# Patient Record
Sex: FEMALE | Race: WHITE | Employment: PART TIME | URBAN - METROPOLITAN AREA
[De-identification: names, ages, dates, MRNs, and addresses within clinical notes are randomized per-mention and may not be internally consistent; named-entity substitution may affect disease eponyms.]

---

## 2012-06-05 LAB — COLONOSCOPY, EXTERNAL: NORMAL

## 2017-01-06 ENCOUNTER — OFFICE VISIT (OUTPATIENT)
Dept: FAMILY MEDICINE CLINIC | Age: 72
End: 2017-01-06

## 2017-01-06 VITALS
OXYGEN SATURATION: 98 % | HEART RATE: 88 BPM | BODY MASS INDEX: 32.57 KG/M2 | HEIGHT: 62 IN | WEIGHT: 177 LBS | SYSTOLIC BLOOD PRESSURE: 122 MMHG | DIASTOLIC BLOOD PRESSURE: 79 MMHG | TEMPERATURE: 98.6 F | RESPIRATION RATE: 14 BRPM

## 2017-01-06 DIAGNOSIS — E11.65 TYPE 2 DIABETES MELLITUS WITH HYPERGLYCEMIA, WITHOUT LONG-TERM CURRENT USE OF INSULIN (HCC): Primary | ICD-10-CM

## 2017-01-06 DIAGNOSIS — I10 ESSENTIAL HYPERTENSION: ICD-10-CM

## 2017-01-06 DIAGNOSIS — E78.5 HYPERLIPIDEMIA LDL GOAL <100: ICD-10-CM

## 2017-01-06 NOTE — PROGRESS NOTES
Subjective:     Josefina Crockett is a 73 y.o. female seen for follow up of diabetes and multiple other chronic conditions. She has diabetes, hypertension and hyperlipidemia. Diabetic Review of Systems - medication compliance: compliant all of the time, diabetic diet compliance: compliant all of the time, home glucose monitoring: AM fasting values range on average: 100-110    further diabetic ROS: no polyuria or polydipsia, no chest pain, dyspnea or TIA's, no numbness, tingling or pain in extremities, no unusual visual symptoms, no hypoglycemia, no medication side effects noted, weight has decreased, last eye exam UTD.     Wt Readings from Last 3 Encounters:   01/06/17 177 lb (80.3 kg)   07/07/16 175 lb (79.4 kg)   04/07/16 173 lb 3.2 oz (78.6 kg)     Diabetic monitoring:     Lab Results   Component Value Date/Time    Hemoglobin A1c 6.4 07/08/2016 09:55 AM    Hemoglobin A1c 6.8 05/04/2015 11:24 AM    Hemoglobin A1c 7.0 04/30/2015 08:47 AM    Glucose 99 09/03/2015 10:40 AM    Microalb/Creat ratio (ug/mg creat.) 26.8 07/08/2016 09:55 AM    LDL, calculated 100 04/07/2016 08:51 AM    Creatinine 0.94 09/03/2015 10:40 AM       Last A1C: 6.4% on 7/2016, due soon for repeat  Serum Cr:  UTD  Urine microalbumin: UTD  Lipid panel: LDL was 100 on 4/7/16; HDL was 62; repeat due in 4/2017  Diabetic Eye exam: UTD, we will request copy of this today  Foot exam: UTD    Pneumonia vaccine: UTD  Annual flu vaccine for this season: got at school, high dose    On Ace-i or ARB: yes   Medication Sig    telmisartan (MICARDIS) 80 mg tablet TAKE ONE TABLET BY MOUTH DAILY    chlorthalidone (HYGROTEN) 25 mg tablet Take 1 tablet by mouth  daily     Telmistartan is not covered and patient has been on losartan but BP is not well controlled on this one, gained weight on this medication, would like to continue on telmistartan which we were able to get pre authorization for recently    On statin: yes  Current Outpatient Prescriptions   Medication Sig Dispense Refill    simvastatin (ZOCOR) 80 mg tablet 1 PO daily        On metformin:  yes  Medication Sig    metFORMIN (GLUCOPHAGE) 500 mg tablet Take 1 Tab by mouth two (2) times daily (with meals). Other diabetic medications: none  Following diabetic diet: yes  Attended diabetic education: yes  Attended monthly diabetic class here in clinic: yes    Patient Active Problem List   Diagnosis Code    HTN (hypertension) I10    Osteopenia M85.80    Genital herpes A60.00    Hyperlipemia E78.5    Diabetes mellitus ([facility] E11.9    Diabetes mellitus, controlled ([facility] E11.9     Patient Active Problem List    Diagnosis Date Noted    Diabetes mellitus, controlled ([facility] 09/03/2015    Diabetes mellitus ([facility] 05/07/2015    Hyperlipemia 11/26/2014    Osteopenia 03/10/2010    Genital herpes 03/10/2010    HTN (hypertension) 03/08/2010     Current Outpatient Prescriptions   Medication Sig Dispense Refill    atorvastatin (LIPITOR) 40 mg tablet Take 1 tablet by mouth  daily 90 Tab 3    telmisartan (MICARDIS) 80 mg tablet TAKE ONE TABLET BY MOUTH DAILY 90 Tab 3    chlorthalidone (HYGROTEN) 25 mg tablet Take 1 tablet by mouth  daily 90 Tab 3    metFORMIN (GLUCOPHAGE) 500 mg tablet Take 1 tablet by mouth two  times daily with meals 180 Tab 3    raloxifene (EVISTA) 60 mg tablet Take 1 tablet by mouth  daily 90 Tab 3    valACYclovir (VALTREX) 500 mg tablet Take 1 tablet by mouth  daily 90 Tab 3    losartan (COZAAR) 100 mg tablet       GLUCOSAMINE HCL-MSM PO Take  by mouth.  glucose blood VI test strips (ACCU-CHEK SMARTVIEW TEST STRIP) strip Per insurance, testing 2 x weekly Dx: Diabetes uncontrolled 250.02 1 Each 11    Lancets misc Per insurance, testing 2 x weekly Dx: Diabetes uncontrolled 250.02 1 Each 11    multivitamin (ONE A DAY) tablet Take 1 Tab by mouth daily.  OMEPRAZOLE MAGNESIUM (PRILOSEC OTC PO) Take  by mouth. Takes one po daily.        naproxen sodium (ALEVE) 220 mg tablet Take 220 mg by mouth daily as needed. prn       Allergies   Allergen Reactions    Pcn [Penicillins] Itching     Lips itching    Femara [Letrozole] Other (comments)     htn        Lab Results   Component Value Date/Time    Hemoglobin A1c 6.4 07/08/2016 09:55 AM    Hemoglobin A1c 6.8 05/04/2015 11:24 AM    Hemoglobin A1c 7.0 04/30/2015 08:47 AM    Glucose 99 09/03/2015 10:40 AM    Microalb/Creat ratio (ug/mg creat.) 26.8 07/08/2016 09:55 AM    LDL, calculated 100 04/07/2016 08:51 AM    Creatinine 0.94 09/03/2015 10:40 AM      Lab Results   Component Value Date/Time    Cholesterol, total 189 04/07/2016 08:51 AM    HDL Cholesterol 65 04/07/2016 08:51 AM    LDL, calculated 100 04/07/2016 08:51 AM    Triglyceride 122 04/07/2016 08:51 AM     Lab Results   Component Value Date/Time    ALT 6 04/30/2015 08:47 AM    AST 19 04/30/2015 08:47 AM    Alk. phosphatase 95 04/30/2015 08:47 AM    Bilirubin, direct 0.12 04/30/2015 08:47 AM    Bilirubin, total 0.4 04/30/2015 08:47 AM     Lab Results   Component Value Date/Time    GFR est AA 73 09/03/2015 10:40 AM    GFR est non-AA 63 09/03/2015 10:40 AM    Creatinine 0.94 09/03/2015 10:40 AM    BUN 20 09/03/2015 10:40 AM    Sodium 138 09/03/2015 10:40 AM    Potassium 4.5 09/03/2015 10:40 AM    Chloride 97 09/03/2015 10:40 AM    CO2 24 09/03/2015 10:40 AM         Review of Systems  Pertinent items are noted in HPI. Objective:     Visit Vitals    Ht 5' 2\" (1.575 m)    Wt 177 lb (80.3 kg)    BMI 32.37 kg/m2     Appearance: alert, well appearing, and in no distress. CV: S1, S2 present, no M/R/G  Pulm:non labored on RA    Lab review: labs reviewed, I note that lipids LDL result does not yet meet goal but not yet due for recheck    Assessment/Plan:       ICD-10-CM ICD-9-CM    1.  Type 2 diabetes mellitus with hyperglycemia, without long-term current use of insulin (HCC) E11.65 250.00 HEMOGLOBIN A1C WITH EAG    Condition stable, after review of medications, Continue current medication and dosing without change.       790.29    2. Essential hypertension I10 401.9 Condition stable, after review of medications, Continue current medication and dosing without change. 3. Hyperlipidemia LDL goal <100 E78.5 272.4 Condition stable, after review of medications, Continue current medication and dosing without change. FLP due at next visit in 4/2017         Follow-up Disposition:  Return in about 6 months (around 7/6/2017) for Annual wellness visit and DM, HTN. Dr. Greg Coto D.O.   Physician

## 2017-01-06 NOTE — MR AVS SNAPSHOT
Visit Information Date & Time Provider Department Dept. Phone Encounter #  
 1/6/2017  8:00 AM Edward Cash, [address] [facility] [phone] [id number] Follow-up Instructions Return in about 6 months (around 7/6/2017) for Annual wellness visit and DM, HTN. Follow-up and Disposition History Upcoming Health Maintenance Date Due  
 MEDICARE YEARLY EXAM 4/30/2016 EYE EXAM RETINAL OR DILATED Q1 10/19/2016 HEMOGLOBIN A1C Q6M 1/8/2017 LIPID PANEL Q1 4/7/2017 FOOT EXAM Q1 7/7/2017 MICROALBUMIN Q1 7/8/2017 GLAUCOMA SCREENING Q2Y 10/19/2017 BREAST CANCER SCRN MAMMOGRAM 1/8/2018 COLONOSCOPY 6/5/2022 DTaP/Tdap/Td series (2 - Td) 4/1/2024 Allergies as of 1/6/2017  Review Complete On: 1/6/2017 By: Edwadr Cash, DO Severity Noted Reaction Type Reactions Pcn [Penicillins] High 03/03/2010    Itching Lips itching Femara [Letrozole]  03/03/2010    Other (comments)  
 htn Current Immunizations  Reviewed on 9/18/2014 Name Date Influenza High Dose Vaccine PF 10/5/2016, 9/28/2015, 9/18/2014 Influenza Vaccine 8/25/2013 Influenza Vaccine Whole 9/1/2011 Pneumococcal Polysaccharide (PPSV-23) 9/18/2014 Pneumococcal Vaccine (Unspecified Type) 12/14/2011 Tdap 4/1/2014 Zoster 11/2/2012 Not reviewed this visit You Were Diagnosed With   
  
 Codes Comments Type 2 diabetes mellitus with hyperglycemia, without long-term current use of insulin (HCC)    -  Primary ICD-10-CM: E11.65 ICD-9-CM: 250.00, 790.29 Essential hypertension     ICD-10-CM: I10 
ICD-9-CM: 401.9 Hyperlipidemia LDL goal <100     ICD-10-CM: E78.5 ICD-9-CM: 272.4 Vitals BP Pulse Temp Resp Height(growth percentile) Weight(growth percentile) 122/79 (BP 1 Location: Left arm, BP Patient Position: Sitting) 88 98.6 °F (37 °C) (Oral) 14 5' 2\" (1.575 m) 177 lb (80.3 kg) SpO2 BMI OB Status Smoking Status 98% 32.37 kg/m2 Postmenopausal Never Smoker Vitals History BMI and BSA Data Body Mass Index Body Surface Area  
 32.37 kg/m 2 1.87 m 2 Preferred Pharmacy Pharmacy Name Phone [address] [facility], [address] Jeremias Coe Your Updated Medication List  
  
   
This list is accurate as of: 1/6/17  8:26 AM.  Always use your most recent med list.  
  
  
  
  
 ALEVE 220 mg tablet Generic drug:  naproxen sodium Take 220 mg by mouth daily as needed. prn  
  
 atorvastatin 40 mg tablet Commonly known as:  LIPITOR Take 1 tablet by mouth  daily  
  
 chlorthalidone 25 mg tablet Commonly known as:  Beckey Severance Take 1 tablet by mouth  daily GLUCOSAMINE HCL-MSM PO Take  by mouth. glucose blood VI test strips strip Commonly known as:  [address] Per insurance, testing 2 x weekly Dx: Diabetes uncontrolled 250.02 Lancets Misc Per insurance, testing 2 x weekly Dx: Diabetes uncontrolled 250.02  
  
 metFORMIN 500 mg tablet Commonly known as:  GLUCOPHAGE Take 1 tablet by mouth two  times daily with meals  
  
 multivitamin tablet Commonly known as:  ONE A DAY Take 1 Tab by mouth daily. PRILOSEC OTC PO Take  by mouth. Takes one po daily. raloxifene 60 mg tablet Commonly known as:  EVISTA Take 1 tablet by mouth  daily  
  
 telmisartan 80 mg tablet Commonly known as:  MICARDIS  
TAKE ONE TABLET BY MOUTH DAILY  
  
 valACYclovir 500 mg tablet Commonly known as:  VALTREX Take 1 tablet by mouth  daily We Performed the Following HEMOGLOBIN A1C WITH EAG [27670 CPT(R)] Follow-up Instructions Return in about 6 months (around 7/6/2017) for Annual wellness visit and DM, HTN. Patient Instructions Learning About Diabetes Food Guidelines Your Care Instructions Meal planning is important to manage diabetes.  It helps keep your blood sugar at a target level (which you set with your doctor). You don't have to eat special foods. You can eat what your family eats, including sweets once in a while. But you do have to pay attention to how often you eat and how much you eat of certain foods. You may want to work with a dietitian or a certified diabetes educator (CDE) to help you plan meals and snacks. A dietitian or CDE can also help you lose weight if that is one of your goals. What should you know about eating carbs? Managing the amount of carbohydrate (carbs) you eat is an important part of healthy meals when you have diabetes. Carbohydrate is found in many foods. · Learn which foods have carbs. And learn the amounts of carbs in different foods. ¨ Bread, cereal, pasta, and rice have about 15 grams of carbs in a serving. A serving is 1 slice of bread (1 ounce), ½ cup of cooked cereal, or 1/3 cup of cooked pasta or rice. ¨ Fruits have 15 grams of carbs in a serving. A serving is 1 small fresh fruit, such as an apple or orange; ½ of a banana; ½ cup of cooked or canned fruit; ½ cup of fruit juice; 1 cup of melon or raspberries; or 2 tablespoons of dried fruit. ¨ Milk and no-sugar-added yogurt have 15 grams of carbs in a serving. A serving is 1 cup of milk or 2/3 cup of no-sugar-added yogurt. ¨ Starchy vegetables have 15 grams of carbs in a serving. A serving is ½ cup of mashed potatoes or sweet potato; 1 cup winter squash; ½ of a small baked potato; ½ cup of cooked beans; or ½ cup cooked corn or green peas. · Learn how much carbs to eat each day and at each meal. A dietitian or CDE can teach you how to keep track of the amount of carbs you eat. This is called carbohydrate counting. · If you are not sure how to count carbohydrate grams, use the Plate Method to plan meals. It is a good, quick way to make sure that you have a balanced meal. It also helps you spread carbs throughout the day. ¨ Divide your plate by types of foods. Put non-starchy vegetables on half the plate, meat or other protein food on one-quarter of the plate, and a grain or starchy vegetable in the final quarter of the plate. To this you can add a small piece of fruit and 1 cup of milk or yogurt, depending on how many carbs you are supposed to eat at a meal. 
· Try to eat about the same amount of carbs at each meal. Do not \"save up\" your daily allowance of carbs to eat at one meal. 
· Proteins have very little or no carbs per serving. Examples of proteins are beef, chicken, turkey, fish, eggs, tofu, cheese, cottage cheese, and peanut butter. A serving size of meat is 3 ounces, which is about the size of a deck of cards. Examples of meat substitute serving sizes (equal to 1 ounce of meat) are 1/4 cup of cottage cheese, 1 egg, 1 tablespoon of peanut butter, and ½ cup of tofu. How can you eat out and still eat healthy? · Learn to estimate the serving sizes of foods that have carbohydrate. If you measure food at home, it will be easier to estimate the amount in a serving of restaurant food. · If the meal you order has too much carbohydrate (such as potatoes, corn, or baked beans), ask to have a low-carbohydrate food instead. Ask for a salad or green vegetables. · If you use insulin, check your blood sugar before and after eating out to help you plan how much to eat in the future. · If you eat more carbohydrate at a meal than you had planned, take a walk or do other exercise. This will help lower your blood sugar. What else should you know? · Limit saturated fat, such as the fat from meat and dairy products. This is a healthy choice because people who have diabetes are at higher risk of heart disease. So choose lean cuts of meat and nonfat or low-fat dairy products. Use olive or canola oil instead of butter or shortening when cooking. · Don't skip meals.  Your blood sugar may drop too low if you skip meals and take insulin or certain medicines for diabetes. · Check with your doctor before you drink alcohol. Alcohol can cause your blood sugar to drop too low. Alcohol can also cause a bad reaction if you take certain diabetes medicines. Follow-up care is a key part of your treatment and safety. Be sure to make and go to all appointments, and call your doctor if you are having problems. It's also a good idea to know your test results and keep a list of the medicines you take. Where can you learn more? Go to http://ivy-yoadny.info/. Enter Z215 in the search box to learn more about \"Learning About Diabetes Food Guidelines. \" Current as of: May 23, 2016 Content Version: 11.1 © 3016-3867 [employer]. Care instructions adapted under license by [employer] (which disclaims liability or warranty for this information). If you have questions about a medical condition or this instruction, always ask your healthcare professional. [place] [address] any warranty or liability for your use of this information. Introducing [facility]! Dear Emi Kelly: 
Thank you for requesting a [employer] account. Our records indicate that you already have an active [employer] account. You can access your account anytime at [email or web address] Did you know that you can access your hospital and ER discharge instructions at any time in [employer]? You can also review all of your test results from your hospital stay or ER visit. Additional Information If you have questions, please visit the Frequently Asked Questions section of the [employer] website at [email or web address] Remember, [employer] is NOT to be used for urgent needs. For medical emergencies, dial 911. Now available from your iPhone and Android! Please provide this summary of care documentation to your next provider. Your primary care clinician is listed as Crista Gomes. If you have any questions after today's visit, please call [phone].

## 2017-01-06 NOTE — PATIENT INSTRUCTIONS

## 2017-01-06 NOTE — PROGRESS NOTES
Chief Complaint   Patient presents with    Diabetes    Hypertension       1. Have you been to the ER, urgent care clinic since your last visit? Hospitalized since your last visit? No    2. Have you seen or consulted any other health care providers outside of the [address] [place] [address] since your last visit? Include any pap smears or colon screening. No    Body mass index is 32.37 kg/(m^2).

## 2017-01-07 LAB
EST. AVERAGE GLUCOSE BLD GHB EST-MCNC: 134 MG/DL
HBA1C MFR BLD: 6.3 % (ref 4.8–5.6)

## 2017-03-08 ENCOUNTER — OFFICE VISIT (OUTPATIENT)
Dept: FAMILY MEDICINE CLINIC | Age: 72
End: 2017-03-08

## 2017-03-08 VITALS
TEMPERATURE: 98.1 F | DIASTOLIC BLOOD PRESSURE: 62 MMHG | SYSTOLIC BLOOD PRESSURE: 120 MMHG | HEIGHT: 62 IN | RESPIRATION RATE: 18 BRPM | BODY MASS INDEX: 32.94 KG/M2 | WEIGHT: 179 LBS | HEART RATE: 78 BPM | OXYGEN SATURATION: 98 %

## 2017-03-08 DIAGNOSIS — R31.9 URINARY TRACT INFECTION WITH HEMATURIA, SITE UNSPECIFIED: ICD-10-CM

## 2017-03-08 DIAGNOSIS — N39.0 URINARY TRACT INFECTION WITH HEMATURIA, SITE UNSPECIFIED: ICD-10-CM

## 2017-03-08 DIAGNOSIS — R35.0 URINARY FREQUENCY: Primary | ICD-10-CM

## 2017-03-08 LAB
BILIRUB UR QL STRIP: NEGATIVE
GLUCOSE UR-MCNC: NEGATIVE MG/DL
KETONES P FAST UR STRIP-MCNC: NEGATIVE MG/DL
PH UR STRIP: 5.5 [PH] (ref 4.6–8)
PROT UR QL STRIP: NEGATIVE MG/DL
SP GR UR STRIP: 1.01 (ref 1–1.03)
UA UROBILINOGEN AMB POC: ABNORMAL (ref 0.2–1)
URINALYSIS CLARITY POC: ABNORMAL
URINALYSIS COLOR POC: ABNORMAL
URINE BLOOD POC: ABNORMAL
URINE LEUKOCYTES POC: ABNORMAL
URINE NITRITES POC: POSITIVE

## 2017-03-08 RX ORDER — NITROFURANTOIN 25; 75 MG/1; MG/1
100 CAPSULE ORAL 2 TIMES DAILY
Qty: 14 CAP | Refills: 0 | Status: SHIPPED | OUTPATIENT
Start: 2017-03-08 | End: 2017-04-21 | Stop reason: ALTCHOICE

## 2017-03-08 NOTE — PATIENT INSTRUCTIONS
Urinary Tract Infection in Women: Care Instructions  Your Care Instructions    A urinary tract infection, or UTI, is a general term for an infection anywhere between the kidneys and the urethra (where urine comes out). Most UTIs are bladder infections. They often cause pain or burning when you urinate. UTIs are caused by bacteria and can be cured with antibiotics. Be sure to complete your treatment so that the infection goes away. Follow-up care is a key part of your treatment and safety. Be sure to make and go to all appointments, and call your doctor if you are having problems. It's also a good idea to know your test results and keep a list of the medicines you take. How can you care for yourself at home? · Take your antibiotics as directed. Do not stop taking them just because you feel better. You need to take the full course of antibiotics. · Drink extra water and other fluids for the next day or two. This may help wash out the bacteria that are causing the infection. (If you have kidney, heart, or liver disease and have to limit fluids, talk with your doctor before you increase your fluid intake.)  · Avoid drinks that are carbonated or have caffeine. They can irritate the bladder. · Urinate often. Try to empty your bladder each time. · To relieve pain, take a hot bath or lay a heating pad set on low over your lower belly or genital area. Never go to sleep with a heating pad in place. To prevent UTIs  · Drink plenty of water each day. This helps you urinate often, which clears bacteria from your system. (If you have kidney, heart, or liver disease and have to limit fluids, talk with your doctor before you increase your fluid intake.)  · Consider adding cranberry juice to your diet. · Urinate when you need to. · Urinate right after you have sex. · Change sanitary pads often. · Avoid douches, bubble baths, feminine hygiene sprays, and other feminine hygiene products that have deodorants.   · After going to the bathroom, wipe from front to back. When should you call for help? Call your doctor now or seek immediate medical care if:  · Symptoms such as fever, chills, nausea, or vomiting get worse or appear for the first time. · You have new pain in your back just below your rib cage. This is called flank pain. · There is new blood or pus in your urine. · You have any problems with your antibiotic medicine. Watch closely for changes in your health, and be sure to contact your doctor if:  · You are not getting better after taking an antibiotic for 2 days. · Your symptoms go away but then come back. Where can you learn more? Go to http://ivy-yoandy.info/. Enter V560 in the search box to learn more about \"Urinary Tract Infection in Women: Care Instructions. \"  Current as of: August 12, 2016  Content Version: 11.1  © 0764-1999 [employer]. Care instructions adapted under license by [employer] (which disclaims liability or warranty for this information). If you have questions about a medical condition or this instruction, always ask your healthcare professional. [address] any warranty or liability for your use of this information.

## 2017-03-08 NOTE — MR AVS SNAPSHOT
Visit Information Date & Time Provider Department Dept. Phone Encounter #  
 3/8/2017 10:40 AM Eloise Vasquez  [facility] [phone] [id number] Your Appointments 7/7/2017  8:00 AM  
ROUTINE CARE with Sandhya House MD  
[facility]) Appt Note: follow up  
 [address] [facility] [address]  
[phone]  
  
   
 [address] [facility] 7 [address] Upcoming Health Maintenance Date Due  
 MEDICARE YEARLY EXAM 4/30/2016 LIPID PANEL Q1 4/7/2017 HEMOGLOBIN A1C Q6M 7/6/2017 FOOT EXAM Q1 7/7/2017 MICROALBUMIN Q1 7/8/2017 EYE EXAM RETINAL OR DILATED Q1 12/27/2017 BREAST CANCER SCRN MAMMOGRAM 1/8/2018 GLAUCOMA SCREENING Q2Y 12/27/2018 COLONOSCOPY 6/5/2022 DTaP/Tdap/Td series (2 - Td) 4/1/2024 Allergies as of 3/8/2017  Review Complete On: 3/8/2017 By: Jasmyne Bones, LPN Severity Noted Reaction Type Reactions Pcn [Penicillins] High 03/03/2010    Itching Lips itching Femara [Letrozole]  03/03/2010    Other (comments)  
 htn Current Immunizations  Reviewed on 9/18/2014 Name Date Influenza High Dose Vaccine PF 10/5/2016, 9/28/2015, 9/18/2014 Influenza Vaccine 8/25/2013 Influenza Vaccine Whole 9/1/2011 Pneumococcal Polysaccharide (PPSV-23) 9/18/2014 Pneumococcal Vaccine (Unspecified Type) 12/14/2011 Tdap 4/1/2014 Zoster 11/2/2012 Not reviewed this visit You Were Diagnosed With   
  
 Codes Comments Urinary frequency    -  Primary ICD-10-CM: R35.0 ICD-9-CM: 788.41 Urinary tract infection with hematuria, site unspecified     ICD-10-CM: N39.0, R31.9 ICD-9-CM: 599.0 Vitals BP Pulse Temp Resp Height(growth percentile) Weight(growth percentile) 120/62 (BP 1 Location: Left arm, BP Patient Position: Sitting) 78 98.1 °F (36.7 °C) (Oral) 18 5' 2\" (1.575 m) 179 lb (81.2 kg) SpO2 BMI OB Status Smoking Status 98% 32.74 kg/m2 Postmenopausal Never Smoker Vitals History BMI and BSA Data Body Mass Index Body Surface Area 32.74 kg/m 2 1.88 m 2 Preferred Pharmacy Pharmacy Name Phone [facility] [address], [address] [phone] Your Updated Medication List  
  
   
This list is accurate as of: 3/8/17 11:13 AM.  Always use your most recent med list.  
  
  
  
  
 ALEVE 220 mg tablet Generic drug:  naproxen sodium Take 220 mg by mouth daily as needed. prn  
  
 atorvastatin 40 mg tablet Commonly known as:  LIPITOR Take 1 tablet by mouth  daily  
  
 chlorthalidone 25 mg tablet Commonly known as:  Suzen Pay Take 1 tablet by mouth  daily GLUCOSAMINE HCL-MSM PO Take  by mouth. glucose blood VI test strips strip Commonly known as:  [address] Per insurance, testing 2 x weekly Dx: Diabetes uncontrolled 250.02 Lancets Misc Per insurance, testing 2 x weekly Dx: Diabetes uncontrolled 250.02  
  
 metFORMIN 500 mg tablet Commonly known as:  GLUCOPHAGE Take 1 tablet by mouth two  times daily with meals  
  
 multivitamin tablet Commonly known as:  ONE A DAY Take 1 Tab by mouth daily. nitrofurantoin (macrocrystal-monohydrate) 100 mg capsule Commonly known as:  MACROBID Take 1 Cap by mouth two (2) times a day. Indications: BACTERIAL URINARY TRACT INFECTION  
  
 PRILOSEC OTC PO Take  by mouth. Takes one po daily. raloxifene 60 mg tablet Commonly known as:  EVISTA Take 1 tablet by mouth  daily  
  
 telmisartan 80 mg tablet Commonly known as:  MICARDIS  
TAKE ONE TABLET BY MOUTH DAILY  
  
 valACYclovir 500 mg tablet Commonly known as:  VALTREX Take 1 tablet by mouth  daily Prescriptions Sent to Pharmacy Refills  
 nitrofurantoin, macrocrystal-monohydrate, (MACROBID) 100 mg capsule 0 Sig: Take 1 Cap by mouth two (2) times a day.  Indications: BACTERIAL URINARY TRACT INFECTION Class: Normal  
 Pharmacy: Nickolas Shane [address], [address] #: [phone] Route: Oral  
  
We Performed the Following AMB POC URINALYSIS DIP STICK MANUAL W/O MICRO [76571 CPT(R)] Patient Instructions Urinary Tract Infection in Women: Care Instructions Your Care Instructions A urinary tract infection, or UTI, is a general term for an infection anywhere between the kidneys and the urethra (where urine comes out). Most UTIs are bladder infections. They often cause pain or burning when you urinate. UTIs are caused by bacteria and can be cured with antibiotics. Be sure to complete your treatment so that the infection goes away. Follow-up care is a key part of your treatment and safety. Be sure to make and go to all appointments, and call your doctor if you are having problems. It's also a good idea to know your test results and keep a list of the medicines you take. How can you care for yourself at home? · Take your antibiotics as directed. Do not stop taking them just because you feel better. You need to take the full course of antibiotics. · Drink extra water and other fluids for the next day or two. This may help wash out the bacteria that are causing the infection. (If you have kidney, heart, or liver disease and have to limit fluids, talk with your doctor before you increase your fluid intake.) · Avoid drinks that are carbonated or have caffeine. They can irritate the bladder. · Urinate often. Try to empty your bladder each time. · To relieve pain, take a hot bath or lay a heating pad set on low over your lower belly or genital area. Never go to sleep with a heating pad in place. To prevent UTIs · Drink plenty of water each day. This helps you urinate often, which clears bacteria from your system.  (If you have kidney, heart, or liver disease and have to limit fluids, talk with your doctor before you increase your fluid intake.) · Consider adding cranberry juice to your diet. · Urinate when you need to. · Urinate right after you have sex. · Change sanitary pads often. · Avoid douches, bubble baths, feminine hygiene sprays, and other feminine hygiene products that have deodorants. · After going to the bathroom, wipe from front to back. When should you call for help? Call your doctor now or seek immediate medical care if: · Symptoms such as fever, chills, nausea, or vomiting get worse or appear for the first time. · You have new pain in your back just below your rib cage. This is called flank pain. · There is new blood or pus in your urine. · You have any problems with your antibiotic medicine. Watch closely for changes in your health, and be sure to contact your doctor if: 
· You are not getting better after taking an antibiotic for 2 days. · Your symptoms go away but then come back. Where can you learn more? Go to http://ivy-yoandy.info/. Enter O702 in the search box to learn more about \"Urinary Tract Infection in Women: Care Instructions. \" Current as of: August 12, 2016 Content Version: 11.1 © 0829-2541 Virtual Solutions. Care instructions adapted under license by [employer] (which disclaims liability or warranty for this information). If you have questions about a medical condition or this instruction, always ask your healthcare professional. [place] [address] any warranty or liability for your use of this information. Introducing [facility] & HEALTH SERVICES! Dear Jasbir Xiong: 
Thank you for requesting a CarCareKiosk account. Our records indicate that you already have an active CarCareKiosk account. You can access your account anytime at [email or web address] Did you know that you can access your hospital and ER discharge instructions at any time in CarCareKiosk?   You can also review all of your test results from your hospital stay or ER visit. Additional Information If you have questions, please visit the Frequently Asked Questions section of the DefenCall website at [email or web address] Remember, DefenCall is NOT to be used for urgent needs. For medical emergencies, dial 911. Now available from your iPhone and Android! Please provide this summary of care documentation to your next provider. Your primary care clinician is listed as Waldo Chicas. If you have any questions after today's visit, please call [phone].

## 2017-03-08 NOTE — PROGRESS NOTES
HISTORY OF PRESENT ILLNESS  Phil Morel is a 73 y.o. female. HPI  Patient presents to office today as an acute care visit for symptoms related to UTI that occurred within the past 24 hours. She performed a home dip stick test that revealed positive for hematuria and leucocytes. She has started pyridium with symptom relief. Review of Systems   HENT: Negative. Eyes: Negative. Respiratory: Negative. Cardiovascular: Negative. Gastrointestinal: Negative. Genitourinary: Positive for dysuria, frequency and urgency. Negative for flank pain. Musculoskeletal: Negative. Skin: Negative. Neurological: Negative. Endo/Heme/Allergies: Negative. Psychiatric/Behavioral: Negative. Visit Vitals    /62 (BP 1 Location: Left arm, BP Patient Position: Sitting)    Pulse 78    Temp 98.1 °F (36.7 °C) (Oral)    Resp 18    Ht 5' 2\" (1.575 m)    Wt 179 lb (81.2 kg)    SpO2 98%    BMI 32.74 kg/m2     Current Outpatient Prescriptions on File Prior to Visit   Medication Sig Dispense Refill    telmisartan (MICARDIS) 80 mg tablet TAKE ONE TABLET BY MOUTH DAILY 90 Tab 3    chlorthalidone (HYGROTEN) 25 mg tablet Take 1 tablet by mouth  daily 90 Tab 3    metFORMIN (GLUCOPHAGE) 500 mg tablet Take 1 tablet by mouth two  times daily with meals 180 Tab 3    raloxifene (EVISTA) 60 mg tablet Take 1 tablet by mouth  daily 90 Tab 3    valACYclovir (VALTREX) 500 mg tablet Take 1 tablet by mouth  daily 90 Tab 3    atorvastatin (LIPITOR) 40 mg tablet Take 1 tablet by mouth  daily 90 Tab 3    glucose blood VI test strips (ACCU-CHEK SMARTVIEW TEST STRIP) strip Per insurance, testing 2 x weekly Dx: Diabetes uncontrolled 250.02 1 Each 11    Lancets misc Per insurance, testing 2 x weekly Dx: Diabetes uncontrolled 250.02 1 Each 11    multivitamin (ONE A DAY) tablet Take 1 Tab by mouth daily.  OMEPRAZOLE MAGNESIUM (PRILOSEC OTC PO) Take  by mouth. Takes one po daily.        naproxen sodium (ALEVE) 220 mg tablet Take 220 mg by mouth daily as needed. prn      GLUCOSAMINE HCL-MSM PO Take  by mouth. No current facility-administered medications on file prior to visit. Recent Results (from the past 12 hour(s))   AMB POC URINALYSIS DIP STICK MANUAL W/O MICRO    Collection Time: 03/08/17 11:01 AM   Result Value Ref Range    Color (UA POC) Sanjuana     Clarity (UA POC) Cloudy     Glucose (UA POC) Negative Negative    Bilirubin (UA POC) Negative Negative    Ketones (UA POC) Negative Negative    Specific gravity (UA POC) 1.015 1.001 - 1.035    Blood (UA POC) 1+ Negative    pH (UA POC) 5.5 4.6 - 8.0    Protein (UA POC) Negative Negative mg/dL    Urobilinogen (UA POC) 0.2 mg/dL 0.2 - 1    Nitrites (UA POC) Positive Negative    Leukocyte esterase (UA POC) 1+ Negative       Physical Exam   Constitutional: She is oriented to person, place, and time. She appears well-developed and well-nourished. HENT:   Head: Normocephalic and atraumatic. Eyes: Pupils are equal, round, and reactive to light. Neck: Normal range of motion. Cardiovascular: Normal rate. Pulmonary/Chest: Effort normal and breath sounds normal.   Abdominal: Soft. Bowel sounds are normal.   Musculoskeletal: Normal range of motion. Neurological: She is alert and oriented to person, place, and time. Skin: Skin is warm. Psychiatric: She has a normal mood and affect. Nursing note and vitals reviewed. ASSESSMENT and PLAN    ICD-10-CM ICD-9-CM    1. Urinary frequency R35.0 788.41 AMB POC URINALYSIS DIP STICK MANUAL W/O MICRO   2. Urinary tract infection with hematuria, site unspecified N39.0 599.0 nitrofurantoin, macrocrystal-monohydrate, (MACROBID) 100 mg capsule    R31.9       Return to office if your symptoms does not improve.     Shmindi Gray, NP

## 2017-03-08 NOTE — PROGRESS NOTES
Chief Complaint   Patient presents with    Urinary Frequency     started this am aroud 3 am, urinary freq, using Azo       Reviewed Record in preparation for visit and have obtained necessary documentation. Identified pt with two pt identifiers (Name @ )    Health Maintenance Due   Topic    MEDICARE YEARLY EXAM     LIPID PANEL Q1          1. Have you been to the ER, urgent care clinic since your last visit? Hospitalized since your last visit? No    2. Have you seen or consulted any other health care providers outside of the [address] [place] [address] since your last visit? Include any pap smears or colon screening.  No

## 2017-03-30 RX ORDER — TELMISARTAN 80 MG/1
TABLET ORAL
Qty: 5 TAB | Refills: 0 | Status: SHIPPED | OUTPATIENT
Start: 2017-03-30 | End: 2017-04-03 | Stop reason: SDUPTHER

## 2017-03-30 NOTE — TELEPHONE ENCOUNTER
NOV 07/07/2017 with Dr. Kaufman Sensor    PA is being obtained for this patient for meds, has been steady on for years, but needs new PA with drug planb q6mos.      BP Readings from Last 3 Encounters:   03/08/17 120/62   01/06/17 122/79   07/07/16 122/75     Lab Results   Component Value Date/Time    GFR est AA 73 09/03/2015 10:40 AM    GFR est non-AA 63 09/03/2015 10:40 AM    Creatinine 0.94 09/03/2015 10:40 AM    BUN 20 09/03/2015 10:40 AM    Sodium 138 09/03/2015 10:40 AM    Potassium 4.5 09/03/2015 10:40 AM    Chloride 97 09/03/2015 10:40 AM    CO2 24 09/03/2015 10:40 AM

## 2017-03-30 NOTE — TELEPHONE ENCOUNTER
Patient would like a gap medication sent to the pharmacy on file while Bruce Holt is processing the prior authorization, patient states that she would like about 5 pills sent to the pharmacy on file.      Best call back # for patient:[phone]  Pharmacy on file verified

## 2017-04-06 ENCOUNTER — TELEPHONE (OUTPATIENT)
Dept: FAMILY MEDICINE CLINIC | Age: 72
End: 2017-04-06

## 2017-04-06 DIAGNOSIS — I10 HTN, GOAL BELOW 150/90: Primary | ICD-10-CM

## 2017-04-06 RX ORDER — OLMESARTAN MEDOXOMIL 20 MG/1
20 TABLET ORAL DAILY
Qty: 30 TAB | Refills: 2 | Status: SHIPPED | OUTPATIENT
Start: 2017-04-06 | End: 2017-04-21 | Stop reason: SDUPTHER

## 2017-04-06 NOTE — TELEPHONE ENCOUNTER
Other options that are within the same ARB category are olmesartan (Benicar) or valsartan (Diovan); does her insurance cover either one of these?

## 2017-04-06 NOTE — TELEPHONE ENCOUNTER
----- Message from Rogerio Salazar sent at 4/6/2017  9:35 AM EDT -----  Regarding: Mathur/telephone  Pt is requesting a different BP medication. She stated the insurance will not cover the medication she is taking. Pts number is [phone] and Leilani

## 2017-04-06 NOTE — TELEPHONE ENCOUNTER
PA has been sent for Micardis, apparently was not approved. Can you please suggest another HTN medication for this patient. She has a Hx of intolerance to Losartan c/o weight gain.      Routing to Dr. Wily Talamantes

## 2017-04-06 NOTE — TELEPHONE ENCOUNTER
Will stop telmisartan and start olmesartan 20 mg daily; would recommend pt to monitor her BPs at home. If persistently elevated above 150/90, pt to let us know. Would recommend an OV in the nest 4 to 6 weeks since I have not meet pt yet and we are making medication changes.

## 2017-04-10 NOTE — TELEPHONE ENCOUNTER
Message relayed to patient. Patient started olmesartan 20 mg over the past weekend.  Patient scheduled 5/25 for med eval and est care

## 2017-04-21 ENCOUNTER — OFFICE VISIT (OUTPATIENT)
Dept: FAMILY MEDICINE CLINIC | Age: 72
End: 2017-04-21

## 2017-04-21 ENCOUNTER — HOSPITAL ENCOUNTER (OUTPATIENT)
Dept: LAB | Age: 72
Discharge: HOME | End: 2017-04-21
Payer: MEDICARE

## 2017-04-21 VITALS
HEART RATE: 87 BPM | BODY MASS INDEX: 33.13 KG/M2 | WEIGHT: 180 LBS | DIASTOLIC BLOOD PRESSURE: 68 MMHG | HEIGHT: 62 IN | OXYGEN SATURATION: 98 % | RESPIRATION RATE: 18 BRPM | TEMPERATURE: 98.2 F | SYSTOLIC BLOOD PRESSURE: 110 MMHG

## 2017-04-21 DIAGNOSIS — I10 HTN, GOAL BELOW 150/90: ICD-10-CM

## 2017-04-21 DIAGNOSIS — R35.0 URINARY FREQUENCY: Primary | ICD-10-CM

## 2017-04-21 LAB
BILIRUB UR QL STRIP: NEGATIVE
GLUCOSE UR-MCNC: NEGATIVE MG/DL
KETONES P FAST UR STRIP-MCNC: NEGATIVE MG/DL
PH UR STRIP: 6 [PH] (ref 4.6–8)
PROT UR QL STRIP: NEGATIVE MG/DL
SP GR UR STRIP: 1.01 (ref 1–1.03)
UA UROBILINOGEN AMB POC: NORMAL (ref 0.2–1)
URINALYSIS CLARITY POC: NORMAL
URINALYSIS COLOR POC: NORMAL
URINE BLOOD POC: NORMAL
URINE LEUKOCYTES POC: NORMAL
URINE NITRITES POC: POSITIVE

## 2017-04-21 PROCEDURE — 87086 URINE CULTURE/COLONY COUNT: CPT

## 2017-04-21 PROCEDURE — 87077 CULTURE AEROBIC IDENTIFY: CPT

## 2017-04-21 PROCEDURE — 87088 URINE BACTERIA CULTURE: CPT

## 2017-04-21 PROCEDURE — 87186 SC STD MICRODIL/AGAR DIL: CPT

## 2017-04-21 RX ORDER — CIPROFLOXACIN 250 MG/1
250 TABLET, FILM COATED ORAL 2 TIMES DAILY
Qty: 6 TAB | Refills: 0 | Status: SHIPPED | OUTPATIENT
Start: 2017-04-21 | End: 2017-04-25 | Stop reason: SDUPTHER

## 2017-04-21 RX ORDER — OLMESARTAN MEDOXOMIL 20 MG/1
20 TABLET ORAL DAILY
Qty: 90 TAB | Refills: 0 | Status: SHIPPED | OUTPATIENT
Start: 2017-04-21 | End: 2017-06-08 | Stop reason: SDUPTHER

## 2017-04-21 NOTE — MR AVS SNAPSHOT
Visit Information Date & Time Provider Department Dept. Phone Encounter #  
 4/21/2017 11:15 AM [facility]uliya Diehl  [facility] [phone] [id number] Follow-up Instructions Return if symptoms worsen or fail to improve. Your Appointments 5/25/2017 10:45 AM  
ROUTINE CARE with Yuliya Diehl MD  
[facility]) Appt Note: med eval, est care-magdiel transfer  
 [address] [facility] [address]  
[phone]  
  
   
 [address] [id number]  
  
    
 7/7/2017  8:00 AM  
ROUTINE CARE with Yuliya Diehl MD  
[address] [facility] ([address]) Appt Note: follow up  
 [address]  
[phone] Upcoming Health Maintenance Date Due  
 MEDICARE YEARLY EXAM 4/30/2016 LIPID PANEL Q1 4/7/2017 HEMOGLOBIN A1C Q6M 7/6/2017 FOOT EXAM Q1 7/7/2017 MICROALBUMIN Q1 7/8/2017 EYE EXAM RETINAL OR DILATED Q1 12/27/2017 BREAST CANCER SCRN MAMMOGRAM 1/8/2018 GLAUCOMA SCREENING Q2Y 12/27/2018 COLONOSCOPY 6/5/2022 DTaP/Tdap/Td series (2 - Td) 4/1/2024 Allergies as of 4/21/2017  Review Complete On: 4/21/2017 By: Sari Love LPN Severity Noted Reaction Type Reactions Pcn [Penicillins] High 03/03/2010    Itching Lips itching Femara [Letrozole]  03/03/2010    Other (comments)  
 htn Current Immunizations  Reviewed on 9/18/2014 Name Date Influenza High Dose Vaccine PF 10/5/2016, 9/28/2015, 9/18/2014 Influenza Vaccine 8/25/2013 Influenza Vaccine Whole 9/1/2011 Pneumococcal Polysaccharide (PPSV-23) 9/18/2014 Pneumococcal Vaccine (Unspecified Type) 12/14/2011 Tdap 4/1/2014 Zoster 11/2/2012 Not reviewed this visit You Were Diagnosed With   
  
 Codes Comments Urinary frequency    -  Primary ICD-10-CM: R35.0 ICD-9-CM: 788.41   
 HTN, goal below 150/90     ICD-10-CM: I10 
 ICD-9-CM: 401.9 Vitals BP Pulse Temp Resp Height(growth percentile) Weight(growth percentile) 110/68 (BP 1 Location: Left arm, BP Patient Position: Sitting) 87 98.2 °F (36.8 °C) (Oral) 18 5' 2\" (1.575 m) 180 lb (81.6 kg) SpO2 BMI OB Status Smoking Status 98% 32.92 kg/m2 Postmenopausal Never Smoker Vitals History BMI and BSA Data Body Mass Index Body Surface Area  
 32.92 kg/m 2 1.89 m 2 Preferred Pharmacy Pharmacy Name Phone [facility] [address], [address] [phone] Your Updated Medication List  
  
   
This list is accurate as of: 4/21/17 11:48 AM.  Always use your most recent med list.  
  
  
  
  
 ALEVE 220 mg tablet Generic drug:  naproxen sodium Take 220 mg by mouth daily as needed. prn  
  
 atorvastatin 40 mg tablet Commonly known as:  LIPITOR Take 1 tablet by mouth  daily  
  
 chlorthalidone 25 mg tablet Commonly known as:  Sheeba Arbour Take 1 tablet by mouth  daily  
  
 ciprofloxacin HCl 250 mg tablet Commonly known as:  CIPRO Take 1 Tab by mouth two (2) times a day for 3 days. GLUCOSAMINE HCL-MSM PO Take  by mouth. glucose blood VI test strips strip Commonly known as:  [address] Per insurance, testing 2 x weekly Dx: Diabetes uncontrolled 250.02 Lancets Misc Per insurance, testing 2 x weekly Dx: Diabetes uncontrolled 250.02  
  
 metFORMIN 500 mg tablet Commonly known as:  GLUCOPHAGE Take 1 tablet by mouth two  times daily with meals  
  
 multivitamin tablet Commonly known as:  ONE A DAY Take 1 Tab by mouth daily. olmesartan 20 mg tablet Commonly known as:  [employer] Take 1 Tab by mouth daily. PRILOSEC OTC PO Take  by mouth. Takes one po daily. raloxifene 60 mg tablet Commonly known as:  EVISTA Take 1 tablet by mouth  daily  
  
 valACYclovir 500 mg tablet Commonly known as:  VALTREX Take 1 tablet by mouth  daily Prescriptions Sent to Pharmacy Refills  
 olmesartan (BENICAR) 20 mg tablet 0 Sig: Take 1 Tab by mouth daily. Class: Normal  
 Pharmacy: [address] Ph #: [phone] Route: Oral  
 ciprofloxacin HCl (CIPRO) 250 mg tablet 0 Sig: Take 1 Tab by mouth two (2) times a day for 3 days. Class: Normal  
 Pharmacy: Dominique Gauze [address], [address] Ph #: [phone] Route: Oral  
  
We Performed the Following AMB POC URINALYSIS DIP STICK AUTO W/O MICRO [33736 CPT(R)] CULTURE, URINE [id number] CPT(R)] Follow-up Instructions Return if symptoms worsen or fail to improve. Patient Instructions Urinary Tract Infection in Women: Care Instructions Your Care Instructions A urinary tract infection, or UTI, is a general term for an infection anywhere between the kidneys and the urethra (where urine comes out). Most UTIs are bladder infections. They often cause pain or burning when you urinate. UTIs are caused by bacteria and can be cured with antibiotics. Be sure to complete your treatment so that the infection goes away. Follow-up care is a key part of your treatment and safety. Be sure to make and go to all appointments, and call your doctor if you are having problems. It's also a good idea to know your test results and keep a list of the medicines you take. How can you care for yourself at home? · Take your antibiotics as directed. Do not stop taking them just because you feel better. You need to take the full course of antibiotics. · Drink extra water and other fluids for the next day or two. This may help wash out the bacteria that are causing the infection. (If you have kidney, heart, or liver disease and have to limit fluids, talk with your doctor before you increase your fluid intake.) · Avoid drinks that are carbonated or have caffeine. They can irritate the bladder. · Urinate often. Try to empty your bladder each time. · To relieve pain, take a hot bath or lay a heating pad set on low over your lower belly or genital area. Never go to sleep with a heating pad in place. To prevent UTIs · Drink plenty of water each day. This helps you urinate often, which clears bacteria from your system. (If you have kidney, heart, or liver disease and have to limit fluids, talk with your doctor before you increase your fluid intake.) · Urinate when you need to. · Urinate right after you have sex. · Change sanitary pads often. · Avoid douches, bubble baths, feminine hygiene sprays, and other feminine hygiene products that have deodorants. · After going to the bathroom, wipe from front to back. When should you call for help? Call your doctor now or seek immediate medical care if: · Symptoms such as fever, chills, nausea, or vomiting get worse or appear for the first time. · You have new pain in your back just below your rib cage. This is called flank pain. · There is new blood or pus in your urine. · You have any problems with your antibiotic medicine. Watch closely for changes in your health, and be sure to contact your doctor if: 
· You are not getting better after taking an antibiotic for 2 days. · Your symptoms go away but then come back. Where can you learn more? Go to http://ivy-yoandy.info/. Enter J653 in the search box to learn more about \"Urinary Tract Infection in Women: Care Instructions. \" Current as of: November 28, 2016 Content Version: 11.2 © 3508-3915 Lono. Care instructions adapted under license by The Exchange (which disclaims liability or warranty for this information). If you have questions about a medical condition or this instruction, always ask your healthcare professional. [address] any warranty or liability for your use of this information. Introducing [facility] & HEALTH SERVICES! Dear Luly Medeiros: 
Thank you for requesting a Fabrus account. Our records indicate that you already have an active Fabrus account. You can access your account anytime at [email or web address] Did you know that you can access your hospital and ER discharge instructions at any time in Fabrus? You can also review all of your test results from your hospital stay or ER visit. Additional Information If you have questions, please visit the Frequently Asked Questions section of the Fabrus website at [email or web address] Remember, Fabrus is NOT to be used for urgent needs. For medical emergencies, dial 911. Now available from your iPhone and Android! Please provide this summary of care documentation to your next provider. Your primary care clinician is listed as Kristel Jimenez. If you have any questions after today's visit, please call [phone].

## 2017-04-21 NOTE — PROGRESS NOTES
Patient presents in office today with c/o urinary frequency x 3 days and taken pyridium    Results for orders placed or performed in visit on 04/21/17   AMB POC URINALYSIS DIP STICK AUTO W/O MICRO     Status: None   Result Value Ref Range Status    Color (UA POC) Orange  Final    Clarity (UA POC) Cloudy  Final    Glucose (UA POC) Negative Negative Final    Bilirubin (UA POC) Negative Negative Final    Ketones (UA POC) Negative Negative Final    Specific gravity (UA POC) 1.010 1.001 - 1.035 Final    Blood (UA POC) 2+ Negative Final    pH (UA POC) 6.0 4.6 - 8.0 Final    Protein (UA POC) Negative Negative mg/dL Final    Urobilinogen (UA POC) 0.2 mg/dL 0.2 - 1 Final    Nitrites (UA POC) Positive Negative Final    Leukocyte esterase (UA POC) 3+ Negative Final   Results for orders placed or performed in visit on 03/08/17   AMB POC URINALYSIS DIP STICK MANUAL W/O MICRO     Status: Abnormal   Result Value Ref Range Status    Color (UA POC) Sanjuana  Final    Clarity (UA POC) Cloudy  Final    Glucose (UA POC) Negative Negative Final    Bilirubin (UA POC) Negative Negative Final    Ketones (UA POC) Negative Negative Final    Specific gravity (UA POC) 1.015 1.001 - 1.035 Final    Blood (UA POC) 1+ Negative Final    pH (UA POC) 5.5 4.6 - 8.0 Final    Protein (UA POC) Negative Negative mg/dL Final    Urobilinogen (UA POC) 0.2 mg/dL 0.2 - 1 Final    Nitrites (UA POC) Positive Negative Final    Leukocyte esterase (UA POC) 1+ Negative Final     Chief Complaint   Patient presents with    Urinary Frequency    [facility] Care     magdiel transfer     1. Have you been to the ER, urgent care clinic since your last visit? Hospitalized since your last visit? No    2. Have you seen or consulted any other health care providers outside of the [address] [place] [address] since your last visit? Include any pap smears or colon screening.  No     PHQ 2 / 9, over the last two weeks 4/21/2017   Little interest or pleasure in doing things Not at all Feeling down, depressed or hopeless Not at all   Total Score PHQ 2 0     Fall Risk Assessment, last 12 mths 4/21/2017   Able to walk? Yes   Fall in past 12 months?  No       Vitals:    04/21/17 1123   BP: 110/68   BP 1 Location: Left arm   BP Patient Position: Sitting   Pulse: 87   Resp: 18   Temp: 98.2 °F (36.8 °C)   TempSrc: Oral   SpO2: 98%   Weight: 180 lb (81.6 kg)   Height: 5' 2\" (1.575 m)

## 2017-04-21 NOTE — PROGRESS NOTES
Patient Name: Sonna Kocher   MRN: [id number]    Glendia Leventhal is a 73 y.o. female who presents with the following:     Dysuria  Patient complains of dysuria, frequency, urgency. Onset was 3 days ago, unchanged since that time. Patient denies back pain, cough, fever and stomach ache. Patient does have a history of recurrent UTI; last episodes in early March s/p Macrobid which did provide relief. Patient does have a history of kidney stones s/p lithotripsy. Started Azo today. Keeping well hydrated. Recently switched from telmisartan to olmesartan per formulary preference. Denies CP, SOB, or leg edema. Review of Systems   Constitutional: Negative for fever, malaise/fatigue and weight loss. Respiratory: Negative for cough, hemoptysis, shortness of breath and wheezing. Cardiovascular: Negative for chest pain, palpitations, leg swelling and PND. Gastrointestinal: Negative for abdominal pain, constipation, diarrhea, nausea and vomiting. Genitourinary: Positive for dysuria, frequency and urgency. Negative for flank pain and hematuria. The patient's medications, allergies, past medical history, surgical history, family history and social history were reviewed and updated where appropriate. Prior to Admission medications    Medication Sig Start Date End Date Taking? Authorizing Provider   olmesartan (BENICAR) 20 mg tablet Take 1 Tab by mouth daily.  4/21/17  Yes Yuliya Diehl MD   atorvastatin (LIPITOR) 40 mg tablet Take 1 tablet by mouth  daily 4/13/17  Yes Yuliya Diehl MD   chlorthalidone (HYGROTEN) 25 mg tablet Take 1 tablet by mouth  daily 9/27/16  Yes Lacey Sprague DO   metFORMIN (GLUCOPHAGE) 500 mg tablet Take 1 tablet by mouth two  times daily with meals 9/27/16  Yes Lacey Sprague DO   raloxifene (EVISTA) 60 mg tablet Take 1 tablet by mouth  daily 9/27/16  Yes Lacey Sprague DO   valACYclovir (VALTREX) 500 mg tablet Take 1 tablet by mouth  daily 9/27/16  Yes Pili Linares, DO   GLUCOSAMINE HCL-MSM PO Take  by mouth. Yes Historical Provider   glucose blood VI test strips (ACCU-CHEK SMARTVIEW TEST STRIP) strip Per insurance, testing 2 x weekly Dx: Diabetes uncontrolled 250.02 9/3/15  Yes Pili Milagros, DO   Lancets misc Per insurance, testing 2 x weekly Dx: Diabetes uncontrolled 250.02 9/3/15  Yes Pili Milagros, DO   multivitamin (ONE A DAY) tablet Take 1 Tab by mouth daily. Yes Historical Provider   OMEPRAZOLE MAGNESIUM (PRILOSEC OTC PO) Take  by mouth. Takes one po daily. Yes Historical Provider   naproxen sodium (ALEVE) 220 mg tablet Take 220 mg by mouth daily as needed. prn 8/24/10  Yes Historical Provider       Allergies   Allergen Reactions    Pcn [Penicillins] Itching     Lips itching    Femara [Letrozole] Other (comments)     htn           OBJECTIVE    Visit Vitals    /68 (BP 1 Location: Left arm, BP Patient Position: Sitting)    Pulse 87    Temp 98.2 °F (36.8 °C) (Oral)    Resp 18    Ht 5' 2\" (1.575 m)    Wt 180 lb (81.6 kg)    SpO2 98%    BMI 32.92 kg/m2       Physical Exam   Constitutional: She is well-developed, well-nourished, and in no distress. No distress. HENT:   Head: Normocephalic and atraumatic. Right Ear: External ear normal.   Left Ear: External ear normal.   Eyes: Conjunctivae and EOM are normal. Pupils are equal, round, and reactive to light. Cardiovascular: Normal rate, regular rhythm and normal heart sounds. Exam reveals no gallop and no friction rub. No murmur heard. Pulmonary/Chest: Effort normal and breath sounds normal. No respiratory distress. She has no wheezes. Musculoskeletal:   Negative CVA tenderness b/l   Skin: She is not diaphoretic. Psychiatric: Mood, memory, affect and judgment normal.   Nursing note and vitals reviewed. ASSESSMENT AND PLAN  Becky Lai is a 73 y.o. female who presents today for:    1. Urinary frequency  UA suggestive of UTI. Will start cipro and f/u with culture. Will reevaluate if pt would benefit from extended course of abx based on symptoms when cultures return. - AMB POC URINALYSIS DIP STICK AUTO W/O MICRO  - CULTURE, URINE  - ciprofloxacin HCl (CIPRO) 250 mg tablet; Take 1 Tab by mouth two (2) times a day for 3 days. Dispense: 6 Tab; Refill: 0    2. HTN, goal below 150/90  Stable, continue current treatment. - olmesartan (BENICAR) 20 mg tablet; Take 1 Tab by mouth daily. Dispense: 90 Tab; Refill: 0       Medications Discontinued During This Encounter   Medication Reason    nitrofurantoin, macrocrystal-monohydrate, (MACROBID) 100 mg capsule Therapy Completed    olmesartan (BENICAR) 20 mg tablet Reorder       Follow-up Disposition:  Return if symptoms worsen or fail to improve. Medication risks/benefits/costs/interactions/alternatives discussed with patient. Advised patient to call back or return to office if symptoms worsen/change/persist. If patient cannot reach us or should anything more severe/urgent arise he/she should proceed directly to the nearest emergency department. Discussed expected course/resolution/complications of diagnosis in detail with patient. Patient given a written after visit summary which includes his/her diagnoses, current medications and vitals. Patient expressed understanding with the diagnosis and plan.      Stephen Celaya M.D.

## 2017-04-21 NOTE — PATIENT INSTRUCTIONS
Urinary Tract Infection in Women: Care Instructions  Your Care Instructions    A urinary tract infection, or UTI, is a general term for an infection anywhere between the kidneys and the urethra (where urine comes out). Most UTIs are bladder infections. They often cause pain or burning when you urinate. UTIs are caused by bacteria and can be cured with antibiotics. Be sure to complete your treatment so that the infection goes away. Follow-up care is a key part of your treatment and safety. Be sure to make and go to all appointments, and call your doctor if you are having problems. It's also a good idea to know your test results and keep a list of the medicines you take. How can you care for yourself at home? · Take your antibiotics as directed. Do not stop taking them just because you feel better. You need to take the full course of antibiotics. · Drink extra water and other fluids for the next day or two. This may help wash out the bacteria that are causing the infection. (If you have kidney, heart, or liver disease and have to limit fluids, talk with your doctor before you increase your fluid intake.)  · Avoid drinks that are carbonated or have caffeine. They can irritate the bladder. · Urinate often. Try to empty your bladder each time. · To relieve pain, take a hot bath or lay a heating pad set on low over your lower belly or genital area. Never go to sleep with a heating pad in place. To prevent UTIs  · Drink plenty of water each day. This helps you urinate often, which clears bacteria from your system. (If you have kidney, heart, or liver disease and have to limit fluids, talk with your doctor before you increase your fluid intake.)  · Urinate when you need to. · Urinate right after you have sex. · Change sanitary pads often. · Avoid douches, bubble baths, feminine hygiene sprays, and other feminine hygiene products that have deodorants.   · After going to the bathroom, wipe from front to back.  When should you call for help? Call your doctor now or seek immediate medical care if:  · Symptoms such as fever, chills, nausea, or vomiting get worse or appear for the first time. · You have new pain in your back just below your rib cage. This is called flank pain. · There is new blood or pus in your urine. · You have any problems with your antibiotic medicine. Watch closely for changes in your health, and be sure to contact your doctor if:  · You are not getting better after taking an antibiotic for 2 days. · Your symptoms go away but then come back. Where can you learn more? Go to http://ivy-yoandy.info/. Enter X612 in the search box to learn more about \"Urinary Tract Infection in Women: Care Instructions. \"  Current as of: November 28, 2016  Content Version: 11.2  © 9378-7800 [employer], [employer]. Care instructions adapted under license by [employer] (which disclaims liability or warranty for this information). If you have questions about a medical condition or this instruction, always ask your healthcare professional. [address] any warranty or liability for your use of this information.

## 2017-04-24 LAB — BACTERIA UR CULT: ABNORMAL

## 2017-04-25 RX ORDER — CIPROFLOXACIN 250 MG/1
250 TABLET, FILM COATED ORAL 2 TIMES DAILY
Qty: 8 TAB | Refills: 0 | Status: SHIPPED | OUTPATIENT
Start: 2017-04-25 | End: 2017-04-29

## 2017-04-25 NOTE — PROGRESS NOTES
Will send additional 4 days worth for total of 7 days of abx; if no improvement after full abx course, pt to RTC.

## 2017-04-25 NOTE — PROGRESS NOTES
Called pt, is still having some Sx, would appreciate an extended ABX.  Routing to PCP for approval/denial/alteration/dose adjustment

## 2017-04-25 NOTE — PROGRESS NOTES
Please notify patient regarding their test results:    Confirmed UTI per culture, pan sensitive. Would like update on pt's current symptoms. Rx already given for cipro during OV but if still symptomatic, she may need extended duration of abx. If symptoms resolved, she likely has received adequate treatment.

## 2017-05-01 ENCOUNTER — TELEPHONE (OUTPATIENT)
Dept: FAMILY MEDICINE CLINIC | Age: 72
End: 2017-05-01

## 2017-05-01 NOTE — TELEPHONE ENCOUNTER
Called pt, adv her per Dr. Laura Roque, to RTC, pt has appt tomorrow with PCP  @ 5. Patient/ Caller given an opportunity to ask questions, repeated information, and verbalized understanding.

## 2017-05-01 NOTE — TELEPHONE ENCOUNTER
Would like update on pt's current symptoms. Unclear why she has not improved on 7 days of appropriate abx per urine culture. She may benefit from another week's worth of ciprofloxacin to total 14 days of treatment but would prefer if she was seen in person before extending abx therefore recommend same day appt. Given her hx of kidney stones, could consider re-referral to urology (unsure who her urologist was in the past) after her visit.

## 2017-05-01 NOTE — TELEPHONE ENCOUNTER
Willy Taylor  [phone]    Linzy Goodell states that she has been treated twice with short rounds of antibiotics for a UTI but it is not going away. She is asking for a return call with advice on what she should do.

## 2017-05-02 ENCOUNTER — OFFICE VISIT (OUTPATIENT)
Dept: FAMILY MEDICINE CLINIC | Age: 72
End: 2017-05-02

## 2017-05-02 ENCOUNTER — HOSPITAL ENCOUNTER (OUTPATIENT)
Dept: LAB | Age: 72
Discharge: HOME | End: 2017-05-02
Payer: MEDICARE

## 2017-05-02 VITALS
HEIGHT: 62 IN | WEIGHT: 179.5 LBS | BODY MASS INDEX: 33.03 KG/M2 | HEART RATE: 88 BPM | SYSTOLIC BLOOD PRESSURE: 102 MMHG | TEMPERATURE: 98 F | DIASTOLIC BLOOD PRESSURE: 60 MMHG | RESPIRATION RATE: 18 BRPM | OXYGEN SATURATION: 98 %

## 2017-05-02 DIAGNOSIS — N39.0 URINARY TRACT INFECTION WITHOUT HEMATURIA, SITE UNSPECIFIED: Primary | ICD-10-CM

## 2017-05-02 PROCEDURE — 36415 COLL VENOUS BLD VENIPUNCTURE: CPT

## 2017-05-02 PROCEDURE — 87086 URINE CULTURE/COLONY COUNT: CPT

## 2017-05-02 RX ORDER — CIPROFLOXACIN 500 MG/1
500 TABLET ORAL 2 TIMES DAILY
Qty: 14 TAB | Refills: 0 | Status: SHIPPED | OUTPATIENT
Start: 2017-05-02 | End: 2017-05-09

## 2017-05-02 NOTE — MR AVS SNAPSHOT
Visit Information Date & Time Provider Department Dept. Phone Encounter #  
 5/2/2017  1:45 PM Ruth Finn  [address] [phone] [id number] Follow-up Instructions Return if symptoms worsen or fail to improve. Your Appointments 5/25/2017 10:45 AM  
ROUTINE CARE with Ruth Finn MD  
[facility]) Appt Note: med eval, est care-magdiel transfer  
 [address] [place] [address] [id number]  
[phone]  
  
   
 [phone] [phone]  
  
    
 7/7/2017  8:00 AM  
ROUTINE CARE with Ruth Finn MD  
[address] ([address]) Appt Note: follow up  
 [address]  
[phone] Upcoming Health Maintenance Date Due  
 MEDICARE YEARLY EXAM 4/30/2016 LIPID PANEL Q1 4/7/2017 HEMOGLOBIN A1C Q6M 7/6/2017 FOOT EXAM Q1 7/7/2017 MICROALBUMIN Q1 7/8/2017 INFLUENZA AGE 9 TO ADULT 8/1/2017 EYE EXAM RETINAL OR DILATED Q1 12/27/2017 BREAST CANCER SCRN MAMMOGRAM 1/8/2018 GLAUCOMA SCREENING Q2Y 12/27/2018 COLONOSCOPY 6/5/2022 DTaP/Tdap/Td series (2 - Td) 4/1/2024 Allergies as of 5/2/2017  Review Complete On: 5/2/2017 By: Trey Dejesus LPN Severity Noted Reaction Type Reactions Pcn [Penicillins] High 03/03/2010    Itching Lips itching Femara [Letrozole]  03/03/2010    Other (comments)  
 htn Current Immunizations  Reviewed on 9/18/2014 Name Date Influenza High Dose Vaccine PF 10/5/2016, 9/28/2015, 9/18/2014 Influenza Vaccine 8/25/2013 Influenza Vaccine Whole 9/1/2011 Pneumococcal Polysaccharide (PPSV-23) 9/18/2014 Pneumococcal Vaccine (Unspecified Type) 12/14/2011 Tdap 4/1/2014 Zoster 11/2/2012 Not reviewed this visit You Were Diagnosed With   
  
 Codes Comments  Urinary tract infection without hematuria, site unspecified    -  Primary ICD-10-CM: N39.0 ICD-9-CM: 599.0 Vitals BP Pulse Temp Resp Height(growth percentile) Weight(growth percentile) 102/60 (BP 1 Location: Left arm, BP Patient Position: Sitting) 88 98 °F (36.7 °C) (Oral) 18 5' 2\" (1.575 m) 179 lb 8 oz (81.4 kg) SpO2 BMI OB Status Smoking Status 98% 32.83 kg/m2 Postmenopausal Never Smoker Vitals History BMI and BSA Data Body Mass Index Body Surface Area  
 32.83 kg/m 2 1.89 m 2 Preferred Pharmacy Pharmacy Name Phone [facility], [address] [phone] Your Updated Medication List  
  
   
This list is accurate as of: 5/2/17  2:12 PM.  Always use your most recent med list.  
  
  
  
  
 ALEVE 220 mg tablet Generic drug:  naproxen sodium Take 220 mg by mouth daily as needed. prn  
  
 atorvastatin 40 mg tablet Commonly known as:  LIPITOR Take 1 tablet by mouth  daily  
  
 chlorthalidone 25 mg tablet Commonly known as:  Deanna Gowers Take 1 tablet by mouth  daily  
  
 ciprofloxacin HCl 500 mg tablet Commonly known as:  CIPRO Take 1 Tab by mouth two (2) times a day for 7 days. GLUCOSAMINE HCL-MSM PO Take  by mouth. glucose blood VI test strips strip Commonly known as:  [address] Per insurance, testing 2 x weekly Dx: Diabetes uncontrolled 250.02 Lancets Misc Per insurance, testing 2 x weekly Dx: Diabetes uncontrolled 250.02  
  
 metFORMIN 500 mg tablet Commonly known as:  GLUCOPHAGE Take 1 tablet by mouth two  times daily with meals  
  
 multivitamin tablet Commonly known as:  ONE A DAY Take 1 Tab by mouth daily. olmesartan 20 mg tablet Commonly known as:  [employer] Take 1 Tab by mouth daily. PRILOSEC OTC PO Take  by mouth. Takes one po daily. raloxifene 60 mg tablet Commonly known as:  EVISTA Take 1 tablet by mouth  daily  
  
 valACYclovir 500 mg tablet Commonly known as:  VALTREX Take 1 tablet by mouth  daily Prescriptions Sent to Pharmacy Refills  
 ciprofloxacin HCl (CIPRO) 500 mg tablet 0 Sig: Take 1 Tab by mouth two (2) times a day for 7 days. Class: Normal  
 Pharmacy: [facility] [address] [address], [address] [place] [address] #: [phone] Route: Oral  
  
We Performed the Following CULTURE, URINE [id number] CPT(R)] Follow-up Instructions Return if symptoms worsen or fail to improve. Patient Instructions Urinary Tract Infection in Women: Care Instructions Your Care Instructions A urinary tract infection, or UTI, is a general term for an infection anywhere between the kidneys and the urethra (where urine comes out). Most UTIs are bladder infections. They often cause pain or burning when you urinate. UTIs are caused by bacteria and can be cured with antibiotics. Be sure to complete your treatment so that the infection goes away. Follow-up care is a key part of your treatment and safety. Be sure to make and go to all appointments, and call your doctor if you are having problems. It's also a good idea to know your test results and keep a list of the medicines you take. How can you care for yourself at home? · Take your antibiotics as directed. Do not stop taking them just because you feel better. You need to take the full course of antibiotics. · Drink extra water and other fluids for the next day or two. This may help wash out the bacteria that are causing the infection. (If you have kidney, heart, or liver disease and have to limit fluids, talk with your doctor before you increase your fluid intake.) · Avoid drinks that are carbonated or have caffeine. They can irritate the bladder. · Urinate often. Try to empty your bladder each time. · To relieve pain, take a hot bath or lay a heating pad set on low over your lower belly or genital area. Never go to sleep with a heating pad in place. To prevent UTIs · Drink plenty of water each day. This helps you urinate often, which clears bacteria from your system. (If you have kidney, heart, or liver disease and have to limit fluids, talk with your doctor before you increase your fluid intake.) · Urinate when you need to. · Urinate right after you have sex. · Change sanitary pads often. · Avoid douches, bubble baths, feminine hygiene sprays, and other feminine hygiene products that have deodorants. · After going to the bathroom, wipe from front to back. When should you call for help? Call your doctor now or seek immediate medical care if: · Symptoms such as fever, chills, nausea, or vomiting get worse or appear for the first time. · You have new pain in your back just below your rib cage. This is called flank pain. · There is new blood or pus in your urine. · You have any problems with your antibiotic medicine. Watch closely for changes in your health, and be sure to contact your doctor if: 
· You are not getting better after taking an antibiotic for 2 days. · Your symptoms go away but then come back. Where can you learn more? Go to http://ivy-yoandy.info/. Enter I354 in the search box to learn more about \"Urinary Tract Infection in Women: Care Instructions. \" Current as of: November 28, 2016 Content Version: 11.2 © 1090-1925 [employer]. Care instructions adapted under license by [employer] (which disclaims liability or warranty for this information). If you have questions about a medical condition or this instruction, always ask your healthcare professional. [place] [address] any warranty or liability for your use of this information. Introducing [facility]! Dear Tyrese Everett: 
Thank you for requesting a [employer] account. Our records indicate that you already have an active [employer] account. You can access your account anytime at [email or web address] [email or web address] Did you know that you can access your hospital and ER discharge instructions at any time in [employer]? You can also review all of your test results from your hospital stay or ER visit. Additional Information If you have questions, please visit the Frequently Asked Questions section of the [employer] website at [email or web address] Remember, [employer] is NOT to be used for urgent needs. For medical emergencies, dial 911. Now available from your iPhone and Android! Please provide this summary of care documentation to your next provider. Your primary care clinician is listed as Kristel Jimenez. If you have any questions after today's visit, please call [phone].

## 2017-05-02 NOTE — PATIENT INSTRUCTIONS
Urinary Tract Infection in Women: Care Instructions  Your Care Instructions    A urinary tract infection, or UTI, is a general term for an infection anywhere between the kidneys and the urethra (where urine comes out). Most UTIs are bladder infections. They often cause pain or burning when you urinate. UTIs are caused by bacteria and can be cured with antibiotics. Be sure to complete your treatment so that the infection goes away. Follow-up care is a key part of your treatment and safety. Be sure to make and go to all appointments, and call your doctor if you are having problems. It's also a good idea to know your test results and keep a list of the medicines you take. How can you care for yourself at home? · Take your antibiotics as directed. Do not stop taking them just because you feel better. You need to take the full course of antibiotics. · Drink extra water and other fluids for the next day or two. This may help wash out the bacteria that are causing the infection. (If you have kidney, heart, or liver disease and have to limit fluids, talk with your doctor before you increase your fluid intake.)  · Avoid drinks that are carbonated or have caffeine. They can irritate the bladder. · Urinate often. Try to empty your bladder each time. · To relieve pain, take a hot bath or lay a heating pad set on low over your lower belly or genital area. Never go to sleep with a heating pad in place. To prevent UTIs  · Drink plenty of water each day. This helps you urinate often, which clears bacteria from your system. (If you have kidney, heart, or liver disease and have to limit fluids, talk with your doctor before you increase your fluid intake.)  · Urinate when you need to. · Urinate right after you have sex. · Change sanitary pads often. · Avoid douches, bubble baths, feminine hygiene sprays, and other feminine hygiene products that have deodorants.   · After going to the bathroom, wipe from front to back.  When should you call for help? Call your doctor now or seek immediate medical care if:  · Symptoms such as fever, chills, nausea, or vomiting get worse or appear for the first time. · You have new pain in your back just below your rib cage. This is called flank pain. · There is new blood or pus in your urine. · You have any problems with your antibiotic medicine. Watch closely for changes in your health, and be sure to contact your doctor if:  · You are not getting better after taking an antibiotic for 2 days. · Your symptoms go away but then come back. Where can you learn more? Go to http://ivy-yoandy.info/. Enter I652 in the search box to learn more about \"Urinary Tract Infection in Women: Care Instructions. \"  Current as of: November 28, 2016  Content Version: 11.2  © 0057-6846 [employer], [employer]. Care instructions adapted under license by [employer] (which disclaims liability or warranty for this information). If you have questions about a medical condition or this instruction, always ask your healthcare professional. [address] any warranty or liability for your use of this information.

## 2017-05-02 NOTE — PROGRESS NOTES
Patient presents in office today with c/o bilateral flank pain, urinary frequency and urgency. Chief Complaint   Patient presents with    Urgency    Urinary Frequency     1. Have you been to the ER, urgent care clinic since your last visit? Hospitalized since your last visit? No    2. Have you seen or consulted any other health care providers outside of the [address] [place] [address] since your last visit? Include any pap smears or colon screening. No     PHQ over the last two weeks 5/2/2017   Little interest or pleasure in doing things Not at all   Feeling down, depressed or hopeless Not at all   Total Score PHQ 2 0     Fall Risk Assessment, last 12 mths 5/2/2017   Able to walk? Yes   Fall in past 12 months?  No       Vitals:    05/02/17 1355   BP: 102/60   BP 1 Location: Left arm   BP Patient Position: Sitting   Pulse: 88   Resp: 18   Temp: 98 °F (36.7 °C)   TempSrc: Oral   SpO2: 98%   Weight: 179 lb 8 oz (81.4 kg)   Height: 5' 2\" (1.575 m)

## 2017-05-02 NOTE — PROGRESS NOTES
Patient Name: Virgilio Baer   MRN: [id number]    Medina Soriano is a 73 y.o. female who presents with the following:     Patient does have a history of recurrent UTI; last episodes in early March s/p Macrobid which did provide relief. Patient does have a history of kidney stones s/p lithotripsy. Since last OV, pt has completed 7 days of ciprofloxacin 250 mg BID with some improvement in symptoms but still has some urinary frequency and urgency. Culture showed E coli pan sensitive UTI. Also began to have L flank pain which was similar to her prior kidney stone symptoms. Denies fever, abdominal pain, hematuria, nausea, vomiting. Review of Systems   Constitutional: Negative for fever, malaise/fatigue and weight loss. Respiratory: Negative for cough, hemoptysis, shortness of breath and wheezing. Cardiovascular: Negative for chest pain, palpitations, leg swelling and PND. Gastrointestinal: Negative for abdominal pain, constipation, diarrhea, nausea and vomiting. Genitourinary: Positive for flank pain, frequency and urgency. Negative for dysuria and hematuria. The patient's medications, allergies, past medical history, surgical history, family history and social history were reviewed and updated where appropriate. Prior to Admission medications    Medication Sig Start Date End Date Taking? Authorizing Provider   ciprofloxacin HCl (CIPRO) 500 mg tablet Take 1 Tab by mouth two (2) times a day for 7 days. 5/2/17 5/9/17 Yes Kristel Jimenez MD   olmesartan (BENICAR) 20 mg tablet Take 1 Tab by mouth daily.  4/21/17  Yes Conrad Vanessa MD   atorvastatin (LIPITOR) 40 mg tablet Take 1 tablet by mouth  daily 4/13/17  Yes Conrad Vanessa MD   chlorthalidone (HYGROTEN) 25 mg tablet Take 1 tablet by mouth  daily 9/27/16  Yes Torie Zelaya, DO   metFORMIN (GLUCOPHAGE) 500 mg tablet Take 1 tablet by mouth two  times daily with meals 9/27/16  Yes Torie Zelaya, DO   raloxifene (EVISTA) 60 mg tablet Take 1 tablet by mouth  daily 9/27/16  Yes Ebony Kaufman DO   valACYclovir (VALTREX) 500 mg tablet Take 1 tablet by mouth  daily 9/27/16  Yes Ebony Kaufman DO   glucose blood VI test strips (ACCU-CHEK SMARTVIEW TEST STRIP) strip Per insurance, testing 2 x weekly Dx: Diabetes uncontrolled 250.02 9/3/15  Yes Ebony Kaufman DO   Lancets misc Per insurance, testing 2 x weekly Dx: Diabetes uncontrolled 250.02 9/3/15  Yes Ebony Kaufman DO   multivitamin (ONE A DAY) tablet Take 1 Tab by mouth daily. Yes Historical Provider   OMEPRAZOLE MAGNESIUM (PRILOSEC OTC PO) Take  by mouth. Takes one po daily. Yes Historical Provider   naproxen sodium (ALEVE) 220 mg tablet Take 220 mg by mouth daily as needed. prn 8/24/10  Yes Historical Provider   GLUCOSAMINE HCL-MSM PO Take  by mouth. Historical Provider       Allergies   Allergen Reactions    Pcn [Penicillins] Itching     Lips itching    Femara [Letrozole] Other (comments)     htn           OBJECTIVE      Visit Vitals    /60 (BP 1 Location: Left arm, BP Patient Position: Sitting)    Pulse 88    Temp 98 °F (36.7 °C) (Oral)    Resp 18    Ht 5' 2\" (1.575 m)    Wt 179 lb 8 oz (81.4 kg)    SpO2 98%    BMI 32.83 kg/m2       Physical Exam   Constitutional: She is oriented to person, place, and time and well-developed, well-nourished, and in no distress. No distress. HENT:   Head: Normocephalic and atraumatic. Right Ear: External ear normal.   Left Ear: External ear normal.   Eyes: Conjunctivae and EOM are normal. Pupils are equal, round, and reactive to light. Musculoskeletal: She exhibits tenderness (negative CVA tenderness b/l). Neurological: She is alert and oriented to person, place, and time. She exhibits normal muscle tone. Gait normal.   Skin: She is not diaphoretic. Psychiatric: Mood, memory, affect and judgment normal.   Nursing note and vitals reviewed.       ASSESSMENT AND PLAN  Priyank Mclaughlin is a 73 y.o. female who presents today for:    1. Urinary tract infection without hematuria, site unspecified  Will extend abx course for pyelonephritis coverage. Offered Flomax for possible kidney stone treatment but pt deferred. Recommend adequate hydration. Will repeat culture. If pt still symptomatic after another 7 day course of cipro 500 mg BID, will refer to urology. - CULTURE, URINE  - ciprofloxacin HCl (CIPRO) 500 mg tablet; Take 1 Tab by mouth two (2) times a day for 7 days. Dispense: 14 Tab; Refill: 0       There are no discontinued medications. Follow-up Disposition:  Return if symptoms worsen or fail to improve. Medication risks/benefits/costs/interactions/alternatives discussed with patient. Advised patient to call back or return to office if symptoms worsen/change/persist. If patient cannot reach us or should anything more severe/urgent arise he/she should proceed directly to the nearest emergency department. Discussed expected course/resolution/complications of diagnosis in detail with patient. Patient given a written after visit summary which includes his/her diagnoses, current medications and vitals. Patient expressed understanding with the diagnosis and plan.      Lisbeth Capone M.D.

## 2017-05-04 LAB — BACTERIA UR CULT: NORMAL

## 2017-05-04 NOTE — PROGRESS NOTES
Discussed with pt via Mychart; will stop abx due to negative UTI.  Follow up with urology if symptoms persist.

## 2017-05-25 ENCOUNTER — OFFICE VISIT (OUTPATIENT)
Dept: FAMILY MEDICINE CLINIC | Age: 72
End: 2017-05-25

## 2017-05-25 VITALS
SYSTOLIC BLOOD PRESSURE: 116 MMHG | BODY MASS INDEX: 33.15 KG/M2 | TEMPERATURE: 97.5 F | HEIGHT: 62 IN | WEIGHT: 180.13 LBS | RESPIRATION RATE: 18 BRPM | DIASTOLIC BLOOD PRESSURE: 64 MMHG | OXYGEN SATURATION: 96 % | HEART RATE: 86 BPM

## 2017-05-25 DIAGNOSIS — E11.8 CONTROLLED TYPE 2 DIABETES MELLITUS WITH COMPLICATION, WITHOUT LONG-TERM CURRENT USE OF INSULIN (HCC): ICD-10-CM

## 2017-05-25 DIAGNOSIS — A60.00 GENITAL HERPES SIMPLEX, UNSPECIFIED SITE: Primary | ICD-10-CM

## 2017-05-25 DIAGNOSIS — Z13.820 SCREENING FOR OSTEOPOROSIS: ICD-10-CM

## 2017-05-25 DIAGNOSIS — Z85.3 HISTORY OF BREAST CANCER: ICD-10-CM

## 2017-05-25 DIAGNOSIS — E78.5 HYPERLIPIDEMIA, UNSPECIFIED HYPERLIPIDEMIA TYPE: ICD-10-CM

## 2017-05-25 DIAGNOSIS — Z78.0 POSTMENOPAUSAL: ICD-10-CM

## 2017-05-25 NOTE — PROGRESS NOTES
Patient presents inoffice today for htn and dm follow up     Chief Complaint   Patient presents with    Hypertension     routine visit    Diabetes     routine visit       1. Have you been to the ER, urgent care clinic since your last visit? Hospitalized since your last visit? No    2. Have you seen or consulted any other health care providers outside of the [address] [place] [address] since your last visit? Include any pap smears or colon screening. No     PHQ over the last two weeks 5/25/2017   Little interest or pleasure in doing things Not at all   Feeling down, depressed or hopeless Not at all   Total Score PHQ 2 0     Fall Risk Assessment, last 12 mths 5/25/2017   Able to walk? Yes   Fall in past 12 months?  No       Vitals:    05/25/17 1058   BP: 116/64   BP 1 Location: Left arm   BP Patient Position: Sitting   Pulse: 86   Resp: 18   Temp: 97.5 °F (36.4 °C)   TempSrc: Oral   SpO2: 96%   Weight: 180 lb 2 oz (81.7 kg)   Height: 5' 2\" (1.575 m)

## 2017-05-25 NOTE — PROGRESS NOTES
Patient Name: Brian Galeano   MRN: [id number]    [address] [place] [address] Anjel is a 73 y.o. female who presents with the following:     Hx of genital herpes  Takes Valtrex daily for suppression. Last outbreak about 2 weeks ago. Symptoms well controlled    Taking Evista daily for > 5 years. States she started it for osteoporosis prevention as well as her hx of breast cancer. Was on several hormonal therapies for breast cancer before. Has not followed up in several years as she remained in remission. No hx of CTE, CAD, or CVA. Would like routine blood work for DM and lipids for next visit. Review of Systems   Constitutional: Negative for fever, malaise/fatigue and weight loss. Respiratory: Negative for cough, hemoptysis, shortness of breath and wheezing. Cardiovascular: Negative for chest pain, palpitations, leg swelling and PND. Gastrointestinal: Negative for abdominal pain, constipation, diarrhea, nausea and vomiting. The patient's medications, allergies, past medical history, surgical history, family history and social history were reviewed and updated where appropriate. Prior to Admission medications    Medication Sig Start Date End Date Taking? Authorizing Provider   olmesartan (BENICAR) 20 mg tablet Take 1 Tab by mouth daily.  4/21/17  Yes Vero Newman MD   atorvastatin (LIPITOR) 40 mg tablet Take 1 tablet by mouth  daily 4/13/17  Yes Vero Newman MD   chlorthalidone (HYGROTEN) 25 mg tablet Take 1 tablet by mouth  daily 9/27/16  Yes Aiden De León DO   metFORMIN (GLUCOPHAGE) 500 mg tablet Take 1 tablet by mouth two  times daily with meals 9/27/16  Yes Aiden De León DO   raloxifene (EVISTA) 60 mg tablet Take 1 tablet by mouth  daily 9/27/16  Yes Aiden De León DO   valACYclovir (VALTREX) 500 mg tablet Take 1 tablet by mouth  daily 9/27/16  Yes Aiden De León DO   glucose blood VI test strips (ACCU-CHEK SMARTVIEW TEST STRIP) strip Per insurance, testing 2 x weekly Dx: Diabetes uncontrolled 250.02 9/3/15  Yes Jhonny Simms, DO   Lancets misc Per insurance, testing 2 x weekly Dx: Diabetes uncontrolled 250.02 9/3/15  Yes Jhonny Simms, DO   multivitamin (ONE A DAY) tablet Take 1 Tab by mouth daily. Yes Historical Provider   OMEPRAZOLE MAGNESIUM (PRILOSEC OTC PO) Take  by mouth. Takes one po daily. Yes Historical Provider   naproxen sodium (ALEVE) 220 mg tablet Take 220 mg by mouth daily as needed. prn 8/24/10  Yes Historical Provider       Allergies   Allergen Reactions    Pcn [Penicillins] Itching     Lips itching    Femara [Letrozole] Other (comments)     htn         Past Medical History:   Diagnosis Date    Breast cancer ([facility] 2003    lumpectomy + radiation right breast    Diabetes mellitus, controlled ([facility] 9/3/2015    Genital herpes 3/10/2010    GERD (gastroesophageal reflux disease)     HTN, goal below 140/90 3/8/2010    Hyperlipemia 11/26/2014    Osteopenia 3/10/2010       Past Surgical History:   Procedure Laterality Date    ENDOSCOPY, COLON, DIAGNOSTIC  2000    HX APPENDECTOMY      HX BREAST LUMPECTOMY      R breast - lumpectomy + radiation - on arimidex x 5 years - Dr. Mitch Zapata HX LITHOTRIPSY  2006    kidney stone    HX ORTHOPAEDIC  [id number]    bilat hips replaced - needs keflex prior to dental procedure    HX ORTHOPAEDIC      bilateral foot fusion of great hallux. hammer toe    HX TONSILLECTOMY         Family History   Problem Relation Age of Onset    Arthritis-osteo Mother     Stroke Father     Arthritis-osteo Maternal Grandmother        Social History     Social History    Marital status:      Spouse name: N/A    Number of children: N/A    Years of education: N/A     Occupational History    Not on file.      Social History Main Topics    Smoking status: Never Smoker    Smokeless tobacco: Never Used    Alcohol use Yes      Comment: red wine 7/wk    Drug use: No    Sexual activity: Yes     Partners: Male     Birth control/ protection: Condom     Other Topics Concern     Service No    Blood Transfusions No    Caffeine Concern No    Occupational Exposure No    Hobby Hazards No    Sleep Concern No    Stress Concern No    Weight Concern Yes     weight gain     Special Diet No    Back Care No    Exercise Yes     walks up stairs     Bike Helmet No    Seat Belt Yes    Self-Exams Yes     Social History Narrative           OBJECTIVE    Visit Vitals    /64 (BP 1 Location: Left arm, BP Patient Position: Sitting)    Pulse 86    Temp 97.5 °F (36.4 °C) (Oral)    Resp 18    Ht 5' 2\" (1.575 m)    Wt 180 lb 2 oz (81.7 kg)    SpO2 96%    BMI 32.95 kg/m2       Physical Exam   Constitutional: She is well-developed, well-nourished, and in no distress. No distress. HENT:   Head: Normocephalic and atraumatic. Right Ear: External ear normal.   Left Ear: External ear normal.   Eyes: Conjunctivae and EOM are normal. Pupils are equal, round, and reactive to light. Cardiovascular: Normal rate, regular rhythm and normal heart sounds. Exam reveals no gallop and no friction rub. No murmur heard. Pulmonary/Chest: Effort normal and breath sounds normal. No respiratory distress. She has no wheezes. Skin: She is not diaphoretic. Psychiatric: Mood, memory, affect and judgment normal.   Nursing note and vitals reviewed. ASSESSMENT AND PLAN  Brian Galeano is a 73 y.o. female who presents today for:    1. Genital herpes simplex, unspecified site  Stable, continue current treatment. 2. Screening for osteoporosis  Will obtain updated DEXA and calculate FRAX score. - DEXA BONE DENSITY STUDY AXIAL; Future    3. Postmenopausal  - DEXA BONE DENSITY STUDY AXIAL; Future    4. History of breast cancer  Pt to f/u with oncology regarding re: Evista therapy. Reviewed risks/benefits of medication. Would not recommend it for osteoporosis therapy due to weak evidence to decrease hip fractures.  Would appreciate oncology's input if she needs to continue Evista given hx of breast cancer or if this can be discontinued at this point.  - REFERRAL TO ONCOLOGY    5. Controlled type 2 diabetes mellitus with complication, without long-term current use of insulin (HCC)  - HEMOGLOBIN A1C WITH EAG  - METABOLIC PANEL, COMPREHENSIVE    6. Hyperlipidemia, unspecified hyperlipidemia type  - LIPID PANEL       Medications Discontinued During This Encounter   Medication Reason    GLUCOSAMINE HCL-MSM PO Not A Current Medication       Follow-up Disposition:  Return if symptoms worsen or fail to improve. Has upcoming appt in 3 months. Time: 25 minutes was spent with this patient face to face discussing test results, follow up visits, and when repeat testing. I discussed diagnoses, risk factors and treatment for each based on current recommendations and literature. Greater than 50% of total visit time was spent in counseling and coordination of care. Medication risks/benefits/costs/interactions/alternatives discussed with patient. Advised patient to call back or return to office if symptoms worsen/change/persist. If patient cannot reach us or should anything more severe/urgent arise he/she should proceed directly to the nearest emergency department. Discussed expected course/resolution/complications of diagnosis in detail with patient. Patient given a written after visit summary which includes his/her diagnoses, current medications and vitals. Patient expressed understanding with the diagnosis and plan.      Pramod Jung M.D.

## 2017-05-25 NOTE — MR AVS SNAPSHOT
Visit Information Date & Time Provider Department Dept. Phone Encounter #  
 5/25/2017 10:45 AM Vero Newman MD [address] [place] [address] [address] [phone] [id number] Follow-up Instructions Return if symptoms worsen or fail to improve. Your Appointments 7/7/2017  8:00 AM  
ROUTINE CARE with Vero Newman MD  
[facility]) Appt Note: follow up  
 [address] [phone]  
[phone]  
  
   
 [address] [phone] Upcoming Health Maintenance Date Due Bone Densitometry 11/5/1963 MEDICARE YEARLY EXAM 4/30/2016 LIPID PANEL Q1 4/7/2017 HEMOGLOBIN A1C Q6M 7/6/2017 FOOT EXAM Q1 7/7/2017 MICROALBUMIN Q1 7/8/2017 INFLUENZA AGE 9 TO ADULT 8/1/2017 EYE EXAM RETINAL OR DILATED Q1 12/27/2017 BREAST CANCER SCRN MAMMOGRAM 1/8/2018 GLAUCOMA SCREENING Q2Y 12/27/2018 COLONOSCOPY 6/5/2022 DTaP/Tdap/Td series (2 - Td) 4/1/2024 Allergies as of 5/25/2017  Review Complete On: 5/25/2017 By: Vero Newman MD  
  
 Severity Noted Reaction Type Reactions Pcn [Penicillins] High 03/03/2010    Itching Lips itching Femara [Letrozole]  03/03/2010    Other (comments)  
 htn Current Immunizations  Reviewed on 9/18/2014 Name Date Influenza High Dose Vaccine PF 10/5/2016, 9/28/2015, 9/18/2014 Influenza Vaccine 8/25/2013 Influenza Vaccine Whole 9/1/2011 Pneumococcal Polysaccharide (PPSV-23) 9/18/2014 Pneumococcal Vaccine (Unspecified Type) 12/14/2011 Tdap 4/1/2014 Zoster 11/2/2012 Not reviewed this visit You Were Diagnosed With   
  
 Codes Comments Controlled type 2 diabetes mellitus with complication, without long-term current use of insulin ([facility]    -  Primary ICD-10-CM: E11.8 ICD-9-CM: 250.90 Hyperlipidemia, unspecified hyperlipidemia type     ICD-10-CM: E78.5 ICD-9-CM: 272.4 Screening for osteoporosis     ICD-10-CM: Z13.820 ICD-9-CM: V82.81 Postmenopausal     ICD-10-CM: Z78.0 ICD-9-CM: V49.81 History of breast cancer     ICD-10-CM: Z85.3 ICD-9-CM: V10.3 Vitals BP Pulse Temp Resp Height(growth percentile) Weight(growth percentile) 116/64 (BP 1 Location: Left arm, BP Patient Position: Sitting) 86 97.5 °F (36.4 °C) (Oral) 18 5' 2\" (1.575 m) 180 lb 2 oz (81.7 kg) SpO2 BMI OB Status Smoking Status 96% 32.95 kg/m2 Postmenopausal Never Smoker BMI and BSA Data Body Mass Index Body Surface Area 32.95 kg/m 2 1.89 m 2 Preferred Pharmacy Pharmacy Name Phone [address] [facility], [address] [facility] [address] Jeremias Portillo [address] Your Updated Medication List  
  
   
This list is accurate as of: 5/25/17 11:28 AM.  Always use your most recent med list.  
  
  
  
  
 ALEVE 220 mg tablet Generic drug:  naproxen sodium Take 220 mg by mouth daily as needed. prn  
  
 atorvastatin 40 mg tablet Commonly known as:  LIPITOR Take 1 tablet by mouth  daily  
  
 chlorthalidone 25 mg tablet Commonly known as:  Deon Inches Take 1 tablet by mouth  daily  
  
 glucose blood VI test strips strip Commonly known as:  [address] Per insurance, testing 2 x weekly Dx: Diabetes uncontrolled 250.02 Lancets Misc Per insurance, testing 2 x weekly Dx: Diabetes uncontrolled 250.02  
  
 metFORMIN 500 mg tablet Commonly known as:  GLUCOPHAGE Take 1 tablet by mouth two  times daily with meals  
  
 multivitamin tablet Commonly known as:  ONE A DAY Take 1 Tab by mouth daily. olmesartan 20 mg tablet Commonly known as:  [employer] Take 1 Tab by mouth daily. PRILOSEC OTC PO Take  by mouth. Takes one po daily. raloxifene 60 mg tablet Commonly known as:  EVISTA Take 1 tablet by mouth  daily  
  
 valACYclovir 500 mg tablet Commonly known as:  VALTREX Take 1 tablet by mouth  daily We Performed the Following HEMOGLOBIN A1C WITH EAG [46105 CPT(R)] LIPID PANEL [65676 CPT(R)] METABOLIC PANEL, COMPREHENSIVE [15804 CPT(R)] REFERRAL TO ONCOLOGY [IYZ21 Custom] Comments:  
 Emi Caller Follow-up Instructions Return if symptoms worsen or fail to improve. To-Do List   
 05/25/2017 Imaging:  DEXA BONE DENSITY STUDY AXIAL Referral Information Referral ID Referred By Referred To  
  
 [id number] Ioana BRANCH MD   
   [address] [place] [address] Phone: [phone] Fax: [phone] Visits Status Start Date End Date 1 New Request 5/25/17 5/25/18 If your referral has a status of pending review or denied, additional information will be sent to support the outcome of this decision. Patient Instructions DASH Diet: Care Instructions Your Care Instructions The DASH diet is an eating plan that can help lower your blood pressure. DASH stands for Dietary Approaches to Stop Hypertension. Hypertension is high blood pressure. The DASH diet focuses on eating foods that are high in calcium, potassium, and magnesium. These nutrients can lower blood pressure. The foods that are highest in these nutrients are fruits, vegetables, low-fat dairy products, nuts, seeds, and legumes. But taking calcium, potassium, and magnesium supplements instead of eating foods that are high in those nutrients does not have the same effect. The DASH diet also includes whole grains, fish, and poultry. The DASH diet is one of several lifestyle changes your doctor may recommend to lower your high blood pressure. Your doctor may also want you to decrease the amount of sodium in your diet. Lowering sodium while following the DASH diet can lower blood pressure even further than just the DASH diet alone. Follow-up care is a key part of your treatment and safety.  Be sure to make and go to all appointments, and call your doctor if you are having problems. It's also a good idea to know your test results and keep a list of the medicines you take. How can you care for yourself at home? Following the DASH diet · Eat 4 to 5 servings of fruit each day. A serving is 1 medium-sized piece of fruit, ½ cup chopped or canned fruit, 1/4 cup dried fruit, or 4 ounces (½ cup) of fruit juice. Choose fruit more often than fruit juice. · Eat 4 to 5 servings of vegetables each day. A serving is 1 cup of lettuce or raw leafy vegetables, ½ cup of chopped or cooked vegetables, or 4 ounces (½ cup) of vegetable juice. Choose vegetables more often than vegetable juice. · Get 2 to 3 servings of low-fat and fat-free dairy each day. A serving is 8 ounces of milk, 1 cup of yogurt, or 1 ½ ounces of cheese. · Eat 6 to 8 servings of grains each day. A serving is 1 slice of bread, 1 ounce of dry cereal, or ½ cup of cooked rice, pasta, or cooked cereal. Try to choose whole-grain products as much as possible. · Limit lean meat, poultry, and fish to 2 servings each day. A serving is 3 ounces, about the size of a deck of cards. · Eat 4 to 5 servings of nuts, seeds, and legumes (cooked dried beans, lentils, and split peas) each week. A serving is 1/3 cup of nuts, 2 tablespoons of seeds, or ½ cup of cooked beans or peas. · Limit fats and oils to 2 to 3 servings each day. A serving is 1 teaspoon of vegetable oil or 2 tablespoons of salad dressing. · Limit sweets and added sugars to 5 servings or less a week. A serving is 1 tablespoon jelly or jam, ½ cup sorbet, or 1 cup of lemonade. · Eat less than 2,300 milligrams (mg) of sodium a day. If you limit your sodium to 1,500 mg a day, you can lower your blood pressure even more. Tips for success · Start small. Do not try to make dramatic changes to your diet all at once.  You might feel that you are missing out on your favorite foods and then be more likely to not follow the plan. Make small changes, and stick with them. Once those changes become habit, add a few more changes. · Try some of the following: ¨ Make it a goal to eat a fruit or vegetable at every meal and at snacks. This will make it easy to get the recommended amount of fruits and vegetables each day. ¨ Try yogurt topped with fruit and nuts for a snack or healthy dessert. ¨ Add lettuce, tomato, cucumber, and onion to sandwiches. ¨ Combine a ready-made pizza crust with low-fat mozzarella cheese and lots of vegetable toppings. Try using tomatoes, squash, spinach, broccoli, carrots, cauliflower, and onions. ¨ Have a variety of cut-up vegetables with a low-fat dip as an appetizer instead of chips and dip. ¨ Sprinkle sunflower seeds or chopped almonds over salads. Or try adding chopped walnuts or almonds to cooked vegetables. ¨ Try some vegetarian meals using beans and peas. Add garbanzo or kidney beans to salads. Make burritos and tacos with mashed hall beans or black beans. Where can you learn more? Go to http://ivy-yoandy.info/. Enter E441 in the search box to learn more about \"DASH Diet: Care Instructions. \" Current as of: March 23, 2016 Content Version: 11.2 © 7791-4238 [employer]. Care instructions adapted under license by [employer] (which disclaims liability or warranty for this information). If you have questions about a medical condition or this instruction, always ask your healthcare professional. [place] [address] any warranty or liability for your use of this information. Introducing [facility]! Dear Tyrese Everett: 
Thank you for requesting a SCM-GL account. Our records indicate that you already have an active SCM-GL account. You can access your account anytime at [email or web address] Did you know that you can access your hospital and ER discharge instructions at any time in No Paper Just Vapor? You can also review all of your test results from your hospital stay or ER visit. Additional Information If you have questions, please visit the Frequently Asked Questions section of the No Paper Just Vapor website at [email or web address] Remember, No Paper Just Vapor is NOT to be used for urgent needs. For medical emergencies, dial 911. Now available from your iPhone and Android! Please provide this summary of care documentation to your next provider. Your primary care clinician is listed as Kristel Jimenez. If you have any questions after today's visit, please call [phone].

## 2017-05-25 NOTE — PATIENT INSTRUCTIONS

## 2017-06-01 ENCOUNTER — HOSPITAL ENCOUNTER (OUTPATIENT)
Dept: BONE DENSITY | Age: 72
Discharge: HOME | End: 2017-06-01
Attending: FAMILY MEDICINE
Payer: MEDICARE

## 2017-06-01 DIAGNOSIS — Z13.820 SCREENING FOR OSTEOPOROSIS: ICD-10-CM

## 2017-06-01 DIAGNOSIS — Z78.0 POSTMENOPAUSAL: ICD-10-CM

## 2017-06-01 PROCEDURE — 77080 DXA BONE DENSITY AXIAL: CPT

## 2017-06-08 ENCOUNTER — PATIENT MESSAGE (OUTPATIENT)
Dept: FAMILY MEDICINE CLINIC | Age: 72
End: 2017-06-08

## 2017-06-08 DIAGNOSIS — A60.00 GENITAL HERPES SIMPLEX, UNSPECIFIED SITE: ICD-10-CM

## 2017-06-08 DIAGNOSIS — I10 HTN, GOAL BELOW 150/90: ICD-10-CM

## 2017-06-08 DIAGNOSIS — I10 HTN, GOAL BELOW 140/90: ICD-10-CM

## 2017-06-08 DIAGNOSIS — C50.919 MALIGNANT NEOPLASM OF FEMALE BREAST, UNSPECIFIED LATERALITY, UNSPECIFIED SITE OF BREAST: Primary | ICD-10-CM

## 2017-06-08 DIAGNOSIS — E11.9 DIABETES TYPE 2, CONTROLLED (HCC): Primary | ICD-10-CM

## 2017-06-08 DIAGNOSIS — C50.919 MALIGNANT NEOPLASM OF FEMALE BREAST, UNSPECIFIED LATERALITY, UNSPECIFIED SITE OF BREAST: ICD-10-CM

## 2017-06-08 RX ORDER — OLMESARTAN MEDOXOMIL 20 MG/1
TABLET ORAL
Qty: 90 TAB | Refills: 0 | Status: SHIPPED | OUTPATIENT
Start: 2017-06-08 | End: 2017-11-06 | Stop reason: SDUPTHER

## 2017-06-08 NOTE — PROGRESS NOTES
Released to [address] Your DEXA scan shows that you meet the criteria of osteoporosis. Since you have been on Evista for many years, please follow up with oncology to discuss if you need to stay on this medication. You may be a candidate for better osteoporosis medications than Evista which can be discussed after seeing an oncologist.  For osteopenia or osteoporosis, it is recommended to do weight-bearing exercises, stop smoking, and to get at least vitamin D3 800 units/day and calcium at 1500 mg/day.

## 2017-06-13 RX ORDER — RALOXIFENE HYDROCHLORIDE 60 MG/1
TABLET, FILM COATED ORAL
Qty: 90 TAB | Refills: 0 | Status: SHIPPED | OUTPATIENT
Start: 2017-06-13 | End: 2017-07-07

## 2017-06-13 RX ORDER — VALACYCLOVIR HYDROCHLORIDE 500 MG/1
TABLET, FILM COATED ORAL
Qty: 90 TAB | Refills: 1 | Status: SHIPPED | OUTPATIENT
Start: 2017-06-13 | End: 2017-12-31 | Stop reason: SDUPTHER

## 2017-06-13 RX ORDER — CHLORTHALIDONE 25 MG/1
TABLET ORAL
Qty: 90 TAB | Refills: 1 | Status: SHIPPED | OUTPATIENT
Start: 2017-06-13 | End: 2017-12-31 | Stop reason: SDUPTHER

## 2017-06-13 RX ORDER — METFORMIN HYDROCHLORIDE 500 MG/1
TABLET ORAL
Qty: 180 TAB | Refills: 1 | Status: SHIPPED | OUTPATIENT
Start: 2017-06-13 | End: 2017-12-31 | Stop reason: SDUPTHER

## 2017-06-13 NOTE — TELEPHONE ENCOUNTER
Received faxed refill request for this medication from the pharmacy that is on file.     metFORMIN (GLUCOPHAGE) 500 mg tablet [Pharmacy Med Name: METFORMIN  500MG  TAB]  Take 1 tablet by mouth two  times daily with meals, Normal, Disp-180 Tab    chlorthalidone (HYGROTEN) 25 mg tablet [Pharmacy Med Name: CHLORTHALIDONE  25MG  TAB]  Take 1 tablet by mouth  daily, Normal, Disp-90 Tab    raloxifene (EVISTA) 60 mg tablet [Pharmacy Med Name: NLXCBTOWKG  81SF  TZG]  Take 1 tablet by mouth  daily, Normal, Disp-90 Tab    valACYclovir (VALTREX) 500 mg tablet [Pharmacy Med Name: VALACYCLOVIR  500MG  TAB]  Take 1 tablet by mouth  daily, Normal, Disp-90 Tab

## 2017-06-16 ENCOUNTER — OFFICE VISIT (OUTPATIENT)
Dept: ONCOLOGY | Age: 72
End: 2017-06-16

## 2017-06-16 VITALS
HEIGHT: 62 IN | SYSTOLIC BLOOD PRESSURE: 117 MMHG | HEART RATE: 86 BPM | RESPIRATION RATE: 20 BRPM | BODY MASS INDEX: 33.09 KG/M2 | DIASTOLIC BLOOD PRESSURE: 83 MMHG | TEMPERATURE: 98 F | WEIGHT: 179.8 LBS | OXYGEN SATURATION: 98 %

## 2017-06-16 DIAGNOSIS — C50.911 MALIGNANT NEOPLASM OF RIGHT FEMALE BREAST, UNSPECIFIED SITE OF BREAST: Primary | ICD-10-CM

## 2017-06-16 DIAGNOSIS — M80.00XD AGE-RELATED OSTEOPOROSIS WITH CURRENT PATHOLOGICAL FRACTURE WITH ROUTINE HEALING, SUBSEQUENT ENCOUNTER: ICD-10-CM

## 2017-06-16 RX ORDER — MULTIVITAMIN
1 TABLET ORAL DAILY
COMMUNITY

## 2017-06-16 NOTE — PROGRESS NOTES
Hematology/Oncology Consult    REASON FOR CONSULT: H/O Breast cancer  REQUESTED BY: Dr. Fermin : Ms. Jaden Izaguirre is a very pleasant 73 y.o. female who presents to [facility] care given a h/o breast cancer    She was diagnosed with invasive ductal carcinoma of the Right breast in 2003. She had a Lumpectomy done by Dr. Aruna Reynaga at [facility]. She states that it was a stage I. This was followed by XRT and then adjuvant Arimidex for 5 years. States that carcinoma had tubular features. DEXA on 6/1/17 showed osteoporosis. Previously has not tolerated Fosamax or Boniva. She is not sure what the nature of the intolerance . On Evista for 7 years for osteoporosis. She is having annual mammograms. She has no new lumps or bumps. She had a DVT on Tamoxifen. None since. Has had no fevers, chills, HA, vision changes, Falls, new lumps, abnormal bleeding, weight or appetite changes.        Past Medical History:   Diagnosis Date    Breast cancer [facility]) 2003    lumpectomy + radiation right breast    Diabetes mellitus, controlled ([facility] 9/3/2015    Genital herpes 3/10/2010    GERD (gastroesophageal reflux disease)     HTN, goal below 140/90 3/8/2010    Hyperlipemia 11/26/2014    Osteopenia 3/10/2010       Past Surgical History:   Procedure Laterality Date    ENDOSCOPY, COLON, DIAGNOSTIC  2000    HX APPENDECTOMY      HX BREAST LUMPECTOMY      R breast - lumpectomy + radiation - on arimidex x 5 years - Dr. Paola Taveras HX LITHOTRIPSY  2006    kidney stone    HX ORTHOPAEDIC  [id number]    bilat hips replaced - needs keflex prior to dental procedure    HX ORTHOPAEDIC      bilateral foot fusion of great hallux. hammer toe    HX TONSILLECTOMY         Allergies   Allergen Reactions    Pcn [Penicillins] Itching     Lips itching    Femara [Letrozole] Other (comments)     htn       Current Outpatient Prescriptions   Medication Sig Dispense Refill    calcium-cholecalciferol, D3, (CALTRATE 600+D) tablet Take 1 Tab by mouth daily.  metFORMIN (GLUCOPHAGE) 500 mg tablet Take 1 tablet by mouth two  times daily with meals 180 Tab 1    chlorthalidone (HYGROTEN) 25 mg tablet Take 1 tablet by mouth  daily 90 Tab 1    raloxifene (EVISTA) 60 mg tablet Take 1 tablet by mouth  daily 90 Tab 0    valACYclovir (VALTREX) 500 mg tablet Take 1 tablet by mouth  daily 90 Tab 1    olmesartan (BENICAR) 20 mg tablet Take 1 tablet by mouth  daily 90 Tab 0    atorvastatin (LIPITOR) 40 mg tablet Take 1 tablet by mouth  daily 90 Tab 0    glucose blood VI test strips (ACCU-CHEK SMARTVIEW TEST STRIP) strip Per insurance, testing 2 x weekly Dx: Diabetes uncontrolled 250.02 1 Each 11    Lancets misc Per insurance, testing 2 x weekly Dx: Diabetes uncontrolled 250.02 1 Each 11    multivitamin (ONE A DAY) tablet Take 1 Tab by mouth daily.  OMEPRAZOLE MAGNESIUM (PRILOSEC OTC PO) Take  by mouth. Takes one po daily.  naproxen sodium (ALEVE) 220 mg tablet Take 220 mg by mouth daily as needed.  prn         Social History     Social History    Marital status:      Spouse name: N/A    Number of children: N/A    Years of education: N/A     Social History Main Topics    Smoking status: Never Smoker    Smokeless tobacco: Never Used    Alcohol use Yes      Comment: red wine 7/wk    Drug use: No    Sexual activity: Yes     Partners: Male     Birth control/ protection: Condom     Other Topics Concern     Service No    Blood Transfusions No    Caffeine Concern No    Occupational Exposure No    Hobby Hazards No    Sleep Concern No    Stress Concern No    Weight Concern Yes     weight gain     Special Diet No    Back Care No    Exercise Yes     walks up stairs     Bike Helmet No    Seat Belt Yes    Self-Exams Yes     Social History Narrative       Family History   Problem Relation Age of Onset    Arthritis-osteo Mother     Stroke Father     Arthritis-osteo Maternal Grandmother        ROS  A 12 point review of systems was obtained and is negative except as listed in HPI. ECOG PS is 0    Physical Examination:   Visit Vitals    /83    Pulse 86    Temp 98 °F (36.7 °C)    Resp 20    Ht 5' 2\" (1.575 m)    Wt 179 lb 12.8 oz (81.6 kg)    SpO2 98%    BMI 32.89 kg/m2     General appearance - alert, well appearing, and in no distress  Mental status - oriented to person, place, and time  Mouth - mucous membranes moist, pharynx normal without lesions  Neck - supple, no significant adenopathy  Lymphatics - no palpable lymphadenopathy, no hepatosplenomegaly  Chest - clear to auscultation, no wheezes, rales or rhonchi, symmetric air entry  Heart - normal rate, regular rhythm, normal S1, S2, no murmurs, rubs, clicks or gallops  Abdomen - soft, nontender, nondistended, no masses or organomegaly, bowel sounds present  Back exam - full range of motion, no tenderness, palpable spasm or pain on motion  Neurological - normal speech, no focal findings or movement disorder noted  Musculoskeletal - no joint tenderness, deformity or swelling  Extremities - peripheral pulses normal, no pedal edema, no clubbing or cyanosis  Skin - normal coloration and turgor, no rashes, no suspicious skin lesions noted    LABS  Lab Results   Component Value Date/Time    WBC 5.5 10/30/2014 10:22 AM    HGB 13.5 10/30/2014 10:22 AM    HCT 41.5 10/30/2014 10:22 AM    PLATELET 548 11/31/2045 10:22 AM    MCV 98 10/30/2014 10:22 AM    ABS. NEUTROPHILS 3.2 10/30/2014 10:22 AM     Lab Results   Component Value Date/Time    Sodium 138 09/03/2015 10:40 AM    Potassium 4.5 09/03/2015 10:40 AM    Chloride 97 09/03/2015 10:40 AM    CO2 24 09/03/2015 10:40 AM    Glucose 99 09/03/2015 10:40 AM    BUN 20 09/03/2015 10:40 AM    Creatinine 0.94 09/03/2015 10:40 AM    GFR est AA 73 09/03/2015 10:40 AM    GFR est non-AA 63 09/03/2015 10:40 AM    Calcium 9.8 09/03/2015 10:40 AM     Lab Results   Component Value Date/Time    AST (SGOT) 19 04/30/2015 08:47 AM    Alk. phosphatase 95 04/30/2015 08:47 AM    Protein, total 6.7 04/30/2015 08:47 AM    Albumin 4.4 04/30/2015 08:47 AM    Globulin 3.1 09/02/2009 04:10 PM    A-G Ratio 1.8 10/30/2014 10:22 AM       ASSESSMENT  Ms. Abhay Goode is a 73 y.o. female with stage I R breast IDC s/p lumpectomy, XRT and 5 years of Arimidex. She has osteoporosis for which she has been on Evista. DISCUSSION  Reviewed NCCN guidelines for Breast cancer surveillance which recommends annual exams and Mammogram    As regards osteoporosis would recommend 3 years of yearly Zoledronic acid . Reviewed that Evista has not been studied in patients who have a h/o breast cancer    PLAN    - Zoledronic acid yearly for osteoporosis- will arrange  - Stop Evista  - RTC 1 year  - Due for a mammogram. Gets them with CARLOS ALBERTO Segura MD    Ms. Abhay Goode has a reminder for a \"due or due soon\" health maintenance. I have asked that she contact her primary care provider for follow-up on this health maintenance.

## 2017-07-05 ENCOUNTER — HOSPITAL ENCOUNTER (OUTPATIENT)
Dept: LAB | Age: 72
Discharge: HOME | End: 2017-07-05
Payer: MEDICARE

## 2017-07-05 PROCEDURE — 80061 LIPID PANEL: CPT

## 2017-07-05 PROCEDURE — 80053 COMPREHEN METABOLIC PANEL: CPT

## 2017-07-05 PROCEDURE — 83036 HEMOGLOBIN GLYCOSYLATED A1C: CPT

## 2017-07-05 PROCEDURE — 36415 COLL VENOUS BLD VENIPUNCTURE: CPT

## 2017-07-06 LAB
ALBUMIN SERPL-MCNC: 4.2 G/DL (ref 3.6–4.8)
ALBUMIN/GLOB SERPL: 1.8 {RATIO} (ref 1.2–2.2)
ALP SERPL-CCNC: 72 IU/L (ref 39–117)
ALT SERPL-CCNC: 6 IU/L (ref 0–32)
AST SERPL-CCNC: 17 IU/L (ref 0–40)
BILIRUB SERPL-MCNC: 0.4 MG/DL (ref 0–1.2)
BUN SERPL-MCNC: 20 MG/DL (ref 8–27)
BUN/CREAT SERPL: 24 (ref 12–28)
CALCIUM SERPL-MCNC: 9.7 MG/DL (ref 8.7–10.3)
CHLORIDE SERPL-SCNC: 98 MMOL/L (ref 96–106)
CHOLEST SERPL-MCNC: 161 MG/DL (ref 100–199)
CO2 SERPL-SCNC: 25 MMOL/L (ref 18–29)
CREAT SERPL-MCNC: 0.83 MG/DL (ref 0.57–1)
EST. AVERAGE GLUCOSE BLD GHB EST-MCNC: 126 MG/DL
GLOBULIN SER CALC-MCNC: 2.3 G/DL (ref 1.5–4.5)
GLUCOSE SERPL-MCNC: 105 MG/DL (ref 65–99)
HBA1C MFR BLD: 6 % (ref 4.8–5.6)
HDLC SERPL-MCNC: 64 MG/DL
INTERPRETATION, 910389: NORMAL
LDLC SERPL CALC-MCNC: 80 MG/DL (ref 0–99)
POTASSIUM SERPL-SCNC: 4.5 MMOL/L (ref 3.5–5.2)
PROT SERPL-MCNC: 6.5 G/DL (ref 6–8.5)
SODIUM SERPL-SCNC: 138 MMOL/L (ref 134–144)
TRIGL SERPL-MCNC: 83 MG/DL (ref 0–149)
VLDLC SERPL CALC-MCNC: 17 MG/DL (ref 5–40)

## 2017-07-07 ENCOUNTER — HOSPITAL ENCOUNTER (OUTPATIENT)
Dept: LAB | Age: 72
Discharge: HOME | End: 2017-07-07
Payer: MEDICARE

## 2017-07-07 ENCOUNTER — OFFICE VISIT (OUTPATIENT)
Dept: FAMILY MEDICINE CLINIC | Age: 72
End: 2017-07-07

## 2017-07-07 VITALS
HEIGHT: 62 IN | RESPIRATION RATE: 18 BRPM | SYSTOLIC BLOOD PRESSURE: 124 MMHG | OXYGEN SATURATION: 98 % | BODY MASS INDEX: 32.94 KG/M2 | TEMPERATURE: 98.1 F | HEART RATE: 88 BPM | DIASTOLIC BLOOD PRESSURE: 80 MMHG | WEIGHT: 179 LBS

## 2017-07-07 DIAGNOSIS — Z00.00 ENCOUNTER FOR MEDICARE ANNUAL WELLNESS EXAM: Primary | ICD-10-CM

## 2017-07-07 DIAGNOSIS — Z13.39 SCREENING FOR ALCOHOLISM: ICD-10-CM

## 2017-07-07 DIAGNOSIS — L98.9 SKIN LESION: ICD-10-CM

## 2017-07-07 DIAGNOSIS — E78.5 HYPERLIPIDEMIA, UNSPECIFIED HYPERLIPIDEMIA TYPE: ICD-10-CM

## 2017-07-07 DIAGNOSIS — Z13.31 SCREENING FOR DEPRESSION: ICD-10-CM

## 2017-07-07 DIAGNOSIS — E11.8 CONTROLLED TYPE 2 DIABETES MELLITUS WITH COMPLICATION, WITHOUT LONG-TERM CURRENT USE OF INSULIN (HCC): ICD-10-CM

## 2017-07-07 PROCEDURE — 82043 UR ALBUMIN QUANTITATIVE: CPT

## 2017-07-07 PROCEDURE — 36415 COLL VENOUS BLD VENIPUNCTURE: CPT

## 2017-07-07 NOTE — PROGRESS NOTES
Chief Complaint   Patient presents with    Cholesterol Problem     routine office visit    Diabetes     routine office visit    Skin Problem     bump on left middle finger     1. Have you been to the ER, urgent care clinic since your last visit? Hospitalized since your last visit? No    2. Have you seen or consulted any other health care providers outside of the [facility] since your last visit? Include any pap smears or colon screening. No     Fall Risk Assessment, last 12 mths 7/7/2017   Able to walk? Yes   Fall in past 12 months? Yes   Fall with injury?  Yes   Number of falls in past 12 months 1   Fall Risk Score 2     PHQ over the last two weeks 7/7/2017   Little interest or pleasure in doing things Not at all   Feeling down, depressed or hopeless Not at all   Total Score PHQ 2 0

## 2017-07-07 NOTE — PROGRESS NOTES
Patient Name: Ameya Booker   MRN: [id number]    [address] [place] [address] Anjel is a 73 y.o. female who presents with the following:     She is seen for diabetes, hypertension and hyperlipidemia. Since last visit she reports no chest pain, dyspnea or TIA's, no numbness, tingling or pain in extremities, no unusual visual symptoms, no hypoglycemia, no medication side effects noted. Home glucose monitoring: is performed regularly, values are usually normal.  She reports medication compliance: compliant all of the time. Medication side effects: none. Diabetic diet compliance: compliant all of the time. Lab review: labs reviewed and discussed with patient. Eye exam: UTD. Lab Results   Component Value Date/Time    Hemoglobin A1c 6.0 07/05/2017 08:15 AM    Hemoglobin A1c (POC) 5.6 04/07/2016 08:42 AM     Has two bumps on her fingers, one on the L 3rd and 4th finger, underneath nail. Feels that it fills up with fluid then deflates. Has not tried anything for it. Had a similar lesion on her 5th finger which was removed. Works with her hands often and does not want to do anything invasive to remove lesion. Denies discharge, pain, limited ROM. Review of Systems   Constitutional: Negative for fever, malaise/fatigue and weight loss. Respiratory: Negative for cough, hemoptysis, shortness of breath and wheezing. Cardiovascular: Negative for chest pain, palpitations, leg swelling and PND. Gastrointestinal: Negative for abdominal pain, constipation, diarrhea, nausea and vomiting. The patient's medications, allergies, past medical history, surgical history, family history and social history were reviewed and updated where appropriate. Prior to Admission medications    Medication Sig Start Date End Date Taking? Authorizing Provider   calcium-cholecalciferol, D3, (CALTRATE 600+D) tablet Take 1 Tab by mouth daily.    Yes Historical Provider   metFORMIN (GLUCOPHAGE) 500 mg tablet Take 1 tablet by mouth two times daily with meals 6/13/17  Yes Naomy Woods MD   chlorthalidone (HYGROTEN) 25 mg tablet Take 1 tablet by mouth  daily 6/13/17  Yes Naomy Woods MD   valACYclovir (VALTREX) 500 mg tablet Take 1 tablet by mouth  daily 6/13/17  Yes Naomy Woods MD   olmesartan (BENICAR) 20 mg tablet Take 1 tablet by mouth  daily 6/8/17  Yes Naomy Woods MD   atorvastatin (LIPITOR) 40 mg tablet Take 1 tablet by mouth  daily 4/13/17  Yes MD Iman Callaway misc Per insurance, testing 2 x weekly Dx: Diabetes uncontrolled 250.02 9/3/15  Yes Timothy Robert, DO   multivitamin (ONE A DAY) tablet Take 1 Tab by mouth daily. Yes Historical Provider   OMEPRAZOLE MAGNESIUM (PRILOSEC OTC PO) Take  by mouth. Takes one po daily. Yes Historical Provider   naproxen sodium (ALEVE) 220 mg tablet Take 220 mg by mouth daily as needed. prn 8/24/10  Yes Historical Provider   glucose blood VI test strips (ACCU-CHEK SMARTVIEW TEST STRIP) strip Per insurance, testing 2 x weekly Dx: Diabetes uncontrolled 250.02 9/3/15   Timothysloane Robert, DO       Allergies   Allergen Reactions    Pcn [Penicillins] Itching     Lips itching    Femara [Letrozole] Other (comments)     htn           OBJECTIVE    Visit Vitals    /80 (BP 1 Location: Left arm, BP Patient Position: Sitting)    Pulse 88    Temp 98.1 °F (36.7 °C) (Oral)    Resp 18    Ht 5' 2\" (1.575 m)    Wt 179 lb (81.2 kg)    SpO2 98%    BMI 32.74 kg/m2       Physical Exam   Constitutional: She is well-developed, well-nourished, and in no distress. No distress. Cardiovascular: Normal rate, regular rhythm and normal heart sounds. Exam reveals no gallop and no friction rub. No murmur heard. Pulmonary/Chest: Effort normal and breath sounds normal. No respiratory distress. She has no wheezes. Musculoskeletal: Normal range of motion. She exhibits no edema, tenderness or deformity.         Hands:  Neurological:   Sensory exam of the foot is normal, tested with the monofilament. Good pulses, no lesions or ulcers. Skin: Skin is warm and dry. No rash noted. She is not diaphoretic. No erythema. Nursing note and vitals reviewed. ASSESSMENT AND PLAN  Nikki May is a 73 y.o. female who presents today for:    1. Encounter for Medicare annual wellness exam  See other note    2. Controlled type 2 diabetes mellitus with complication, without long-term current use of insulin (HCC)  Stable, continue current treatment. - MICROALBUMIN, UR, RAND W/ MICROALBUMIN/CREA RATIO  -  DIABETES FOOT EXAM    3. Skin lesion  Possible plantar wart vs molluscum contagiosum. Recommend warm Epsom salt soaks and topical salicylic acid. 4. Hyperlipidemia, unspecified hyperlipidemia type  Stable, continue current treatment. Medications Discontinued During This Encounter   Medication Reason    raloxifene (EVISTA) 60 mg tablet Not A Current Medication       Follow-up Disposition:  Return in about 6 months (around 1/7/2018), or if symptoms worsen or fail to improve, for DM follow up. Medication risks/benefits/costs/interactions/alternatives discussed with patient. Advised patient to call back or return to office if symptoms worsen/change/persist. If patient cannot reach us or should anything more severe/urgent arise he/she should proceed directly to the nearest emergency department. Discussed expected course/resolution/complications of diagnosis in detail with patient. Patient given a written after visit summary which includes his/her diagnoses, current medications and vitals. Patient expressed understanding with the diagnosis and plan.      Breanna Tubbs M.D.

## 2017-07-07 NOTE — PROGRESS NOTES
Dr. Nicolas Lazo referred LW, 1945, a 73 y.o. female for a Medicare Annual Wellness Visit (AWV). This is a Subsequent Medicare Annual Wellness Visit providing Personalized Prevention Plan Services (PPPS) (Performed 12 months after initial AWV and PPPS )    I have reviewed the patient's medical history in detail and updated the computerized patient record. History     Past Medical History:   Diagnosis Date    Breast cancer ([facility] [address] 2003    lumpectomy + radiation right breast    Diabetes mellitus, controlled ([facility] [address] 9/3/2015    Genital herpes 3/10/2010    GERD (gastroesophageal reflux disease)     HTN, goal below 140/90 3/8/2010    Hyperlipemia 11/26/2014    Osteopenia 3/10/2010      Past Surgical History:   Procedure Laterality Date    ENDOSCOPY, COLON, DIAGNOSTIC  2000    HX APPENDECTOMY      HX BREAST LUMPECTOMY      R breast - lumpectomy + radiation - on arimidex x 5 years - Dr. Katey Shane HX LITHOTRIPSY  2006    kidney stone    HX ORTHOPAEDIC  [id number]    bilat hips replaced - needs keflex prior to dental procedure    HX ORTHOPAEDIC      bilateral foot fusion of great hallux. hammer toe    HX TONSILLECTOMY       Current Outpatient Prescriptions   Medication Sig Dispense Refill    calcium-cholecalciferol, D3, (CALTRATE 600+D) tablet Take 1 Tab by mouth daily.  metFORMIN (GLUCOPHAGE) 500 mg tablet Take 1 tablet by mouth two  times daily with meals 180 Tab 1    chlorthalidone (HYGROTEN) 25 mg tablet Take 1 tablet by mouth  daily 90 Tab 1    valACYclovir (VALTREX) 500 mg tablet Take 1 tablet by mouth  daily 90 Tab 1    olmesartan (BENICAR) 20 mg tablet Take 1 tablet by mouth  daily 90 Tab 0    atorvastatin (LIPITOR) 40 mg tablet Take 1 tablet by mouth  daily 90 Tab 0    Lancets misc Per insurance, testing 2 x weekly Dx: Diabetes uncontrolled 250.02 1 Each 11    multivitamin (ONE A DAY) tablet Take 1 Tab by mouth daily.       OMEPRAZOLE MAGNESIUM (PRILOSEC OTC PO) Take 1 Tab by mouth daily.      naproxen sodium (ALEVE) 220 mg tablet Take 220 mg by mouth daily as needed. prn      glucose blood VI test strips (ACCU-CHEK SMARTVIEW TEST STRIP) strip Per insurance, testing 2 x weekly Dx: Diabetes uncontrolled 250.02 1 Each 11     Allergies   Allergen Reactions    Pcn [Penicillins] Itching     Lips itching    Femara [Letrozole] Other (comments)     htn     Family History   Problem Relation Age of Onset    Arthritis-osteo Mother     Stroke Father     Arthritis-osteo Maternal Grandmother      Social History   Substance Use Topics    Smoking status: Never Smoker    Smokeless tobacco: Never Used    Alcohol use Yes      Comment: red wine 7/wk     Patient Active Problem List   Diagnosis Code    HTN, goal below 140/90 I10    Osteopenia M85.80    Genital herpes A60.00    Hyperlipemia E78.5    Diabetes mellitus, controlled ([facility] [address] E11.9    Breast cancer ([facility] [address] C50.919    GERD (gastroesophageal reflux disease) K21.9    Malignant neoplasm of right female breast ([facility] [address] C50.911       Depression Risk Factor Screening:     PHQ over the last two weeks 7/7/2017   Little interest or pleasure in doing things Not at all   Feeling down, depressed or hopeless Not at all   Total Score PHQ 2 0     Alcohol Risk Factor Screening: On any occasion during the past 3 months, have you had more than 3 drinks containing alcohol? No    Do you average more than 7 drinks per week? No        Functional Ability and Level of Safety:     Hearing Loss   Not noted    Activities of Daily Living   Self-care. Requires assistance with: no ADLs    Fall Risk   Fall Risk Assessment, last 12 mths 7/7/2017   Able to walk? Yes   Fall in past 12 months? Yes   Fall with injury?  Yes   Number of falls in past 12 months 1   Fall Risk Score 2     Abuse Screen   Patient is not abused    Review of Systems   Not required    Physical Examination     Evaluation of Cognitive Function:  Mood/affect:  happy  Appearance: age appropriate and casually dressed  Family member/caregiver input: none present    No exam performed today, not required for medicare annual wellness visit. Patient Care Team:  Ryan Peñaloza MD as PCP - General (Family Practice)    Advice/Referrals/Counseling   Education and counseling provided:  Are appropriate based on today's review and evaluation  End-of-Life planning (with patient's consent)  Pneumococcal Vaccine  Influenza Vaccine  Screening Mammography  Colorectal cancer screening tests  Cardiovascular screening blood test  Bone mass measurement (DEXA)  Screening for glaucoma      Assessment/Plan       ICD-10-CM ICD-9-CM    1. Encounter for Medicare annual wellness exam Z00.00 V70.0    2. Controlled type 2 diabetes mellitus with complication, without long-term current use of insulin (HCC) E11.8 250.90 MICROALBUMIN, UR, RAND W/ MICROALBUMIN/CREA RATIO      HM DIABETES FOOT EXAM   3. Skin lesion L98.9 709.9    4. Hyperlipidemia, unspecified hyperlipidemia type E78.5 272.4    5. Routine general medical examination at a health care facility Z00.00 V70.0    6. Screening for alcoholism Z13.89 V79.1    7. Screening for depression Z13.89 V79.0 DEPRESSION SCREEN ANNUAL     8. Lab results and schedule of future lab studies reviewed with patient    9. Reviewed medications and side effects in detail. Patient did not bring in their medications to the visit. During the patient interview,  the following was noted:  Prilosec OTC:  Patient takes 1- tablet daily    10. Screenings (see patient instructions for chart):  Mammogram: Due, patient to get done at [facility]  Colonoscopy: Up to date, due for repeat in 6/2022  Glaucoma screening/Eye exam: Up to date, due for repeat in 12/2017  DEXA scan: Has diagnosis, and going to start zolendronic acid infusions. Repeat based on Dr. Cris Jordan recommendations  Lipid panel: Up to date, due for repeat in 2018     11.   Immunizations:  Pneumococcal:  Recommended that patient receive Prevnar-13  Influenza:  Recommended that patient receive here or at their pharmacy this fall  Zoster:  Recommended that patient receive at their pharmacy. Tdap:  Completed    12. Advanced Care Planning:  Patient currently has advanced directive on file asked her to bring it. Patient verbalized understanding of information presented. Answered all of the patient's questions. AVS handed and reviewed with patient.     Vero Carroll, PharmD, [place] [place]

## 2017-07-07 NOTE — PATIENT INSTRUCTIONS
Learning About Diabetes Food Guidelines  Your Care Instructions  Meal planning is important to manage diabetes. It helps keep your blood sugar at a target level (which you set with your doctor). You don't have to eat special foods. You can eat what your family eats, including sweets once in a while. But you do have to pay attention to how often you eat and how much you eat of certain foods. You may want to work with a dietitian or a certified diabetes educator (CDE) to help you plan meals and snacks. A dietitian or CDE can also help you lose weight if that is one of your goals. What should you know about eating carbs? Managing the amount of carbohydrate (carbs) you eat is an important part of healthy meals when you have diabetes. Carbohydrate is found in many foods. · Learn which foods have carbs. And learn the amounts of carbs in different foods. ¨ Bread, cereal, pasta, and rice have about 15 grams of carbs in a serving. A serving is 1 slice of bread (1 ounce), ½ cup of cooked cereal, or 1/3 cup of cooked pasta or rice. ¨ Fruits have 15 grams of carbs in a serving. A serving is 1 small fresh fruit, such as an apple or orange; ½ of a banana; ½ cup of cooked or canned fruit; ½ cup of fruit juice; 1 cup of melon or raspberries; or 2 tablespoons of dried fruit. ¨ Milk and no-sugar-added yogurt have 15 grams of carbs in a serving. A serving is 1 cup of milk or 2/3 cup of no-sugar-added yogurt. ¨ Starchy vegetables have 15 grams of carbs in a serving. A serving is ½ cup of mashed potatoes or sweet potato; 1 cup winter squash; ½ of a small baked potato; ½ cup of cooked beans; or ½ cup cooked corn or green peas. · Learn how much carbs to eat each day and at each meal. A dietitian or CDE can teach you how to keep track of the amount of carbs you eat. This is called carbohydrate counting. · If you are not sure how to count carbohydrate grams, use the Plate Method to plan meals.  It is a good, quick way to make sure that you have a balanced meal. It also helps you spread carbs throughout the day. ¨ Divide your plate by types of foods. Put non-starchy vegetables on half the plate, meat or other protein food on one-quarter of the plate, and a grain or starchy vegetable in the final quarter of the plate. To this you can add a small piece of fruit and 1 cup of milk or yogurt, depending on how many carbs you are supposed to eat at a meal.  · Try to eat about the same amount of carbs at each meal. Do not \"save up\" your daily allowance of carbs to eat at one meal.  · Proteins have very little or no carbs per serving. Examples of proteins are beef, chicken, turkey, fish, eggs, tofu, cheese, cottage cheese, and peanut butter. A serving size of meat is 3 ounces, which is about the size of a deck of cards. Examples of meat substitute serving sizes (equal to 1 ounce of meat) are 1/4 cup of cottage cheese, 1 egg, 1 tablespoon of peanut butter, and ½ cup of tofu. How can you eat out and still eat healthy? · Learn to estimate the serving sizes of foods that have carbohydrate. If you measure food at home, it will be easier to estimate the amount in a serving of restaurant food. · If the meal you order has too much carbohydrate (such as potatoes, corn, or baked beans), ask to have a low-carbohydrate food instead. Ask for a salad or green vegetables. · If you use insulin, check your blood sugar before and after eating out to help you plan how much to eat in the future. · If you eat more carbohydrate at a meal than you had planned, take a walk or do other exercise. This will help lower your blood sugar. What else should you know? · Limit saturated fat, such as the fat from meat and dairy products. This is a healthy choice because people who have diabetes are at higher risk of heart disease. So choose lean cuts of meat and nonfat or low-fat dairy products. Use olive or canola oil instead of butter or shortening when cooking.   · Don't skip meals. Your blood sugar may drop too low if you skip meals and take insulin or certain medicines for diabetes. · Check with your doctor before you drink alcohol. Alcohol can cause your blood sugar to drop too low. Alcohol can also cause a bad reaction if you take certain diabetes medicines. Follow-up care is a key part of your treatment and safety. Be sure to make and go to all appointments, and call your doctor if you are having problems. It's also a good idea to know your test results and keep a list of the medicines you take. Where can you learn more? Go to http://ivy-yoandy.info/. Enter J501 in the search box to learn more about \"Learning About Diabetes Food Guidelines. \"  Current as of: March 13, 2017  Content Version: 11.3  © 6875-7235 [employer]. Care instructions adapted under license by [employer] (which disclaims liability or warranty for this information). If you have questions about a medical condition or this instruction, always ask your healthcare professional. [place] [address] any warranty or liability for your use of this information. Medicare Part B Preventive Services Limitations Recommendation Scheduled   Bone Mass Measurement  (age 72 & older, biennial) Requires diagnosis related to osteoporosis or estrogen deficiency. Biennial benefit unless patient has history of long-term glucocorticoid tx or baseline is needed because initial test was by other method A DEXA scan is recommended after you turn 72years of age to determine your risk for osteoporosis Completed. Repeat based on Dr. Blaze Torres.    Cardiovascular Screening Blood Tests (every 5 years)  Total cholesterol, HDL, Triglycerides Order as a panel if possible Last: 7/5/17    Every Year Next: 7/2018   Colorectal Cancer Screening  -Fecal occult blood test (annual)  -Flexible sigmoidoscopy (5y)  -Screening colonoscopy (10y)  -Barium Enema  Last: 6/5/12    Recommended repeat in 10 years Next: 6/2022   Glaucoma Screening (no USPSTF recommendation) Diabetes mellitus, family history, , age 48 or over,  American, age 72 or over Last: 12/27/16    Every year Next: 12/2017   Seasonal Influenza Vaccination (annually)    Every Fall Please get one this Fall. Shingles Vaccination  A shingles vaccine is recommended once in a lifetime after age 61 Completed on 11/2/12   Pneumococcal Vaccination (once after 65)  Last:  Pneumovax: 12/14/11 and 9/8/14    Prevnar-13: Due for Prevnar-13   Screening Mammography (biennial age 54-69)? Annually (age 36 or over) Last: 1/8/16 Due     Please get a pneumonia vaccine (Prevnar-13) at your pharmacy. If you get it at the pharmacy, please have the pharmacist fax in documentation to the office.     Bring in a copy of your advanced medical directive at your next visit

## 2017-07-07 NOTE — MR AVS SNAPSHOT
Visit Information Date & Time Provider Department Dept. Phone Encounter #  
 7/7/2017  8:00 AM Maria Del Carmen Lam MD [facility] [phone] [id number] Follow-up Instructions Return in about 6 months (around 1/7/2018), or if symptoms worsen or fail to improve, for DM follow up. Upcoming Health Maintenance Date Due  
 MEDICARE YEARLY EXAM 4/30/2016 MICROALBUMIN Q1 7/8/2017 INFLUENZA AGE 9 TO ADULT 8/1/2017 EYE EXAM RETINAL OR DILATED Q1 12/27/2017 HEMOGLOBIN A1C Q6M 1/5/2018 BREAST CANCER SCRN MAMMOGRAM 1/8/2018 LIPID PANEL Q1 7/5/2018 FOOT EXAM Q1 7/7/2018 GLAUCOMA SCREENING Q2Y 12/27/2018 Bone Densitometry 6/1/2019 COLONOSCOPY 6/5/2022 DTaP/Tdap/Td series (2 - Td) 4/1/2024 Allergies as of 7/7/2017  Review Complete On: 7/7/2017 By: Jossie Tovar, PHARMD  
  
 Severity Noted Reaction Type Reactions Pcn [Penicillins] High 03/03/2010    Itching Lips itching Femara [Letrozole]  03/03/2010    Other (comments)  
 htn Current Immunizations  Reviewed on 9/18/2014 Name Date Influenza High Dose Vaccine PF 10/5/2016, 9/28/2015, 9/18/2014 Influenza Vaccine 8/25/2013 Influenza Vaccine Whole 9/1/2011 Pneumococcal Polysaccharide (PPSV-23) 9/18/2014 Pneumococcal Vaccine (Unspecified Type) 12/14/2011 Tdap 4/1/2014 Zoster 11/2/2012 Not reviewed this visit You Were Diagnosed With   
  
 Codes Comments Encounter for Medicare annual wellness exam    -  Primary ICD-10-CM: Z00.00 ICD-9-CM: V70.0 Controlled type 2 diabetes mellitus with complication, without long-term current use of insulin (HCC)     ICD-10-CM: E11.8 ICD-9-CM: 250.90 Skin lesion     ICD-10-CM: L98.9 ICD-9-CM: 709.9 Hyperlipidemia, unspecified hyperlipidemia type     ICD-10-CM: E78.5 ICD-9-CM: 272.4 Vitals BP Pulse Temp Resp Height(growth percentile) Weight(growth percentile) 124/80 (BP 1 Location: Left arm, BP Patient Position: Sitting) 88 98.1 °F (36.7 °C) (Oral) 18 5' 2\" (1.575 m) 179 lb (81.2 kg) SpO2 BMI OB Status Smoking Status 98% 32.74 kg/m2 Postmenopausal Never Smoker Vitals History BMI and BSA Data Body Mass Index Body Surface Area 32.74 kg/m 2 1.88 m 2 Preferred Pharmacy Pharmacy Name Phone Emily Figueredo [id number] [facility], [address] [place] [address] [phone] Your Updated Medication List  
  
   
This list is accurate as of: 7/7/17  8:54 AM.  Always use your most recent med list.  
  
  
  
  
 ALEVE 220 mg tablet Generic drug:  naproxen sodium Take 220 mg by mouth daily as needed. prn  
  
 atorvastatin 40 mg tablet Commonly known as:  LIPITOR Take 1 tablet by mouth  daily  
  
 calcium-cholecalciferol (D3) tablet Commonly known as:  CALTRATE 600+D Take 1 Tab by mouth daily. chlorthalidone 25 mg tablet Commonly known as:  Zhane Space Take 1 tablet by mouth  daily  
  
 glucose blood VI test strips strip Commonly known as:  [address] Per insurance, testing 2 x weekly Dx: Diabetes uncontrolled 250.02 Lancets Misc Per insurance, testing 2 x weekly Dx: Diabetes uncontrolled 250.02  
  
 metFORMIN 500 mg tablet Commonly known as:  GLUCOPHAGE Take 1 tablet by mouth two  times daily with meals  
  
 multivitamin tablet Commonly known as:  ONE A DAY Take 1 Tab by mouth daily. olmesartan 20 mg tablet Commonly known as:  [employer] Take 1 tablet by mouth  daily PRILOSEC OTC PO Take 1 Tab by mouth daily. valACYclovir 500 mg tablet Commonly known as:  VALTREX Take 1 tablet by mouth  daily We Performed the Following  DIABETES FOOT EXAM [7 Custom] MICROALBUMIN, UR, RAND W/ MICROALBUMIN/CREA RATIO [id number] CPT(R)] Follow-up Instructions  Return in about 6 months (around 1/7/2018), or if symptoms worsen or fail to improve, for DM follow up. Patient Instructions Learning About Diabetes Food Guidelines Your Care Instructions Meal planning is important to manage diabetes. It helps keep your blood sugar at a target level (which you set with your doctor). You don't have to eat special foods. You can eat what your family eats, including sweets once in a while. But you do have to pay attention to how often you eat and how much you eat of certain foods. You may want to work with a dietitian or a certified diabetes educator (CDE) to help you plan meals and snacks. A dietitian or CDE can also help you lose weight if that is one of your goals. What should you know about eating carbs? Managing the amount of carbohydrate (carbs) you eat is an important part of healthy meals when you have diabetes. Carbohydrate is found in many foods. · Learn which foods have carbs. And learn the amounts of carbs in different foods. ¨ Bread, cereal, pasta, and rice have about 15 grams of carbs in a serving. A serving is 1 slice of bread (1 ounce), ½ cup of cooked cereal, or 1/3 cup of cooked pasta or rice. ¨ Fruits have 15 grams of carbs in a serving. A serving is 1 small fresh fruit, such as an apple or orange; ½ of a banana; ½ cup of cooked or canned fruit; ½ cup of fruit juice; 1 cup of melon or raspberries; or 2 tablespoons of dried fruit. ¨ Milk and no-sugar-added yogurt have 15 grams of carbs in a serving. A serving is 1 cup of milk or 2/3 cup of no-sugar-added yogurt. ¨ Starchy vegetables have 15 grams of carbs in a serving. A serving is ½ cup of mashed potatoes or sweet potato; 1 cup winter squash; ½ of a small baked potato; ½ cup of cooked beans; or ½ cup cooked corn or green peas. · Learn how much carbs to eat each day and at each meal. A dietitian or CDE can teach you how to keep track of the amount of carbs you eat. This is called carbohydrate counting. · If you are not sure how to count carbohydrate grams, use the Plate Method to plan meals. It is a good, quick way to make sure that you have a balanced meal. It also helps you spread carbs throughout the day. ¨ Divide your plate by types of foods. Put non-starchy vegetables on half the plate, meat or other protein food on one-quarter of the plate, and a grain or starchy vegetable in the final quarter of the plate. To this you can add a small piece of fruit and 1 cup of milk or yogurt, depending on how many carbs you are supposed to eat at a meal. 
· Try to eat about the same amount of carbs at each meal. Do not \"save up\" your daily allowance of carbs to eat at one meal. 
· Proteins have very little or no carbs per serving. Examples of proteins are beef, chicken, turkey, fish, eggs, tofu, cheese, cottage cheese, and peanut butter. A serving size of meat is 3 ounces, which is about the size of a deck of cards. Examples of meat substitute serving sizes (equal to 1 ounce of meat) are 1/4 cup of cottage cheese, 1 egg, 1 tablespoon of peanut butter, and ½ cup of tofu. How can you eat out and still eat healthy? · Learn to estimate the serving sizes of foods that have carbohydrate. If you measure food at home, it will be easier to estimate the amount in a serving of restaurant food. · If the meal you order has too much carbohydrate (such as potatoes, corn, or baked beans), ask to have a low-carbohydrate food instead. Ask for a salad or green vegetables. · If you use insulin, check your blood sugar before and after eating out to help you plan how much to eat in the future. · If you eat more carbohydrate at a meal than you had planned, take a walk or do other exercise. This will help lower your blood sugar. What else should you know? · Limit saturated fat, such as the fat from meat and dairy products.  This is a healthy choice because people who have diabetes are at higher risk of heart disease. So choose lean cuts of meat and nonfat or low-fat dairy products. Use olive or canola oil instead of butter or shortening when cooking. · Don't skip meals. Your blood sugar may drop too low if you skip meals and take insulin or certain medicines for diabetes. · Check with your doctor before you drink alcohol. Alcohol can cause your blood sugar to drop too low. Alcohol can also cause a bad reaction if you take certain diabetes medicines. Follow-up care is a key part of your treatment and safety. Be sure to make and go to all appointments, and call your doctor if you are having problems. It's also a good idea to know your test results and keep a list of the medicines you take. Where can you learn more? Go to http://ivy-yoandy.info/. Enter I881 in the search box to learn more about \"Learning About Diabetes Food Guidelines. \" Current as of: March 13, 2017 Content Version: 11.3 © 1664-3548 [employer]. Care instructions adapted under license by [employer] (which disclaims liability or warranty for this information). If you have questions about a medical condition or this instruction, always ask your healthcare professional. [place] [address] any warranty or liability for your use of this information. Medicare Part B Preventive Services Limitations Recommendation Scheduled Bone Mass Measurement 
(age 72 & older, biennial) Requires diagnosis related to osteoporosis or estrogen deficiency. Biennial benefit unless patient has history of long-term glucocorticoid tx or baseline is needed because initial test was by other method A DEXA scan is recommended after you turn 72years of age to determine your risk for osteoporosis Completed. Repeat based on Dr. Yemi Chavez. Cardiovascular Screening Blood Tests (every 5 years) Total cholesterol, HDL, Triglycerides Order as a panel if possible Last: 7/5/17 Every Year Next: 7/2018 Colorectal Cancer Screening 
-Fecal occult blood test (annual) -Flexible sigmoidoscopy (5y) 
-Screening colonoscopy (10y) -Barium Enema  Last: 6/5/12 Recommended repeat in 10 years Next: 6/2022 Glaucoma Screening (no USPSTF recommendation) Diabetes mellitus, family history, , age 48 or over,  American, age 72 or over Last: 12/27/16 Every year Next: 12/2017 Seasonal Influenza Vaccination (annually) Every Fall Please get one this Fall. Shingles Vaccination  A shingles vaccine is recommended once in a lifetime after age 61 Completed on 11/2/12 Pneumococcal Vaccination (once after 65)  Last: 
Pneumovax: 12/14/11 and 9/8/14 Prevnar-13: Due for Prevnar-13 Screening Mammography (biennial age 54-69)? Annually (age 36 or over) Last: 1/8/16 Due Please get a pneumonia vaccine (Prevnar-13) at your pharmacy. If you get it at the pharmacy, please have the pharmacist fax in documentation to the office. Bring in a copy of your advanced medical directive at your next visit Introducing [facility]! Dear Dany Palumbo: 
Thank you for requesting a [employer] account. Our records indicate that you already have an active [employer] account. You can access your account anytime at [email or web address] Did you know that you can access your hospital and ER discharge instructions at any time in [employer]? You can also review all of your test results from your hospital stay or ER visit. Additional Information If you have questions, please visit the Frequently Asked Questions section of the [employer] website at [email or web address]/. Remember, [employer] is NOT to be used for urgent needs. For medical emergencies, dial 911. Now available from your iPhone and Android! Please provide this summary of care documentation to your next provider. Your primary care clinician is listed as Kristel Jimenez.  If you have any questions after today's visit, please call [phone].

## 2017-07-08 ENCOUNTER — PATIENT MESSAGE (OUTPATIENT)
Dept: FAMILY MEDICINE CLINIC | Age: 72
End: 2017-07-08

## 2017-07-08 DIAGNOSIS — M67.449 MUCOUS CYST OF FINGER: Primary | ICD-10-CM

## 2017-07-08 LAB
ALBUMIN/CREAT UR: <7.9 MG/G CREAT (ref 0–30)
CREAT UR-MCNC: 38 MG/DL
MICROALBUMIN UR-MCNC: <3 UG/ML

## 2017-07-11 RX ORDER — SODIUM CHLORIDE 9 MG/ML
25 INJECTION, SOLUTION INTRAVENOUS CONTINUOUS
Status: DISPENSED | OUTPATIENT
Start: 2017-07-12 | End: 2017-07-12

## 2017-07-11 RX ORDER — ACETAMINOPHEN 325 MG/1
650 TABLET ORAL ONCE
Status: COMPLETED | OUTPATIENT
Start: 2017-07-12 | End: 2017-07-12

## 2017-07-11 RX ORDER — ZOLEDRONIC ACID 5 MG/100ML
5 INJECTION, SOLUTION INTRAVENOUS ONCE
Status: COMPLETED | OUTPATIENT
Start: 2017-07-12 | End: 2017-07-12

## 2017-07-12 ENCOUNTER — HOSPITAL ENCOUNTER (OUTPATIENT)
Dept: INFUSION THERAPY | Age: 72
Discharge: HOME | End: 2017-07-12
Payer: MEDICARE

## 2017-07-12 VITALS
TEMPERATURE: 97.8 F | HEART RATE: 85 BPM | RESPIRATION RATE: 18 BRPM | BODY MASS INDEX: 33.13 KG/M2 | WEIGHT: 180 LBS | HEIGHT: 62 IN | DIASTOLIC BLOOD PRESSURE: 87 MMHG | SYSTOLIC BLOOD PRESSURE: 146 MMHG

## 2017-07-12 LAB
ALBUMIN SERPL BCP-MCNC: 3.5 G/DL (ref 3.5–5)
ANION GAP BLD CALC-SCNC: 10 MMOL/L (ref 5–15)
BUN SERPL-MCNC: 24 MG/DL (ref 6–20)
BUN/CREAT SERPL: 31 (ref 12–20)
CALCIUM SERPL-MCNC: 8.8 MG/DL (ref 8.5–10.1)
CHLORIDE SERPL-SCNC: 96 MMOL/L (ref 97–108)
CO2 SERPL-SCNC: 24 MMOL/L (ref 21–32)
CREAT SERPL-MCNC: 0.77 MG/DL (ref 0.55–1.02)
GLUCOSE SERPL-MCNC: 111 MG/DL (ref 65–100)
PHOSPHATE SERPL-MCNC: 3.3 MG/DL (ref 2.6–4.7)
POTASSIUM SERPL-SCNC: 4 MMOL/L (ref 3.5–5.1)
SODIUM SERPL-SCNC: 130 MMOL/L (ref 136–145)

## 2017-07-12 PROCEDURE — 96374 THER/PROPH/DIAG INJ IV PUSH: CPT

## 2017-07-12 PROCEDURE — 80069 RENAL FUNCTION PANEL: CPT | Performed by: NURSE PRACTITIONER

## 2017-07-12 PROCEDURE — 36415 COLL VENOUS BLD VENIPUNCTURE: CPT | Performed by: NURSE PRACTITIONER

## 2017-07-12 PROCEDURE — 74011250637 HC RX REV CODE- 250/637: Performed by: NURSE PRACTITIONER

## 2017-07-12 PROCEDURE — 74011250636 HC RX REV CODE- 250/636: Performed by: NURSE PRACTITIONER

## 2017-07-12 RX ORDER — SODIUM CHLORIDE 9 MG/ML
25 INJECTION, SOLUTION INTRAVENOUS AS NEEDED
Status: DISPENSED | OUTPATIENT
Start: 2017-07-12 | End: 2017-07-13

## 2017-07-12 RX ORDER — SODIUM CHLORIDE 0.9 % (FLUSH) 0.9 %
5-10 SYRINGE (ML) INJECTION AS NEEDED
Status: ACTIVE | OUTPATIENT
Start: 2017-07-12 | End: 2017-07-13

## 2017-07-12 RX ADMIN — SODIUM CHLORIDE 25 ML/HR: 900 INJECTION, SOLUTION INTRAVENOUS at 08:22

## 2017-07-12 RX ADMIN — ACETAMINOPHEN 650 MG: 325 TABLET ORAL at 09:13

## 2017-07-12 RX ADMIN — ZOLEDRONIC ACID 5 MG: 5 INJECTION, SOLUTION INTRAVENOUS at 09:13

## 2017-07-12 RX ADMIN — Medication 10 ML: at 08:21

## 2017-07-12 NOTE — PROGRESS NOTES
Problem: Knowledge Deficit  Goal: *Verbalizes understanding of procedures and medications  Outcome: Progressing Towards Goal  Reclast

## 2017-07-12 NOTE — PROGRESS NOTES
Outpatient Infusion Center Short Visit Progress Note    0805 Pt admit to [facility] for Reclast ambulatory in stable condition. Assessment completed. No new concerns voiced. Peripheral IV established with positive blood return, labs drawn and sent for processing. NS infusing KVO. Patient Vitals for the past 12 hrs:   Temp Pulse Resp BP   07/12/17 0808 97.8 °F (36.6 °C) 85 18 146/87       Medications:  NS flush  NS KVO  Reclast 5mg IV  Tylenol 650mg PO    1000 Pt tolerated treatment well. Pt monitored for 30 min following infusion. D/c home ambulatory in no distress.      Recent Results (from the past 12 hour(s))   RENAL FUNCTION PANEL    Collection Time: 07/12/17  8:12 AM   Result Value Ref Range    Sodium 130 (L) 136 - 145 mmol/L    Potassium 4.0 3.5 - 5.1 mmol/L    Chloride 96 (L) 97 - 108 mmol/L    CO2 24 21 - 32 mmol/L    Anion gap 10 5 - 15 mmol/L    Glucose 111 (H) 65 - 100 mg/dL    BUN 24 (H) 6 - 20 MG/DL    Creatinine 0.77 0.55 - 1.02 MG/DL    BUN/Creatinine ratio 31 (H) 12 - 20      GFR est AA >60 >60 ml/min/1.73m2    GFR est non-AA >60 >60 ml/min/1.73m2    Calcium 8.8 8.5 - 10.1 MG/DL    Phosphorus 3.3 2.6 - 4.7 MG/DL    Albumin 3.5 3.5 - 5.0 g/dL

## 2017-07-14 NOTE — TELEPHONE ENCOUNTER
From: Johny Mar  Sent: 7/14/2017 3:52 AM EDT  To: Kirsten Burr MD  Subject: RE: Update Medical Information    I was going to contact you about this. The larger one is starting to bother me -it gets hit all the time and hurts. Does not hurt on its own, but does when it is hit. Is there a hand surgeon in the [facility]? I can certainly find one, but some doctors require a referral. Thanks so much,  Brenda Wilson  ----- Message -----  From: Kirsten Burr MD  Sent: 7/10/2017 9:51 AM EDT  To: [facility] [place]  Subject: RE: Update Medical Information    Hi [facility],    Thanks for the feedback! If they ever start to bother you, please do let me know but if they are not bothersome, let's just keep an eye on it. Stay well,  Kirsten Burr M.D.      ----- Message -----   From: [facility]   Sent: 7/8/2017 7:44 PM EDT   To: Kirsten Burr MD  Subject: Update Medical Information    DR. Jimenez,    I have discovered what the things on my fingers are. Mucous cysts are small, fluid-filled sacs that form on the fingers. They are associated with osteoarthritis (OA) and usually develop in patients 48to 79years old. These cysts appear between the last joint of the finger and the bottom of the fingernail. Everything is better when it has a name.   Ac

## 2017-08-03 LAB — MAMMOGRAPHY, EXTERNAL: NORMAL

## 2017-12-26 ENCOUNTER — OFFICE VISIT (OUTPATIENT)
Dept: FAMILY MEDICINE CLINIC | Age: 72
End: 2017-12-26

## 2017-12-26 VITALS
DIASTOLIC BLOOD PRESSURE: 77 MMHG | OXYGEN SATURATION: 96 % | HEART RATE: 88 BPM | RESPIRATION RATE: 16 BRPM | TEMPERATURE: 98.6 F | HEIGHT: 62 IN | WEIGHT: 177 LBS | SYSTOLIC BLOOD PRESSURE: 120 MMHG | BODY MASS INDEX: 32.57 KG/M2

## 2017-12-26 DIAGNOSIS — J40 BRONCHITIS: Primary | ICD-10-CM

## 2017-12-26 DIAGNOSIS — R05.9 COUGH: ICD-10-CM

## 2017-12-26 RX ORDER — AZITHROMYCIN 250 MG/1
TABLET, FILM COATED ORAL
Qty: 6 TAB | Refills: 0 | Status: SHIPPED | OUTPATIENT
Start: 2017-12-26 | End: 2017-12-31

## 2017-12-26 RX ORDER — ALBUTEROL SULFATE 90 UG/1
2 AEROSOL, METERED RESPIRATORY (INHALATION)
Qty: 1 INHALER | Refills: 0 | Status: SHIPPED | OUTPATIENT
Start: 2017-12-26 | End: 2018-02-27 | Stop reason: SDUPTHER

## 2017-12-26 RX ORDER — METHYLPREDNISOLONE 4 MG/1
TABLET ORAL
Qty: 1 DOSE PACK | Refills: 0 | Status: SHIPPED | OUTPATIENT
Start: 2017-12-26 | End: 2018-02-06

## 2017-12-26 RX ORDER — CODEINE PHOSPHATE AND GUAIFENESIN 10; 100 MG/5ML; MG/5ML
5 SOLUTION ORAL
Qty: 120 ML | Refills: 0 | Status: SHIPPED | OUTPATIENT
Start: 2017-12-26 | End: 2018-02-06

## 2017-12-26 NOTE — PATIENT INSTRUCTIONS
Bronchitis: Care Instructions  Your Care Instructions    Bronchitis is inflammation of the bronchial tubes, which carry air to the lungs. The tubes swell and produce mucus, or phlegm. The mucus and inflamed bronchial tubes make you cough. You may have trouble breathing. Most cases of bronchitis are caused by viruses like those that cause colds. Antibiotics usually do not help and they may be harmful. Bronchitis usually develops rapidly and lasts about 2 to 3 weeks in otherwise healthy people. Follow-up care is a key part of your treatment and safety. Be sure to make and go to all appointments, and call your doctor if you are having problems. It's also a good idea to know your test results and keep a list of the medicines you take. How can you care for yourself at home? · Take all medicines exactly as prescribed. Call your doctor if you think you are having a problem with your medicine. · Get some extra rest.  · Take an over-the-counter pain medicine, such as acetaminophen (Tylenol), ibuprofen (Advil, Motrin), or naproxen (Aleve) to reduce fever and relieve body aches. Read and follow all instructions on the label. · Do not take two or more pain medicines at the same time unless the doctor told you to. Many pain medicines have acetaminophen, which is Tylenol. Too much acetaminophen (Tylenol) can be harmful. · Take an over-the-counter cough medicine that contains dextromethorphan to help quiet a dry, hacking cough so that you can sleep. Avoid cough medicines that have more than one active ingredient. Read and follow all instructions on the label. · Breathe moist air from a humidifier, hot shower, or sink filled with hot water. The heat and moisture will thin mucus so you can cough it out. · Do not smoke. Smoking can make bronchitis worse. If you need help quitting, talk to your doctor about stop-smoking programs and medicines. These can increase your chances of quitting for good.   When should you call for help? Call 911 anytime you think you may need emergency care. For example, call if:  ? · You have severe trouble breathing. ?Call your doctor now or seek immediate medical care if:  ? · You have new or worse trouble breathing. ? · You cough up dark brown or bloody mucus (sputum). ? · You have a new or higher fever. ? · You have a new rash. ? Watch closely for changes in your health, and be sure to contact your doctor if:  ? · You cough more deeply or more often, especially if you notice more mucus or a change in the color of your mucus. ? · You are not getting better as expected. Where can you learn more? Go to http://ivy-yoandy.info/. Enter H333 in the search box to learn more about \"Bronchitis: Care Instructions. \"  Current as of: May 12, 2017  Content Version: 11.4  © 8861-2891 [employer]. Care instructions adapted under license by [employer] (which disclaims liability or warranty for this information). If you have questions about a medical condition or this instruction, always ask your healthcare professional. [address] any warranty or liability for your use of this information.

## 2017-12-26 NOTE — PROGRESS NOTES
Chief Complaint   Patient presents with    Breathing Problem     wheezing- started 6 weeks ago    Cough     tried OTC mucinex- slightly effective    Nasal Congestion       1. Have you been to the ER, urgent care clinic since your last visit? Hospitalized since your last visit? No    2. Have you seen or consulted any other health care providers outside of the [address] [place] [address] since your last visit? Include any pap smears or colon screening.  No

## 2017-12-26 NOTE — PROGRESS NOTES
HISTORY OF PRESENT ILLNESS  Roel Villarreal is a 81 y.o. female. Blood pressure 120/77, pulse 88, temperature 98.6 °F (37 °C), temperature source Oral, resp. rate 16, height 5' 2\" (1.575 m), weight 177 lb (80.3 kg), SpO2 96 %. Body mass index is 32.37 kg/(m^2). Chief Complaint   Patient presents with    Breathing Problem     wheezing- started 6 weeks ago    Cough     tried OTC mucinex- slightly effective    Nasal Congestion        HPI  Roel Villarreal 81 y.o. female  presents to the office today for cough and breathing trouble. Respiratory problem: Pt notes that around 11/24/17 she started having congestion in her chest and nose. She notes that she coughs daily, but does not think it of concern. She notes that her congestion and cough have not gotten worse, but have not gone away from 11/24/17. She reports white-clear mucus with her productive cough. She states that several times a day she has a fever and sweats. Pt also reports that she has been having low energy daily. Pt states she has mild ear pain, shortness of breath, and wheezing. Discussed with pt that I think she has bronchitis and will give her Azithromycin 200mg, Medrol dose pack, Albuterol and Robitussin AC. Also discussed with her that if her symptoms do not improve in 3-4 days to follow up and we will do a chest XR to make sure she does not have walking pneumonia. Advised pt to take Airborne tablets daily during this time of year. Will give pt a nebulizer treatment before she leaves to help with her chest tightness. Current Outpatient Prescriptions   Medication Sig Dispense Refill    azithromycin (ZITHROMAX) 250 mg tablet Take 2 tablets today, then take 1 tablet daily 6 Tab 0    methylPREDNISolone (MEDROL DOSEPACK) 4 mg tablet Take as directed 1 Dose Pack 0    albuterol (PROVENTIL HFA, VENTOLIN HFA, PROAIR HFA) 90 mcg/actuation inhaler Take 2 Puffs by inhalation every six (6) hours as needed for Wheezing.  1 Inhaler 0    guaiFENesin-codeine (ROBITUSSIN AC) 100-10 mg/5 mL solution Take 5 mL by mouth three (3) times daily as needed for Cough. Max Daily Amount: 15 mL. Indications: COLD SYMPTOMS, Cough 120 mL 0    olmesartan (BENICAR) 20 mg tablet TAKE 1 TABLET BY MOUTH  DAILY 90 Tab 1    atorvastatin (LIPITOR) 40 mg tablet TAKE 1 TABLET BY MOUTH  DAILY 90 Tab 1    calcium-cholecalciferol, D3, (CALTRATE 600+D) tablet Take 1 Tab by mouth daily.  metFORMIN (GLUCOPHAGE) 500 mg tablet Take 1 tablet by mouth two  times daily with meals 180 Tab 1    chlorthalidone (HYGROTEN) 25 mg tablet Take 1 tablet by mouth  daily 90 Tab 1    valACYclovir (VALTREX) 500 mg tablet Take 1 tablet by mouth  daily 90 Tab 1    glucose blood VI test strips (ACCU-CHEK SMARTVIEW TEST STRIP) strip Per insurance, testing 2 x weekly Dx: Diabetes uncontrolled 250.02 1 Each 11    Lancets misc Per insurance, testing 2 x weekly Dx: Diabetes uncontrolled 250.02 1 Each 11    multivitamin (ONE A DAY) tablet Take 1 Tab by mouth daily.  OMEPRAZOLE MAGNESIUM (PRILOSEC OTC PO) Take 1 Tab by mouth daily.  naproxen sodium (ALEVE) 220 mg tablet Take 220 mg by mouth daily as needed.  prn       Allergies   Allergen Reactions    Pcn [Penicillins] Itching     Lips itching    Femara [Letrozole] Other (comments)     htn     Past Medical History:   Diagnosis Date    Breast cancer ([facility] [address] 2003    lumpectomy + radiation right breast    [facility]    Encounter for screening mammogram for breast cancer 08/03/2017    Dr. Clarissa Schaeffer - normal - repeat in one year    Genital herpes 3/10/2010    GERD (gastroesophageal reflux disease)     HTN, goal below 140/90 3/8/2010    Hyperlipemia 11/26/2014    Osteopenia 3/10/2010     Past Surgical History:   Procedure Laterality Date    ENDOSCOPY, COLON, DIAGNOSTIC  2000    HX APPENDECTOMY      HX BREAST LUMPECTOMY      R breast - lumpectomy + radiation - on arimidex x 5 years - Dr. Ardie Fleischer HX LITHOTRIPSY 2006    kidney stone    HX ORTHOPAEDIC  [id number]    bilat hips replaced - needs keflex prior to dental procedure    HX ORTHOPAEDIC      bilateral foot fusion of great hallux. hammer toe    HX TONSILLECTOMY       Family History   Problem Relation Age of Onset    Arthritis-osteo Mother     Stroke Father     Arthritis-osteo Maternal Grandmother      Social History   Substance Use Topics    Smoking status: Never Smoker    Smokeless tobacco: Never Used    Alcohol use Yes      Comment: red wine 7/wk        Review of Systems   Constitutional: Positive for malaise/fatigue. HENT: Positive for ear pain. Negative for sinus pain and sore throat. Eyes: Negative for blurred vision. Respiratory: Positive for cough, sputum production (clear/white), shortness of breath and wheezing. Cardiovascular: Negative for chest pain and leg swelling. Musculoskeletal: Negative. Neurological: Negative. Negative for dizziness and headaches. All other systems reviewed and are negative. Physical Exam   Constitutional: She is oriented to person, place, and time. She appears well-developed and well-nourished. No distress. HENT:   Head: Normocephalic and atraumatic. Right Ear: Hearing, tympanic membrane, external ear and ear canal normal.   Left Ear: Hearing, tympanic membrane, external ear and ear canal normal.   Nose: Nose normal.   Mouth/Throat: Posterior oropharyngeal erythema present. Neck: Carotid bruit is not present. Cardiovascular: Normal rate, regular rhythm, normal heart sounds and intact distal pulses. Exam reveals no gallop and no friction rub. No murmur heard. Pulmonary/Chest: Effort normal. No respiratory distress. She has decreased breath sounds (coarse breath sounds bilaterally). She has no wheezes. She has no rales. Musculoskeletal: She exhibits no edema. Lymphadenopathy:     She has no cervical adenopathy. Neurological: She is alert and oriented to person, place, and time.    Skin: She is not diaphoretic. Psychiatric: She has a normal mood and affect. Her behavior is normal. Judgment and thought content normal.   Nursing note and vitals reviewed. ASSESSMENT and PLAN  Diagnoses and all orders for this visit:    1. Bronchitis  -     azithromycin (ZITHROMAX) 250 mg tablet; Take 2 tablets today, then take 1 tablet daily  -     methylPREDNISolone (MEDROL DOSEPACK) 4 mg tablet; Take as directed  -     albuterol (PROVENTIL HFA, VENTOLIN HFA, PROAIR HFA) 90 mcg/actuation inhaler; Take 2 Puffs by inhalation every six (6) hours as needed for Wheezing.  - Discussed with pt that I think she has bronchitis and will give her Azithromycin 200mg, Medrol dose pack, Albuterol and Robitussin AC. - Also discussed with her that if her symptoms do not improve in 3-4 days to follow up and we will do a chest XR to make sure she does not have walking pneumonia. - Advised pt to take Airborne tablets daily during this time of year. - Will give pt a nebulizer treatment before she leaves to help with her chest tightness. 2. Cough  -     methylPREDNISolone (MEDROL DOSEPACK) 4 mg tablet; Take as directed  -     guaiFENesin-codeine (ROBITUSSIN AC) 100-10 mg/5 mL solution; Take 5 mL by mouth three (3) times daily as needed for Cough. Max Daily Amount: 15 mL. Indications: COLD SYMPTOMS, Cough  - See above. Follow-up Disposition:  Return if symptoms worsen or fail to improve, for cough. Medication risks/benefits/costs/interactions/alternatives discussed with patient. Advised patient to call back or return to office if symptoms worsen/change/persist.  If patient cannot reach us or should anything more severe/urgent arise he/she should proceed directly to the nearest emergency department. Discussed expected course/resolution/complications of diagnosis in detail with patient. Patient given a written after visit summary which includes her diagnoses, current medications and vitals.   Patient expressed understanding with the diagnosis and plan. Written by delmis Canchola, as dictated by Tyler Alvarado M.D.   I have reviewed and agree with the above note and have made corrections where appropriate, Dr. Zoe Geiger MD

## 2017-12-26 NOTE — MR AVS SNAPSHOT
Visit Information Date & Time Provider Department Dept. Phone Encounter #  
 12/26/2017  9:45 AM Zhou Resendiz  [facility] [address] [phone] [id number] Follow-up Instructions Return if symptoms worsen or fail to improve, for cough. Upcoming Health Maintenance Date Due  
 EYE EXAM RETINAL OR DILATED Q1 12/27/2017 HEMOGLOBIN A1C Q6M 1/5/2018 LIPID PANEL Q1 7/5/2018 FOOT EXAM Q1 7/7/2018 MICROALBUMIN Q1 7/7/2018 MEDICARE YEARLY EXAM 7/8/2018 GLAUCOMA SCREENING Q2Y 12/27/2018 Bone Densitometry 6/1/2019 COLONOSCOPY 6/5/2022 DTaP/Tdap/Td series (2 - Td) 4/1/2024 Allergies as of 12/26/2017  Review Complete On: 12/26/2017 By: Zhou Resendiz MD  
  
 Severity Noted Reaction Type Reactions Pcn [Penicillins] High 03/03/2010    Itching Lips itching Femara [Letrozole]  03/03/2010    Other (comments)  
 htn Current Immunizations  Reviewed on 7/12/2017 Name Date Influenza High Dose Vaccine PF 10/5/2016, 9/28/2015, 9/18/2014 Influenza Vaccine 8/25/2013 Influenza Vaccine Whole 9/1/2011 Pneumococcal Polysaccharide (PPSV-23) 9/18/2014 Tdap 4/1/2014 ZZZ-RETIRED (DO NOT USE) Pneumococcal Vaccine (Unspecified Type) 12/14/2011 Zoster 11/2/2012 Not reviewed this visit You Were Diagnosed With   
  
 Codes Comments Bronchitis    -  Primary ICD-10-CM: C11 ICD-9-CM: 812 Cough     ICD-10-CM: R05 ICD-9-CM: 917. 2 Vitals BP Pulse Temp Resp Height(growth percentile) Weight(growth percentile) 120/77 (BP 1 Location: Left arm, BP Patient Position: Sitting) 88 98.6 °F (37 °C) (Oral) 16 5' 2\" (1.575 m) 177 lb (80.3 kg) SpO2 BMI OB Status Smoking Status 96% 32.37 kg/m2 Postmenopausal Never Smoker Vitals History BMI and BSA Data Body Mass Index Body Surface Area  
 32.37 kg/m 2 1.87 m 2 Preferred Pharmacy Pharmacy Name Phone [facility], [address] [place] [address] [phone] Your Updated Medication List  
  
   
This list is accurate as of: 12/26/17 10:44 AM.  Always use your most recent med list.  
  
  
  
  
 albuterol 90 mcg/actuation inhaler Commonly known as:  PROVENTIL HFA, VENTOLIN HFA, PROAIR HFA Take 2 Puffs by inhalation every six (6) hours as needed for Wheezing. ALEVE 220 mg tablet Generic drug:  naproxen sodium Take 220 mg by mouth daily as needed. prn  
  
 atorvastatin 40 mg tablet Commonly known as:  LIPITOR  
TAKE 1 TABLET BY MOUTH  DAILY  
  
 azithromycin 250 mg tablet Commonly known as:  Paz Fine Take 2 tablets today, then take 1 tablet daily  
  
 calcium-cholecalciferol (D3) tablet Commonly known as:  CALTRATE 600+D Take 1 Tab by mouth daily. chlorthalidone 25 mg tablet Commonly known as:  Chales Chamber Take 1 tablet by mouth  daily  
  
 glucose blood VI test strips strip Commonly known as:  [address] Per insurance, testing 2 x weekly Dx: Diabetes uncontrolled 250.02  
  
 guaiFENesin-codeine 100-10 mg/5 mL solution Commonly known as:  ROBITUSSIN AC Take 5 mL by mouth three (3) times daily as needed for Cough. Max Daily Amount: 15 mL. Indications: COLD SYMPTOMS, Cough Lancets Misc Per insurance, testing 2 x weekly Dx: Diabetes uncontrolled 250.02  
  
 metFORMIN 500 mg tablet Commonly known as:  GLUCOPHAGE Take 1 tablet by mouth two  times daily with meals  
  
 methylPREDNISolone 4 mg tablet Commonly known as:  Juan Rubins Take as directed  
  
 multivitamin tablet Commonly known as:  ONE A DAY Take 1 Tab by mouth daily. olmesartan 20 mg tablet Commonly known as:  BENICAR  
TAKE 1 TABLET BY MOUTH  DAILY PRILOSEC OTC PO Take 1 Tab by mouth daily. valACYclovir 500 mg tablet Commonly known as:  VALTREX Take 1 tablet by mouth  daily Prescriptions Printed Refills  
 guaiFENesin-codeine (ROBITUSSIN AC) 100-10 mg/5 mL solution 0 Sig: Take 5 mL by mouth three (3) times daily as needed for Cough. Max Daily Amount: 15 mL. Indications: COLD SYMPTOMS, Cough Class: Print Route: Oral  
  
Prescriptions Sent to Pharmacy Refills  
 azithromycin (ZITHROMAX) 250 mg tablet 0 Sig: Take 2 tablets today, then take 1 tablet daily Class: Normal  
 Pharmacy: [address] Ph #: [phone]  
 methylPREDNISolone (MEDROL DOSEPACK) 4 mg tablet 0 Sig: Take as directed Class: Normal  
 Pharmacy: [address] Ph #: [phone]  
 albuterol (PROVENTIL HFA, VENTOLIN HFA, PROAIR HFA) 90 mcg/actuation inhaler 0 Sig: Take 2 Puffs by inhalation every six (6) hours as needed for Wheezing. Class: Normal  
 Pharmacy: [facility] [address] [place] [address], [address] [place] [address] Ph #: [phone] Route: Inhalation Follow-up Instructions Return if symptoms worsen or fail to improve, for cough. Patient Instructions Bronchitis: Care Instructions Your Care Instructions Bronchitis is inflammation of the bronchial tubes, which carry air to the lungs. The tubes swell and produce mucus, or phlegm. The mucus and inflamed bronchial tubes make you cough. You may have trouble breathing. Most cases of bronchitis are caused by viruses like those that cause colds. Antibiotics usually do not help and they may be harmful. Bronchitis usually develops rapidly and lasts about 2 to 3 weeks in otherwise healthy people. Follow-up care is a key part of your treatment and safety. Be sure to make and go to all appointments, and call your doctor if you are having problems. It's also a good idea to know your test results and keep a list of the medicines you take. How can you care for yourself at home? · Take all medicines exactly as prescribed.  Call your doctor if you think you are having a problem with your medicine. · Get some extra rest. 
· Take an over-the-counter pain medicine, such as acetaminophen (Tylenol), ibuprofen (Advil, Motrin), or naproxen (Aleve) to reduce fever and relieve body aches. Read and follow all instructions on the label. · Do not take two or more pain medicines at the same time unless the doctor told you to. Many pain medicines have acetaminophen, which is Tylenol. Too much acetaminophen (Tylenol) can be harmful. · Take an over-the-counter cough medicine that contains dextromethorphan to help quiet a dry, hacking cough so that you can sleep. Avoid cough medicines that have more than one active ingredient. Read and follow all instructions on the label. · Breathe moist air from a humidifier, hot shower, or sink filled with hot water. The heat and moisture will thin mucus so you can cough it out. · Do not smoke. Smoking can make bronchitis worse. If you need help quitting, talk to your doctor about stop-smoking programs and medicines. These can increase your chances of quitting for good. When should you call for help? Call 911 anytime you think you may need emergency care. For example, call if: 
? · You have severe trouble breathing. ?Call your doctor now or seek immediate medical care if: 
? · You have new or worse trouble breathing. ? · You cough up dark brown or bloody mucus (sputum). ? · You have a new or higher fever. ? · You have a new rash. ? Watch closely for changes in your health, and be sure to contact your doctor if: 
? · You cough more deeply or more often, especially if you notice more mucus or a change in the color of your mucus. ? · You are not getting better as expected. Where can you learn more? Go to http://ivy-yoandy.info/. Enter H333 in the search box to learn more about \"Bronchitis: Care Instructions. \" Current as of: May 12, 2017 Content Version: 11.4 © 4866-3786 [employer]wise[employer]. Care instructions adapted under license by [employer] (which disclaims liability or warranty for this information). If you have questions about a medical condition or this instruction, always ask your healthcare professional. [address] any warranty or liability for your use of this information. Introducing [facility]! Dear Gloria Owen: 
Thank you for requesting a [employer] account. Our records indicate that you already have an active [employer] account. You can access your account anytime at [email or web address] Did you know that you can access your hospital and ER discharge instructions at any time in [employer]? You can also review all of your test results from your hospital stay or ER visit. Additional Information If you have questions, please visit the Frequently Asked Questions section of the [employer] website at [email or web address] Remember, [employer] is NOT to be used for urgent needs. For medical emergencies, dial 911. Now available from your iPhone and Android! Please provide this summary of care documentation to your next provider. Your primary care clinician is listed as Kristel Jimenez. If you have any questions after today's visit, please call [phone].

## 2017-12-31 DIAGNOSIS — A60.00 GENITAL HERPES SIMPLEX, UNSPECIFIED SITE: ICD-10-CM

## 2017-12-31 DIAGNOSIS — I10 HTN, GOAL BELOW 140/90: ICD-10-CM

## 2017-12-31 DIAGNOSIS — E11.9 DIABETES TYPE 2, CONTROLLED (HCC): ICD-10-CM

## 2018-01-02 RX ORDER — RALOXIFENE HYDROCHLORIDE 60 MG/1
60 TABLET, FILM COATED ORAL DAILY
Qty: 90 TAB | Refills: 0 | Status: CANCELLED | OUTPATIENT
Start: 2018-01-02

## 2018-01-03 RX ORDER — METFORMIN HYDROCHLORIDE 500 MG/1
TABLET ORAL
Qty: 180 TAB | Refills: 1 | Status: SHIPPED | OUTPATIENT
Start: 2018-01-03 | End: 2018-08-07 | Stop reason: SDUPTHER

## 2018-01-03 RX ORDER — VALACYCLOVIR HYDROCHLORIDE 500 MG/1
TABLET, FILM COATED ORAL
Qty: 90 TAB | Refills: 1 | Status: SHIPPED | OUTPATIENT
Start: 2018-01-03 | End: 2018-08-07 | Stop reason: SDUPTHER

## 2018-01-03 RX ORDER — CHLORTHALIDONE 25 MG/1
TABLET ORAL
Qty: 90 TAB | Refills: 1 | Status: SHIPPED | OUTPATIENT
Start: 2018-01-03 | End: 2018-02-08

## 2018-01-03 NOTE — TELEPHONE ENCOUNTER
Call to patient.  verified. Informed patient meds have been refilled. Patient is overdue for office visit.     Patient scheduled 2018 08:00 AM with pcp

## 2018-02-06 ENCOUNTER — HOSPITAL ENCOUNTER (INPATIENT)
Age: 73
LOS: 2 days | Discharge: HOME | DRG: 641 | End: 2018-02-08
Attending: EMERGENCY MEDICINE | Admitting: HOSPITALIST
Payer: MEDICARE

## 2018-02-06 ENCOUNTER — APPOINTMENT (OUTPATIENT)
Dept: CT IMAGING | Age: 73
DRG: 641 | End: 2018-02-06
Attending: HOSPITALIST
Payer: MEDICARE

## 2018-02-06 ENCOUNTER — APPOINTMENT (OUTPATIENT)
Dept: GENERAL RADIOLOGY | Age: 73
DRG: 641 | End: 2018-02-06
Attending: EMERGENCY MEDICINE
Payer: MEDICARE

## 2018-02-06 DIAGNOSIS — E87.1 HYPONATREMIA: Primary | ICD-10-CM

## 2018-02-06 PROBLEM — R11.2 INTRACTABLE NAUSEA AND VOMITING: Status: ACTIVE | Noted: 2018-02-06

## 2018-02-06 LAB
ALBUMIN SERPL-MCNC: 3.3 G/DL (ref 3.5–5)
ALBUMIN/GLOB SERPL: 1 {RATIO} (ref 1.1–2.2)
ALP SERPL-CCNC: 91 U/L (ref 45–117)
ALT SERPL-CCNC: 23 U/L (ref 12–78)
ANION GAP SERPL CALC-SCNC: 12 MMOL/L (ref 5–15)
ANION GAP SERPL CALC-SCNC: 13 MMOL/L (ref 5–15)
APPEARANCE UR: CLEAR
AST SERPL-CCNC: 49 U/L (ref 15–37)
ATRIAL RATE: 71 BPM
BACTERIA URNS QL MICRO: NEGATIVE /HPF
BASOPHILS # BLD: 0 K/UL (ref 0–0.1)
BASOPHILS NFR BLD: 0 % (ref 0–1)
BILIRUB SERPL-MCNC: 0.5 MG/DL (ref 0.2–1)
BILIRUB UR QL: NEGATIVE
BUN SERPL-MCNC: 10 MG/DL (ref 6–20)
BUN SERPL-MCNC: 7 MG/DL (ref 6–20)
BUN SERPL-MCNC: 8 MG/DL (ref 6–20)
BUN SERPL-MCNC: 8 MG/DL (ref 6–20)
BUN/CREAT SERPL: 17 (ref 12–20)
BUN/CREAT SERPL: 18 (ref 12–20)
BUN/CREAT SERPL: 21 (ref 12–20)
BUN/CREAT SERPL: 22 (ref 12–20)
CALCIUM SERPL-MCNC: 7 MG/DL (ref 8.5–10.1)
CALCIUM SERPL-MCNC: 7 MG/DL (ref 8.5–10.1)
CALCIUM SERPL-MCNC: 7.3 MG/DL (ref 8.5–10.1)
CALCIUM SERPL-MCNC: 7.9 MG/DL (ref 8.5–10.1)
CALCULATED P AXIS, ECG09: 54 DEGREES
CALCULATED R AXIS, ECG10: 44 DEGREES
CALCULATED T AXIS, ECG11: 43 DEGREES
CHLORIDE SERPL-SCNC: 74 MMOL/L (ref 97–108)
CHLORIDE SERPL-SCNC: 78 MMOL/L (ref 97–108)
CHLORIDE SERPL-SCNC: 78 MMOL/L (ref 97–108)
CHLORIDE SERPL-SCNC: 81 MMOL/L (ref 97–108)
CO2 SERPL-SCNC: 19 MMOL/L (ref 21–32)
CO2 SERPL-SCNC: 22 MMOL/L (ref 21–32)
CO2 SERPL-SCNC: 23 MMOL/L (ref 21–32)
CO2 SERPL-SCNC: 24 MMOL/L (ref 21–32)
COLOR UR: ABNORMAL
CORTIS SERPL-MCNC: 44.3 UG/DL
CORTIS SERPL-MCNC: 69.2 UG/DL
CREAT SERPL-MCNC: 0.36 MG/DL (ref 0.55–1.02)
CREAT SERPL-MCNC: 0.39 MG/DL (ref 0.55–1.02)
CREAT SERPL-MCNC: 0.41 MG/DL (ref 0.55–1.02)
CREAT SERPL-MCNC: 0.55 MG/DL (ref 0.55–1.02)
CREAT UR-MCNC: 62.2 MG/DL
DIAGNOSIS, 93000: NORMAL
DIFFERENTIAL METHOD BLD: ABNORMAL
EOSINOPHIL # BLD: 0 K/UL (ref 0–0.4)
EOSINOPHIL NFR BLD: 0 % (ref 0–7)
EPITH CASTS URNS QL MICRO: ABNORMAL /LPF
ERYTHROCYTE [DISTWIDTH] IN BLOOD BY AUTOMATED COUNT: 12.4 % (ref 11.5–14.5)
GLOBULIN SER CALC-MCNC: 3.4 G/DL (ref 2–4)
GLUCOSE SERPL-MCNC: 115 MG/DL (ref 65–100)
GLUCOSE SERPL-MCNC: 130 MG/DL (ref 65–100)
GLUCOSE SERPL-MCNC: 136 MG/DL (ref 65–100)
GLUCOSE SERPL-MCNC: 144 MG/DL (ref 65–100)
GLUCOSE UR STRIP.AUTO-MCNC: 100 MG/DL
HCT VFR BLD AUTO: 33.4 % (ref 35–47)
HGB BLD-MCNC: 12.4 G/DL (ref 11.5–16)
HGB UR QL STRIP: NEGATIVE
HYALINE CASTS URNS QL MICRO: ABNORMAL /LPF (ref 0–5)
IMM GRANULOCYTES # BLD: 0 K/UL (ref 0–0.04)
IMM GRANULOCYTES NFR BLD AUTO: 0 % (ref 0–0.5)
KETONES UR QL STRIP.AUTO: ABNORMAL MG/DL
LEUKOCYTE ESTERASE UR QL STRIP.AUTO: ABNORMAL
LIPASE SERPL-CCNC: 147 U/L (ref 73–393)
LYMPHOCYTES # BLD: 0.8 K/UL (ref 0.8–3.5)
LYMPHOCYTES NFR BLD: 23 % (ref 12–49)
MAGNESIUM SERPL-MCNC: 1.4 MG/DL (ref 1.6–2.4)
MCH RBC QN AUTO: 30.3 PG (ref 26–34)
MCHC RBC AUTO-ENTMCNC: 37.1 G/DL (ref 30–36.5)
MCV RBC AUTO: 81.7 FL (ref 80–99)
MONOCYTES # BLD: 0.5 K/UL (ref 0–1)
MONOCYTES NFR BLD: 14 % (ref 5–13)
NEUTS SEG # BLD: 2 K/UL (ref 1.8–8)
NEUTS SEG NFR BLD: 63 % (ref 32–75)
NITRITE UR QL STRIP.AUTO: NEGATIVE
NRBC # BLD: 0 K/UL (ref 0–0.01)
NRBC BLD-RTO: 0 PER 100 WBC
OSMOLALITY SERPL: 236 MOSM/KG H2O
OSMOLALITY UR: 479 MOSM/KG H2O
P-R INTERVAL, ECG05: 170 MS
PH UR STRIP: 8 [PH] (ref 5–8)
PHOSPHATE SERPL-MCNC: 1.7 MG/DL (ref 2.6–4.7)
PLATELET # BLD AUTO: 232 K/UL (ref 150–400)
PMV BLD AUTO: 9.7 FL (ref 8.9–12.9)
POTASSIUM SERPL-SCNC: 2.9 MMOL/L (ref 3.5–5.1)
POTASSIUM SERPL-SCNC: 3.1 MMOL/L (ref 3.5–5.1)
POTASSIUM SERPL-SCNC: 3.2 MMOL/L (ref 3.5–5.1)
POTASSIUM SERPL-SCNC: 3.2 MMOL/L (ref 3.5–5.1)
PROT SERPL-MCNC: 6.7 G/DL (ref 6.4–8.2)
PROT UR STRIP-MCNC: ABNORMAL MG/DL
Q-T INTERVAL, ECG07: 424 MS
QRS DURATION, ECG06: 80 MS
QTC CALCULATION (BEZET), ECG08: 460 MS
RBC # BLD AUTO: 4.09 M/UL (ref 3.8–5.2)
RBC #/AREA URNS HPF: ABNORMAL /HPF (ref 0–5)
RBC MORPH BLD: ABNORMAL
SODIUM SERPL-SCNC: 110 MMOL/L (ref 136–145)
SODIUM SERPL-SCNC: 112 MMOL/L (ref 136–145)
SODIUM SERPL-SCNC: 112 MMOL/L (ref 136–145)
SODIUM SERPL-SCNC: 114 MMOL/L (ref 136–145)
SP GR UR REFRACTOMETRY: 1.02 (ref 1–1.03)
TROPONIN I SERPL-MCNC: <0.04 NG/ML
TSH SERPL DL<=0.05 MIU/L-ACNC: 0.98 UIU/ML (ref 0.36–3.74)
UR CULT HOLD, URHOLD: NORMAL
URATE SERPL-MCNC: 2.4 MG/DL (ref 2.6–6)
UROBILINOGEN UR QL STRIP.AUTO: 0.2 EU/DL (ref 0.2–1)
VENTRICULAR RATE, ECG03: 71 BPM
WBC # BLD AUTO: 3.3 K/UL (ref 3.6–11)
WBC URNS QL MICRO: ABNORMAL /HPF (ref 0–4)

## 2018-02-06 PROCEDURE — 80048 BASIC METABOLIC PNL TOTAL CA: CPT | Performed by: HOSPITALIST

## 2018-02-06 PROCEDURE — 82570 ASSAY OF URINE CREATININE: CPT | Performed by: HOSPITALIST

## 2018-02-06 PROCEDURE — 99284 EMERGENCY DEPT VISIT MOD MDM: CPT

## 2018-02-06 PROCEDURE — 71250 CT THORAX DX C-: CPT

## 2018-02-06 PROCEDURE — 94762 N-INVAS EAR/PLS OXIMTRY CONT: CPT

## 2018-02-06 PROCEDURE — 84100 ASSAY OF PHOSPHORUS: CPT | Performed by: INTERNAL MEDICINE

## 2018-02-06 PROCEDURE — 83735 ASSAY OF MAGNESIUM: CPT | Performed by: INTERNAL MEDICINE

## 2018-02-06 PROCEDURE — 84550 ASSAY OF BLOOD/URIC ACID: CPT | Performed by: HOSPITALIST

## 2018-02-06 PROCEDURE — 74011000258 HC RX REV CODE- 258: Performed by: INTERNAL MEDICINE

## 2018-02-06 PROCEDURE — 74011000250 HC RX REV CODE- 250: Performed by: HOSPITALIST

## 2018-02-06 PROCEDURE — 83690 ASSAY OF LIPASE: CPT | Performed by: EMERGENCY MEDICINE

## 2018-02-06 PROCEDURE — 74011250636 HC RX REV CODE- 250/636: Performed by: INTERNAL MEDICINE

## 2018-02-06 PROCEDURE — 80048 BASIC METABOLIC PNL TOTAL CA: CPT | Performed by: INTERNAL MEDICINE

## 2018-02-06 PROCEDURE — 74011250636 HC RX REV CODE- 250/636: Performed by: EMERGENCY MEDICINE

## 2018-02-06 PROCEDURE — 65660000000 HC RM CCU STEPDOWN

## 2018-02-06 PROCEDURE — 96360 HYDRATION IV INFUSION INIT: CPT

## 2018-02-06 PROCEDURE — 85025 COMPLETE CBC W/AUTO DIFF WBC: CPT | Performed by: EMERGENCY MEDICINE

## 2018-02-06 PROCEDURE — 74011000250 HC RX REV CODE- 250: Performed by: INTERNAL MEDICINE

## 2018-02-06 PROCEDURE — 74011250636 HC RX REV CODE- 250/636: Performed by: HOSPITALIST

## 2018-02-06 PROCEDURE — 87086 URINE CULTURE/COLONY COUNT: CPT | Performed by: INTERNAL MEDICINE

## 2018-02-06 PROCEDURE — 84443 ASSAY THYROID STIM HORMONE: CPT | Performed by: HOSPITALIST

## 2018-02-06 PROCEDURE — 82533 TOTAL CORTISOL: CPT | Performed by: HOSPITALIST

## 2018-02-06 PROCEDURE — 93005 ELECTROCARDIOGRAM TRACING: CPT

## 2018-02-06 PROCEDURE — 84484 ASSAY OF TROPONIN QUANT: CPT | Performed by: EMERGENCY MEDICINE

## 2018-02-06 PROCEDURE — 81001 URINALYSIS AUTO W/SCOPE: CPT | Performed by: EMERGENCY MEDICINE

## 2018-02-06 PROCEDURE — 82533 TOTAL CORTISOL: CPT | Performed by: INTERNAL MEDICINE

## 2018-02-06 PROCEDURE — 71045 X-RAY EXAM CHEST 1 VIEW: CPT

## 2018-02-06 PROCEDURE — 36415 COLL VENOUS BLD VENIPUNCTURE: CPT | Performed by: EMERGENCY MEDICINE

## 2018-02-06 PROCEDURE — 83935 ASSAY OF URINE OSMOLALITY: CPT | Performed by: EMERGENCY MEDICINE

## 2018-02-06 PROCEDURE — 83930 ASSAY OF BLOOD OSMOLALITY: CPT | Performed by: HOSPITALIST

## 2018-02-06 PROCEDURE — 80053 COMPREHEN METABOLIC PANEL: CPT | Performed by: EMERGENCY MEDICINE

## 2018-02-06 RX ORDER — SODIUM CHLORIDE AND POTASSIUM CHLORIDE .9; .15 G/100ML; G/100ML
SOLUTION INTRAVENOUS CONTINUOUS
Status: DISCONTINUED | OUTPATIENT
Start: 2018-02-06 | End: 2018-02-06

## 2018-02-06 RX ORDER — 3% SODIUM CHLORIDE 3 G/100ML
40 INJECTION, SOLUTION INTRAVENOUS CONTINUOUS
Status: DISCONTINUED | OUTPATIENT
Start: 2018-02-06 | End: 2018-02-07

## 2018-02-06 RX ORDER — ONDANSETRON 2 MG/ML
4 INJECTION INTRAMUSCULAR; INTRAVENOUS
Status: DISCONTINUED | OUTPATIENT
Start: 2018-02-06 | End: 2018-02-06

## 2018-02-06 RX ORDER — SODIUM CHLORIDE 0.9 % (FLUSH) 0.9 %
5-10 SYRINGE (ML) INJECTION EVERY 8 HOURS
Status: DISCONTINUED | OUTPATIENT
Start: 2018-02-06 | End: 2018-02-08 | Stop reason: HOSPADM

## 2018-02-06 RX ORDER — ALBUTEROL SULFATE 90 UG/1
2 AEROSOL, METERED RESPIRATORY (INHALATION)
Status: DISCONTINUED | OUTPATIENT
Start: 2018-02-06 | End: 2018-02-06 | Stop reason: CLARIF

## 2018-02-06 RX ORDER — PROCHLORPERAZINE EDISYLATE 5 MG/ML
5 INJECTION INTRAMUSCULAR; INTRAVENOUS
Status: DISCONTINUED | OUTPATIENT
Start: 2018-02-06 | End: 2018-02-06 | Stop reason: SDUPTHER

## 2018-02-06 RX ORDER — ENOXAPARIN SODIUM 100 MG/ML
40 INJECTION SUBCUTANEOUS EVERY 24 HOURS
Status: DISCONTINUED | OUTPATIENT
Start: 2018-02-06 | End: 2018-02-06

## 2018-02-06 RX ORDER — LANOLIN ALCOHOL/MO/W.PET/CERES
400 CREAM (GRAM) TOPICAL 2 TIMES DAILY
Status: DISCONTINUED | OUTPATIENT
Start: 2018-02-06 | End: 2018-02-08 | Stop reason: HOSPADM

## 2018-02-06 RX ORDER — ALBUTEROL SULFATE 0.83 MG/ML
2.5 SOLUTION RESPIRATORY (INHALATION)
Status: DISCONTINUED | OUTPATIENT
Start: 2018-02-06 | End: 2018-02-08 | Stop reason: HOSPADM

## 2018-02-06 RX ORDER — GUAIFENESIN 100 MG/5ML
100 SOLUTION ORAL
Status: DISCONTINUED | OUTPATIENT
Start: 2018-02-06 | End: 2018-02-08 | Stop reason: HOSPADM

## 2018-02-06 RX ORDER — SODIUM CHLORIDE 0.9 % (FLUSH) 0.9 %
5-10 SYRINGE (ML) INJECTION AS NEEDED
Status: DISCONTINUED | OUTPATIENT
Start: 2018-02-06 | End: 2018-02-08 | Stop reason: HOSPADM

## 2018-02-06 RX ORDER — ENOXAPARIN SODIUM 100 MG/ML
40 INJECTION SUBCUTANEOUS EVERY 24 HOURS
Status: DISCONTINUED | OUTPATIENT
Start: 2018-02-06 | End: 2018-02-08 | Stop reason: HOSPADM

## 2018-02-06 RX ORDER — ONDANSETRON 2 MG/ML
4 INJECTION INTRAMUSCULAR; INTRAVENOUS
Status: DISCONTINUED | OUTPATIENT
Start: 2018-02-06 | End: 2018-02-08 | Stop reason: HOSPADM

## 2018-02-06 RX ORDER — FERROUS SULFATE, DRIED 160(50) MG
1 TABLET, EXTENDED RELEASE ORAL
Status: DISCONTINUED | OUTPATIENT
Start: 2018-02-07 | End: 2018-02-08 | Stop reason: HOSPADM

## 2018-02-06 RX ORDER — SODIUM CHLORIDE 9 MG/ML
50 INJECTION, SOLUTION INTRAVENOUS CONTINUOUS
Status: DISCONTINUED | OUTPATIENT
Start: 2018-02-06 | End: 2018-02-06

## 2018-02-06 RX ORDER — SODIUM CHLORIDE 9 MG/ML
100 INJECTION, SOLUTION INTRAVENOUS CONTINUOUS
Status: DISCONTINUED | OUTPATIENT
Start: 2018-02-06 | End: 2018-02-06

## 2018-02-06 RX ORDER — ACETAMINOPHEN 325 MG/1
650 TABLET ORAL
Status: DISCONTINUED | OUTPATIENT
Start: 2018-02-06 | End: 2018-02-08 | Stop reason: HOSPADM

## 2018-02-06 RX ORDER — MORPHINE SULFATE 2 MG/ML
2 INJECTION, SOLUTION INTRAMUSCULAR; INTRAVENOUS
Status: DISCONTINUED | OUTPATIENT
Start: 2018-02-06 | End: 2018-02-08 | Stop reason: HOSPADM

## 2018-02-06 RX ADMIN — SODIUM CHLORIDE 50 ML/HR: 900 INJECTION, SOLUTION INTRAVENOUS at 11:55

## 2018-02-06 RX ADMIN — SODIUM CHLORIDE 5 MG: 9 INJECTION INTRAMUSCULAR; INTRAVENOUS; SUBCUTANEOUS at 21:10

## 2018-02-06 RX ADMIN — SODIUM CHLORIDE 1000 ML: 900 INJECTION, SOLUTION INTRAVENOUS at 07:31

## 2018-02-06 RX ADMIN — ENOXAPARIN SODIUM 40 MG: 40 INJECTION SUBCUTANEOUS at 10:54

## 2018-02-06 RX ADMIN — SODIUM CHLORIDE 5 MG: 9 INJECTION INTRAMUSCULAR; INTRAVENOUS; SUBCUTANEOUS at 15:37

## 2018-02-06 RX ADMIN — ONDANSETRON 4 MG: 2 INJECTION INTRAMUSCULAR; INTRAVENOUS at 10:55

## 2018-02-06 RX ADMIN — Medication 10 ML: at 21:10

## 2018-02-06 RX ADMIN — SODIUM CHLORIDE 100 ML/HR: 900 INJECTION, SOLUTION INTRAVENOUS at 09:22

## 2018-02-06 RX ADMIN — Medication 10 ML: at 15:39

## 2018-02-06 RX ADMIN — ONDANSETRON 4 MG: 2 INJECTION INTRAMUSCULAR; INTRAVENOUS at 20:07

## 2018-02-06 RX ADMIN — SODIUM CHLORIDE 40 ML/HR: 3 INJECTION, SOLUTION INTRAVENOUS at 18:27

## 2018-02-06 RX ADMIN — MORPHINE SULFATE 2 MG: 2 INJECTION, SOLUTION INTRAMUSCULAR; INTRAVENOUS at 16:32

## 2018-02-06 RX ADMIN — MORPHINE SULFATE 2 MG: 2 INJECTION, SOLUTION INTRAMUSCULAR; INTRAVENOUS at 20:37

## 2018-02-06 RX ADMIN — POTASSIUM PHOSPHATE, MONOBASIC AND POTASSIUM PHOSPHATE, DIBASIC: 224; 236 INJECTION, SOLUTION, CONCENTRATE INTRAVENOUS at 17:09

## 2018-02-06 RX ADMIN — MORPHINE SULFATE 2 MG: 2 INJECTION, SOLUTION INTRAMUSCULAR; INTRAVENOUS at 11:07

## 2018-02-06 RX ADMIN — Medication 10 ML: at 10:56

## 2018-02-06 NOTE — CONSULTS
[address]  CONSULTATION    Name:Matt RAVI.  MR#: [id number]  : 1945  ACCOUNT #: [de-identified]   DATE OF SERVICE: 2018    REASON FOR CONSULTATION:  Hyponatremia. Thanks for the consult. HISTORY OF PRESENT ILLNESS:  We had the pleasure of seeing this patient. She was a very pleasant lady. She came to the ER because she had nausea, vomiting for 24 hours and she mentioned that she has been having the same complaint for more than a week. She works at [employer] and she said [employer] the kids have the flu\" and she was worried about having the flu last week. She had some URI symptoms that resolved now. Another issue with back pain but was most likely and furthermore now is decreased p.o. intake and being very nauseous, a bit of unsteady gait. She got a liter of IV fluid on admission and now she is on normal saline 100 mL an hour. Her sodium is 110 at 7:18 a.m. not repeated with a low potassium. Part of her outpatient meds include that she is on chlorthalidone. She has been on it for a long while, she said, and she did not take it the last two days. Other meds include ARB and she is on daily NSAIDs for her back pain. PAST MEDICAL HISTORY:  1. Breast cancer treated in  with radiation and lumpectomy and supposedly very active with her mammograms screen. 2.  GERD. 3.  Hypertension. 4.  Hyperlipidemia. 5.  Chronic hyponatremia based on the records we have. 6.  Multiple endoscopies. 7.  Orthopedic surgery. MEDICATIONS:  As inpatient include Lovenox still on the normal side. Medication as outpatient as per HPI. REVIEW OF SYSTEMS:  With the HPI, otherwise negative. ALLERGIES:  PENICILLIN AND FEMARA. SOCIAL HISTORY:  Reviewed. FAMILY HISTORY:  Reviewed. PHYSICAL EXAMINATION:   GENERAL:  Minimal distress. VITAL SIGNS:  Blood pressure 139/77, satting at 98%. NECK:  JVD is negative. No edema. ABDOMEN:  Soft. Clear lungs.     NEUROLOGIC: Alert, oriented to time and place. LABORATORY DATA:  As follows: At 7:18 a.m., sodium 110, potassium 3.2, BUN 10, creatinine 0.5, calcium 7.9. Sodium was 130 in July. Uric acid is 2.4, calcium 7.9, albumin 3.3. Urine chemistry not sent. UA specific gravity is 1.016 with positive sugar, trace protein, higher pH, 5-10 WBC and 0-5 RBC with trace ketones. is pending. ASSESSMENT:  1. Hyponatremia. Initially, we worry about hypovolemic hyponatremia in patients on thiazide diuretics though we will send a workup for that. Status post plus, plus IV fluid normal saline already received in the ER. To note, the patient had a history of breast cancer, has been well followed. We will need to rule out other causes of hyponatremia. The issue will be facing with thiazide to induce hyponatremia, worry about overcorrection so we will monitor that very closely. 2.  History of breast cancer. 3.  Diabetes mellitus. 4.  Upper respiratory infection symptoms, better. 5.  Heavy nonsteroidal anti-inflammatory drug intake. RECOMMENDATIONS:  Recommendation as follows:  1. Will recheck sodium now. We need to make sure that sodium is at least improving by a few points in the next few hours. We will need to avoid any overcorrection more than 6-8 points a day, especially in the setting of thiazide-induced hyponatremia where people can overcorrect. 2.  Serial monitoring of the sodium level. 3.  Will strongly advise her to follow up with her PCP and then follow up to rule out any recurrence of her breast cancer. 4.  We will check urine studies. 5.    be checked. 6.  Discontinue normal saline for now until we get the repeat labs. 7.  We will follow. Discussed in length with her and her . MD Michelle Hermosillo / Adalberto  D: 02/06/2018 10:43     T: 02/06/2018 11:26  JOB #: [id number]  CC: Indio Sheehan MD

## 2018-02-06 NOTE — ED TRIAGE NOTES
Triage note: Patient reports 5/10 generalized body aches, cough beginning last Tuesday, followed by vomiting on Thursday, subsided on Friday and reports generalized weakness, poor appetite and vomiting beginning again yesterday. Denies diarrhea.

## 2018-02-06 NOTE — ED PROVIDER NOTES
HPI Comments: 'started tues/ Wednesday with coughing/ aches/ 'have felt like hell'; then Friday started vomiting every 30 minutes/ then sat / sun it was better/ then started again last night and now I cant keep anything down'; pt denies HA, vison changes, diff swallowing, CP, SOB, Abd pain, D/Cons or other current systemic complaints  Pt adds 'whole body aches/ subj fevers/ chills';     Patient is a 81 y.o. female presenting with general illness, vomiting, and cough. The history is provided by the patient and the spouse. Generalized Body Aches   This is a new problem. The current episode started more than 2 days ago. Associated symptoms include chest pain and abdominal pain. Vomiting    Associated symptoms include chills, a fever, abdominal pain, myalgias and cough. Cough   Associated symptoms include chest pain, chills, myalgias and vomiting.         Past Medical History:   Diagnosis Date    Breast cancer [facility]) 2003    lumpectomy + radiation right breast    Diabetes mellitus, controlled ([facility] [address] 9/3/2015    Encounter for screening mammogram for breast cancer 08/03/2017    Dr. Keren Villegas - normal - repeat in one year    Genital herpes 3/10/2010    GERD (gastroesophageal reflux disease)     HTN, goal below 140/90 3/8/2010    Hyperlipemia 11/26/2014    Osteopenia 3/10/2010       Past Surgical History:   Procedure Laterality Date    ENDOSCOPY, COLON, DIAGNOSTIC  2000    HX APPENDECTOMY      HX BREAST LUMPECTOMY      R breast - lumpectomy + radiation - on arimidex x 5 years - Dr. Mary Vallejo HX LITHOTRIPSY  2006    kidney stone    HX ORTHOPAEDIC  [id number]    bilat hips replaced - needs keflex prior to dental procedure    HX ORTHOPAEDIC      bilateral foot fusion of great hallux. hammer toe    HX TONSILLECTOMY           Family History:   Problem Relation Age of Onset    Arthritis-osteo Mother     Stroke Father     Arthritis-osteo Maternal Grandmother        Social History     Social History    Marital status:      Spouse name: N/A    Number of children: N/A    Years of education: N/A     Occupational History    Not on file. Social History Main Topics    Smoking status: Never Smoker    Smokeless tobacco: Never Used    Alcohol use Yes      Comment: red wine 7/wk    Drug use: No    Sexual activity: Yes     Partners: Male     Birth control/ protection: Condom     Other Topics Concern     Service No    Blood Transfusions No    Caffeine Concern No    Occupational Exposure No    Hobby Hazards No    Sleep Concern No    Stress Concern No    Weight Concern Yes     weight gain     Special Diet No    Back Care No    Exercise Yes     walks up stairs     Bike Helmet No    Seat Belt Yes    Self-Exams Yes     Social History Narrative         ALLERGIES: Pcn [penicillins] and Femara [letrozole]    Review of Systems   Constitutional: Positive for chills and fever. HENT: Negative for trouble swallowing and voice change. Eyes: Negative for photophobia. Respiratory: Positive for cough. Cardiovascular: Positive for chest pain. Gastrointestinal: Positive for abdominal pain and vomiting. Genitourinary: Negative for dysuria. Musculoskeletal: Positive for myalgias. Skin: Negative for rash. Neurological: Positive for weakness. Negative for dizziness. All other systems reviewed and are negative. Vitals:    02/06/18 0701   BP: 128/75   Pulse: 73   Resp: 16   Temp: 99.2 °F (37.3 °C)   SpO2: 96%            Physical Exam   Constitutional: She is oriented to person, place, and time. She appears well-developed and well-nourished. No distress. Uncomfortable appearing, AxOx4, speaking in complete sentences, GCS = 15     HENT:   Head: Normocephalic and atraumatic. Nose: Nose normal.   Mouth/Throat: Oropharynx is clear and moist.   Eyes: Conjunctivae and EOM are normal. Pupils are equal, round, and reactive to light. Right eye exhibits no discharge.  Left eye exhibits no discharge. No scleral icterus. Neck: Normal range of motion. Neck supple. No JVD present. No tracheal deviation present. Cardiovascular: Normal rate, regular rhythm and normal heart sounds. Exam reveals no gallop and no friction rub. No murmur heard. Pulmonary/Chest: Effort normal and breath sounds normal. No respiratory distress. She has no wheezes. She has no rales. She exhibits no tenderness. Abdominal: Soft. Bowel sounds are normal. There is no tenderness. There is no rebound and no guarding. nttp       Genitourinary: No vaginal discharge found. Genitourinary Comments: Pt denies urinary/ vaginal complaints   Musculoskeletal: Normal range of motion. She exhibits no edema, tenderness or deformity. Neurological: She is alert and oriented to person, place, and time. She has normal reflexes. No cranial nerve deficit. She exhibits normal muscle tone. Coordination normal.   pt has motor/ CV/ Sensation grossly intact to all extremities, R = L in strength;   Skin: Skin is warm and dry. No rash noted. No erythema. No pallor. Psychiatric: She has a normal mood and affect. Her behavior is normal. Thought content normal.   Nursing note and vitals reviewed. MDM  Number of Diagnoses or Management Options  Hyponatremia:   Risk of Complications, Morbidity, and/or Mortality  Presenting problems: high  Diagnostic procedures: moderate  Management options: moderate    Critical Care  Total time providing critical care: 30-74 minutes (45min)    Patient Progress  Patient progress: stable        ED Course       Procedures      Chief Complaint   Patient presents with    Generalized Body Aches    Vomiting    Cough       7:11 AM  The patients presenting problems have been discussed, and they are in agreement with the care plan formulated and outlined with them. I have encouraged them to ask questions as they arise throughout their visit.     MEDICATIONS GIVEN:  Medications   sodium chloride 0.9 % bolus infusion 1,000 mL (not administered)       LABS REVIEWED:  Labs Reviewed   URINE CULTURE HOLD SAMPLE   SAMPLES BEING HELD   TROPONIN I   URINALYSIS W/MICROSCOPIC   METABOLIC PANEL, COMPREHENSIVE   CBC WITH AUTOMATED DIFF   LIPASE       RADIOLOGY RESULTS:  The following have been ordered and reviewed:  _____________________________________________________________________  _____________________________________________________________________    EKG interpretation: (Preliminary)  Rhythm: normal sinus rhythm; and regular . Rate (approx.): 70; Axis: normal; P wave: normal; QRS interval: normal ; ST/T wave: normal; Negative acute significant segmental elevations/ unchanged compared to study dated 03/17/2010    PROCEDURES:        CONSULTATIONS:       PROGRESS NOTES:      DIAGNOSIS:    1. Hyponatremia        PLAN:  1- low NA - will have evaluated for admission/ Pt/  agrees w/ plans;       ED COURSE: The patients hospital course has been uncomplicated. CONSULT NOTE:  9:14 AM Cass Goodson MD spoke with Dr. Alonzo Oakley, Consult for Hospitalist.  Discussed available diagnostic tests and clinical findings. He is in agreement with care plans as outlined. Dr. Alonzo Oakley will evaluate and admit the patient.

## 2018-02-06 NOTE — IP AVS SNAPSHOT
[address] 
[phone] Patient: Reza Garcia 
MRN: [id number] [facility]:89/5/9849 A check cristo indicates which time of day the medication should be taken. My Medications START taking these medications Instructions Each Dose to Equal  
 Morning Noon Evening Bedtime  
 ondansetron 4 mg disintegrating tablet Commonly known as:  ZOFRAN ODT Your last dose was: Your next dose is: Take 1 Tab by mouth every eight (8) hours as needed for Nausea. 4 mg CONTINUE taking these medications Instructions Each Dose to Equal  
 Morning Noon Evening Bedtime  
 albuterol 90 mcg/actuation inhaler Commonly known as:  PROVENTIL HFA, VENTOLIN HFA, PROAIR HFA Your last dose was: Your next dose is: Take 2 Puffs by inhalation every six (6) hours as needed for Wheezing. 2 Puff  
    
   
   
   
  
 atorvastatin 40 mg tablet Commonly known as:  LIPITOR Your last dose was: Your next dose is: TAKE 1 TABLET BY MOUTH  DAILY  
     
   
   
   
  
 calcium-cholecalciferol (D3) tablet Commonly known as:  CALTRATE 600+D Your last dose was: Your next dose is: Take 1 Tab by mouth daily. 1 Tab  
    
   
   
   
  
 metFORMIN 500 mg tablet Commonly known as:  GLUCOPHAGE Your last dose was: Your next dose is: TAKE 1 TABLET BY MOUTH TWO  TIMES DAILY WITH MEALS  
     
   
   
   
  
 multivitamin tablet Commonly known as:  ONE A DAY Your last dose was: Your next dose is: Take 1 Tab by mouth daily. 1 Tab  
    
   
   
   
  
 olmesartan 20 mg tablet Commonly known as:  [employer] Your last dose was: Your next dose is: TAKE 1 TABLET BY MOUTH  DAILY PRILOSEC OTC PO Your last dose was: Your next dose is: Take 1 Tab by mouth daily. 1 Tab  
    
   
   
   
  
 valACYclovir 500 mg tablet Commonly known as:  VALTREX Your last dose was: Your next dose is: TAKE 1 TABLET BY MOUTH  DAILY  
     
   
   
   
  
  
STOP taking these medications ALEVE 220 mg tablet Generic drug:  naproxen sodium  
   
  
 chlorthalidone 25 mg tablet Commonly known as:  Rosie Denson Where to Get Your Medications Information on where to get these meds will be given to you by the nurse or doctor. ! Ask your nurse or doctor about these medications  
  ondansetron 4 mg disintegrating tablet

## 2018-02-06 NOTE — PROGRESS NOTES
Problem: Fluid Volume - Risk of, Imbalanced  Goal: *Balanced intake and output  Outcome: Progressing Towards Goal  Pt reports no vomiting and reduced nausea at this time. Lab levels remain below perimeters, MD aware, fluid orders given. Will continue to monitor.

## 2018-02-06 NOTE — PROGRESS NOTES
her repeat sodium is 112,not better,pos nausea,higher Uosm;will give 3 % saline for 4 hours tonight with serial sodium checking;discussed with patient,RN

## 2018-02-06 NOTE — CONSULTS
Seen and examined  Thanks for the consult  A/P:  Hyponatremia,suspect hypovolemic/thiazide induced for now;to note she also has a Hx of breast CA treated in 2003  S.p IVF NS given  N-V  Back pain  URI symptoms better    Hold NS for now  Avoid thiazide diuretics  Recheck stat sodium,like to see improvement of 2-3 pts of sodium in the next few hours;at risk of over correction  Serial sodium monitoring  Check Uosm. ..   Discussed with her and her

## 2018-02-06 NOTE — PROGRESS NOTES
Admission Medication Reconciliation:    Information obtained from: Patient / Patient's medical record    Significant PMH/Disease States:   Past Medical History:   Diagnosis Date    Breast cancer ([facility] [address] 2003    lumpectomy + radiation right breast    Diabetes mellitus, controlled ([facility] [address] 9/3/2015    Encounter for screening mammogram for breast cancer 08/03/2017    Dr. Glorya Hashimoto - normal - repeat in one year    Genital herpes 3/10/2010    GERD (gastroesophageal reflux disease)     HTN, goal below 140/90 3/8/2010    Hyperlipemia 11/26/2014    Osteopenia 3/10/2010       Chief Complaint for this Admission:    Chief Complaint   Patient presents with    Generalized Body Aches    Vomiting    Cough         Allergies:  Pcn [penicillins] and Femara [letrozole]    Prior to Admission Medications:   Prior to Admission Medications   Prescriptions Last Dose Informant Patient Reported? Taking? OMEPRAZOLE MAGNESIUM (PRILOSEC OTC PO) 2/2/2018  Yes No   Sig: Take 1 Tab by mouth daily. albuterol (PROVENTIL HFA, VENTOLIN HFA, PROAIR HFA) 90 mcg/actuation inhaler 2/2/2018  No No   Sig: Take 2 Puffs by inhalation every six (6) hours as needed for Wheezing. atorvastatin (LIPITOR) 40 mg tablet 2/2/2018  No No   Sig: TAKE 1 TABLET BY MOUTH  DAILY   calcium-cholecalciferol, D3, (CALTRATE 600+D) tablet 2/2/2018  Yes No   Sig: Take 1 Tab by mouth daily. chlorthalidone (HYGROTEN) 25 mg tablet 2/2/2018  No No   Sig: TAKE 1 TABLET BY MOUTH  DAILY   metFORMIN (GLUCOPHAGE) 500 mg tablet 2/2/2018  No No   Sig: TAKE 1 TABLET BY MOUTH TWO  TIMES DAILY WITH MEALS   multivitamin (ONE A DAY) tablet 2/2/2018  Yes No   Sig: Take 1 Tab by mouth daily. naproxen sodium (ALEVE) 220 mg tablet 2/2/2018  Yes No   Sig: Take 220 mg by mouth daily as needed.  prn   olmesartan (BENICAR) 20 mg tablet 2/2/2018  No No   Sig: TAKE 1 TABLET BY MOUTH  DAILY   valACYclovir (VALTREX) 500 mg tablet 2/2/2018  No No   Sig: TAKE 1 TABLET BY MOUTH  DAILY Facility-Administered Medications: None         Comments/Recommendations: Medication reconciliation interview completed with the patient. Reviewed the current medication list with the patient and removed non-medication items. Additionally, removed the Robitussin AC and the Medrol Dosepack as these have been completed. Of note: patient states she has not taken any of her medications since about 4 days ago. Thank you for allowing me to participate in the care of this patient. If there are any further questions, please contact the pharmacy at  or the medication reconciliation pharmacist at . Rach Farnsworth, Pharm. D., BCPS

## 2018-02-06 NOTE — ROUTINE PROCESS
TRANSFER - OUT REPORT:    Verbal report given to Edilia RN (name) on Jenni   being transferred to Room 406 (unit) for routine progression of care       Report consisted of patients Situation, Background, Assessment and   Recommendations(SBAR). Information from the following report(s) SBAR, Kardex, ED Summary, MAR, Recent Results and Cardiac Rhythm NSR was reviewed with the receiving nurse. Lines:   Peripheral IV 02/06/18 Left Antecubital (Active)   Site Assessment Clean, dry, & intact 2/6/2018  7:25 AM   Phlebitis Assessment 0 2/6/2018  7:25 AM   Infiltration Assessment 0 2/6/2018  7:25 AM   Dressing Status Clean, dry, & intact 2/6/2018  7:25 AM   Dressing Type Transparent 2/6/2018  7:25 AM   Hub Color/Line Status Pink 2/6/2018  7:25 AM   Action Taken Catheter retaped 2/6/2018  7:25 AM   Alcohol Cap Used Yes 2/6/2018  7:25 AM        Opportunity for questions and clarification was provided.       Patient transported with:   [employer]

## 2018-02-06 NOTE — ED NOTES
Bedside and Verbal shift change report given to [facility], RN and Marce Irving RN (oncoming nurse) by Evens Matta RN (offgoing nurse). Report included the following information SBAR, ED Summary, MAR and Recent Results.

## 2018-02-06 NOTE — IP AVS SNAPSHOT
27[address] [facility] [address] [place] [address] 
[phone] Patient: Darlene Gardner 
MRN: [id number] WEY:90/8/9332 About your hospitalization You were admitted on:  February 6, 2018 You last received care in the:  [facility] [address] You were discharged on:  February 8, 2018 Why you were hospitalized Your primary diagnosis was:  Hyponatremia Your diagnoses also included:  Intractable Nausea And Vomiting Follow-up Information Follow up With Details Comments Contact Info Miriam Colindres MD On 2/15/2018 [facility] follow up PCP appointment on Thursday, 2/15/18 @ 1:45 p.m. with rKis Velarde NP. Patient needs to arrive 15 minutes early. [address] 
[phone] Your Scheduled Appointments Thursday February 15, 2018  1:45 PM EST  
ROUTINE CARE with Shantal Hameed  [facility] ([facility]) [address]  
[phone] Discharge Orders None A check cristo indicates which time of day the medication should be taken. My Medications START taking these medications Instructions Each Dose to Equal  
 Morning Noon Evening Bedtime  
 ondansetron 4 mg disintegrating tablet Commonly known as:  ZOFRAN ODT Your last dose was: Your next dose is: Take 1 Tab by mouth every eight (8) hours as needed for Nausea. 4 mg CONTINUE taking these medications Instructions Each Dose to Equal  
 Morning Noon Evening Bedtime  
 albuterol 90 mcg/actuation inhaler Commonly known as:  PROVENTIL HFA, VENTOLIN HFA, PROAIR HFA Your last dose was: Your next dose is: Take 2 Puffs by inhalation every six (6) hours as needed for Wheezing. 2 Puff  
    
   
   
   
  
 atorvastatin 40 mg tablet Commonly known as:  LIPITOR Your last dose was: Your next dose is: TAKE 1 TABLET BY MOUTH  DAILY  
     
   
   
   
  
 calcium-cholecalciferol (D3) tablet Commonly known as:  CALTRATE 600+D Your last dose was: Your next dose is: Take 1 Tab by mouth daily. 1 Tab  
    
   
   
   
  
 metFORMIN 500 mg tablet Commonly known as:  GLUCOPHAGE Your last dose was: Your next dose is: TAKE 1 TABLET BY MOUTH TWO  TIMES DAILY WITH MEALS  
     
   
   
   
  
 multivitamin tablet Commonly known as:  ONE A DAY Your last dose was: Your next dose is: Take 1 Tab by mouth daily. 1 Tab  
    
   
   
   
  
 olmesartan 20 mg tablet Commonly known as:  [employer] Your last dose was: Your next dose is: TAKE 1 TABLET BY MOUTH  DAILY PRILOSEC OTC PO Your last dose was: Your next dose is: Take 1 Tab by mouth daily. 1 Tab  
    
   
   
   
  
 valACYclovir 500 mg tablet Commonly known as:  VALTREX Your last dose was: Your next dose is: TAKE 1 TABLET BY MOUTH  DAILY  
     
   
   
   
  
  
STOP taking these medications ALEVE 220 mg tablet Generic drug:  naproxen sodium  
   
  
 chlorthalidone 25 mg tablet Commonly known as:  Papa Lockett Where to Get Your Medications Information on where to get these meds will be given to you by the nurse or doctor. ! Ask your nurse or doctor about these medications  
  ondansetron 4 mg disintegrating tablet Discharge Instructions Discharge Instructions PATIENT ID: Jose Elias Durbin 
MRN: [id number] YOB: 1945 DATE OF ADMISSION: 2/6/2018  6:52 AM   
DATE OF DISCHARGE: 2/8/2018 PRIMARY CARE PROVIDER: Mello Arzola MD  
 
ATTENDING PHYSICIAN: Broderick Carlson MD 
DISCHARGING PROVIDER: Nadira Denson MD   
 To contact this individual call [phone] and ask the  to page. If unavailable ask to be transferred the Adult Hospitalist Department. DISCHARGE DIAGNOSES n/v with hyponatremia 2n2 vol losses CONSULTATIONS: IP CONSULT TO HOSPITALIST 
IP CONSULT TO NEPHROLOGY PROCEDURES/SURGERIES: * No surgery found * PENDING TEST RESULTS:  
At the time of discharge the following test results are still pending: na 
 
FOLLOW UP APPOINTMENTS:  
Follow-up Information Follow up With Details Comments Contact Info Kulwant Ramachandran MD In 1 week [facility],  [address] 
[phone] ADDITIONAL CARE RECOMMENDATIONS:  Note avoid diuretic, avoid NSAIDs as in naproxen , ibuprofen, other,  
 
Use tylenol as needed. DIET: Resume previous diet ACTIVITY: Activity as tolerated WOUND CARE: na 
 
EQUIPMENT needed: na 
 
 
  
 SNF/Inpatient Rehab/LTAC  
x Independent/assisted living Hospice Other:  
 
  
 
 
Signed:  
Laverta Romberg, MD 
2/8/2018 10:40 AM 
 
[employer] Transitions of Care [address] offers a voluntary care coordination program to provide high quality service and care to [facility] fee-for-service beneficiaries. Keiry Machado was designed to help you enhance your health and well-being through the following services: ? Transitions of Care  support for individuals who are transitioning from one care setting to another (example: Hospital to home). ? Chronic and Complex Care Coordination  support for individuals and caregivers of those with serious or chronic illnesses or with more than one chronic (ongoing) condition and those who take a number of different medications. If you meet specific medical criteria, a [address] [place] [address] Rd may call you directly to coordinate your care with your primary care physician and your other care providers. For questions about the [facility] programs, please, contact your physicians office. For general questions or additional information about Accountable Care Organizations: 
Please visit www.medicare.gov/acos. html or call 1-800-MEDICARE (5-270.616.2054) TTY users should call 6-439.244.9117. [employer] Announcement We are excited to announce that we are making your provider's discharge notes available to you in [employer]. You will see these notes when they are completed and signed by the physician that discharged you from your recent hospital stay. If you have any questions or concerns about any information you see in "[employer]"hart, please call the Health Information Department where you were seen or reach out to your Primary Care Provider for more information about your plan of care. Introducing [facility]! Dear Bailey Tabares: 
Thank you for requesting a [employer] account. Our records indicate that you already have an active [employer] account. You can access your account anytime at [email or web address] Did you know that you can access your hospital and ER discharge instructions at any time in [employer]? You can also review all of your test results from your hospital stay or ER visit. Additional Information If you have questions, please visit the Frequently Asked Questions section of the [employer] website at [email or web address] [email or web address]/Localize [email or web address] Remember, [employer] is NOT to be used for urgent needs. For medical emergencies, dial 911. Now available from your iPhone and Android! Providers Seen During Your Hospitalization Provider Specialty Primary office phone Pascale Butts MD Emergency Medicine [phone] Nick Ma MD Internal Medicine [phone] Your Primary Care Physician (PCP) Primary Care Physician Office Phone Office Fax Sterling Saucedo [phone] You are allergic to the following Allergen Reactions Pcn (Penicillins) Itching Lips itching Femara (Letrozole) Other (comments)  
 htn Recent Documentation Height Weight BMI OB Status Smoking Status 1.57 m 75.9 kg 30.79 kg/m2 Postmenopausal Never Smoker Emergency Contacts Name Discharge Info Relation Home Work Mobile Yuko Solorzano DISCHARGE CAREGIVER [3] Friend [5]   [phone] YovaniPascual iqbal DISCHARGE CAREGIVER [3] Spouse [3]   [phone] Patient Belongings The following personal items are in your possession at time of discharge: 
  Dental Appliances: None  Visual Aid: Glasses, At bedside      Home Medications: None   Jewelry: Ring  Clothing: None    Other Valuables: Cell Phone Please provide this summary of care documentation to your next provider. Signatures-by signing, you are acknowledging that this After Visit Summary has been reviewed with you and you have received a copy. Patient Signature:  ____________________________________________________________  Date:  ____________________________________________________________  
  
Morales Olsen    
    
 Provider Signature:  ____________________________________________________________ Date:  ____________________________________________________________

## 2018-02-06 NOTE — PROGRESS NOTES
Patient seen again ;her repeat sodium is 112(up by 2 pts in 3 hours);issues with back pain  NS to be stopped;also ordered KPhos IV now  If repeat sodium not better,will give 3 % saline,also need to avoid overcorrection  Discussed with RN

## 2018-02-07 LAB
ANION GAP SERPL CALC-SCNC: 10 MMOL/L (ref 5–15)
ANION GAP SERPL CALC-SCNC: 14 MMOL/L (ref 5–15)
ANION GAP SERPL CALC-SCNC: 9 MMOL/L (ref 5–15)
BACTERIA SPEC CULT: NORMAL
BASOPHILS # BLD: 0 K/UL (ref 0–0.1)
BASOPHILS NFR BLD: 0 % (ref 0–1)
BUN SERPL-MCNC: 6 MG/DL (ref 6–20)
BUN/CREAT SERPL: 11 (ref 12–20)
BUN/CREAT SERPL: 13 (ref 12–20)
BUN/CREAT SERPL: 17 (ref 12–20)
CALCIUM SERPL-MCNC: 7 MG/DL (ref 8.5–10.1)
CALCIUM SERPL-MCNC: 7.9 MG/DL (ref 8.5–10.1)
CALCIUM SERPL-MCNC: 8.4 MG/DL (ref 8.5–10.1)
CC UR VC: NORMAL
CHLORIDE SERPL-SCNC: 79 MMOL/L (ref 97–108)
CHLORIDE SERPL-SCNC: 82 MMOL/L (ref 97–108)
CHLORIDE SERPL-SCNC: 87 MMOL/L (ref 97–108)
CO2 SERPL-SCNC: 21 MMOL/L (ref 21–32)
CO2 SERPL-SCNC: 25 MMOL/L (ref 21–32)
CO2 SERPL-SCNC: 26 MMOL/L (ref 21–32)
CREAT SERPL-MCNC: 0.36 MG/DL (ref 0.55–1.02)
CREAT SERPL-MCNC: 0.47 MG/DL (ref 0.55–1.02)
CREAT SERPL-MCNC: 0.53 MG/DL (ref 0.55–1.02)
DIFFERENTIAL METHOD BLD: ABNORMAL
EOSINOPHIL # BLD: 0 K/UL (ref 0–0.4)
EOSINOPHIL NFR BLD: 0 % (ref 0–7)
ERYTHROCYTE [DISTWIDTH] IN BLOOD BY AUTOMATED COUNT: 11.9 % (ref 11.5–14.5)
GLUCOSE SERPL-MCNC: 123 MG/DL (ref 65–100)
GLUCOSE SERPL-MCNC: 125 MG/DL (ref 65–100)
GLUCOSE SERPL-MCNC: 132 MG/DL (ref 65–100)
HCT VFR BLD AUTO: 29.6 % (ref 35–47)
HGB BLD-MCNC: 11 G/DL (ref 11.5–16)
IMM GRANULOCYTES # BLD: 0 K/UL (ref 0–0.04)
IMM GRANULOCYTES NFR BLD AUTO: 0 % (ref 0–0.5)
LYMPHOCYTES # BLD: 0.8 K/UL (ref 0.8–3.5)
LYMPHOCYTES NFR BLD: 25 % (ref 12–49)
MAGNESIUM SERPL-MCNC: 1.8 MG/DL (ref 1.6–2.4)
MCH RBC QN AUTO: 30.7 PG (ref 26–34)
MCHC RBC AUTO-ENTMCNC: 37.2 G/DL (ref 30–36.5)
MCV RBC AUTO: 82.7 FL (ref 80–99)
MONOCYTES # BLD: 0.5 K/UL (ref 0–1)
MONOCYTES NFR BLD: 15 % (ref 5–13)
NEUTS SEG # BLD: 2.1 K/UL (ref 1.8–8)
NEUTS SEG NFR BLD: 60 % (ref 32–75)
NRBC # BLD: 0 K/UL (ref 0–0.01)
NRBC BLD-RTO: 0 PER 100 WBC
PHOSPHATE SERPL-MCNC: 1.8 MG/DL (ref 2.6–4.7)
PLATELET # BLD AUTO: 190 K/UL (ref 150–400)
PMV BLD AUTO: 9.6 FL (ref 8.9–12.9)
POTASSIUM SERPL-SCNC: 2.7 MMOL/L (ref 3.5–5.1)
POTASSIUM SERPL-SCNC: 3.3 MMOL/L (ref 3.5–5.1)
POTASSIUM SERPL-SCNC: 3.3 MMOL/L (ref 3.5–5.1)
RBC # BLD AUTO: 3.58 M/UL (ref 3.8–5.2)
SERVICE CMNT-IMP: NORMAL
SODIUM SERPL-SCNC: 114 MMOL/L (ref 136–145)
SODIUM SERPL-SCNC: 117 MMOL/L (ref 136–145)
SODIUM SERPL-SCNC: 122 MMOL/L (ref 136–145)
SODIUM UR-SCNC: 53 MMOL/L
WBC # BLD AUTO: 3.4 K/UL (ref 3.6–11)

## 2018-02-07 PROCEDURE — 74011000250 HC RX REV CODE- 250: Performed by: HOSPITALIST

## 2018-02-07 PROCEDURE — 65660000000 HC RM CCU STEPDOWN

## 2018-02-07 PROCEDURE — 83735 ASSAY OF MAGNESIUM: CPT | Performed by: INTERNAL MEDICINE

## 2018-02-07 PROCEDURE — 74011250636 HC RX REV CODE- 250/636: Performed by: HOSPITALIST

## 2018-02-07 PROCEDURE — 84300 ASSAY OF URINE SODIUM: CPT | Performed by: INTERNAL MEDICINE

## 2018-02-07 PROCEDURE — 36415 COLL VENOUS BLD VENIPUNCTURE: CPT | Performed by: INTERNAL MEDICINE

## 2018-02-07 PROCEDURE — 80048 BASIC METABOLIC PNL TOTAL CA: CPT | Performed by: INTERNAL MEDICINE

## 2018-02-07 PROCEDURE — 74011000250 HC RX REV CODE- 250: Performed by: INTERNAL MEDICINE

## 2018-02-07 PROCEDURE — 74011250637 HC RX REV CODE- 250/637: Performed by: INTERNAL MEDICINE

## 2018-02-07 PROCEDURE — 74011250637 HC RX REV CODE- 250/637: Performed by: HOSPITALIST

## 2018-02-07 PROCEDURE — 84100 ASSAY OF PHOSPHORUS: CPT | Performed by: INTERNAL MEDICINE

## 2018-02-07 PROCEDURE — 74011250636 HC RX REV CODE- 250/636: Performed by: INTERNAL MEDICINE

## 2018-02-07 PROCEDURE — 85025 COMPLETE CBC W/AUTO DIFF WBC: CPT | Performed by: HOSPITALIST

## 2018-02-07 RX ORDER — TOLVAPTAN 15 MG/1
15 TABLET ORAL ONCE
Status: COMPLETED | OUTPATIENT
Start: 2018-02-07 | End: 2018-02-07

## 2018-02-07 RX ORDER — POTASSIUM CHLORIDE 14.9 MG/ML
10 INJECTION INTRAVENOUS
Status: ACTIVE | OUTPATIENT
Start: 2018-02-07 | End: 2018-02-07

## 2018-02-07 RX ADMIN — Medication 5 ML: at 14:00

## 2018-02-07 RX ADMIN — CALCIUM CARBONATE-VITAMIN D TAB 500 MG-200 UNIT 1 TABLET: 500-200 TAB at 09:20

## 2018-02-07 RX ADMIN — Medication 400 MG: at 18:31

## 2018-02-07 RX ADMIN — Medication 400 MG: at 09:20

## 2018-02-07 RX ADMIN — Medication 10 ML: at 07:01

## 2018-02-07 RX ADMIN — SODIUM CHLORIDE 5 MG: 9 INJECTION INTRAMUSCULAR; INTRAVENOUS; SUBCUTANEOUS at 03:12

## 2018-02-07 RX ADMIN — MORPHINE SULFATE 2 MG: 2 INJECTION, SOLUTION INTRAMUSCULAR; INTRAVENOUS at 11:24

## 2018-02-07 RX ADMIN — ONDANSETRON 4 MG: 2 INJECTION INTRAMUSCULAR; INTRAVENOUS at 07:06

## 2018-02-07 RX ADMIN — POTASSIUM PHOSPHATE, MONOBASIC AND POTASSIUM PHOSPHATE, DIBASIC: 224; 236 INJECTION, SOLUTION, CONCENTRATE INTRAVENOUS at 16:05

## 2018-02-07 RX ADMIN — MORPHINE SULFATE 2 MG: 2 INJECTION, SOLUTION INTRAMUSCULAR; INTRAVENOUS at 00:39

## 2018-02-07 RX ADMIN — ENOXAPARIN SODIUM 40 MG: 40 INJECTION SUBCUTANEOUS at 09:20

## 2018-02-07 RX ADMIN — Medication 10 ML: at 21:18

## 2018-02-07 RX ADMIN — MORPHINE SULFATE 2 MG: 2 INJECTION, SOLUTION INTRAMUSCULAR; INTRAVENOUS at 07:00

## 2018-02-07 RX ADMIN — MORPHINE SULFATE 2 MG: 2 INJECTION, SOLUTION INTRAMUSCULAR; INTRAVENOUS at 16:05

## 2018-02-07 RX ADMIN — TOLVAPTAN 15 MG: 15 TABLET ORAL at 09:57

## 2018-02-07 RX ADMIN — ONDANSETRON 4 MG: 2 INJECTION INTRAMUSCULAR; INTRAVENOUS at 11:24

## 2018-02-07 RX ADMIN — SODIUM CHLORIDE 5 MG: 9 INJECTION INTRAMUSCULAR; INTRAVENOUS; SUBCUTANEOUS at 09:14

## 2018-02-07 RX ADMIN — ONDANSETRON 4 MG: 2 INJECTION INTRAMUSCULAR; INTRAVENOUS at 00:08

## 2018-02-07 RX ADMIN — SODIUM CHLORIDE 5 MG: 9 INJECTION INTRAMUSCULAR; INTRAVENOUS; SUBCUTANEOUS at 16:04

## 2018-02-07 NOTE — H&P
[facility]  ACUTE CARE HISTORY AND PHYSICAL    Name:ELDA RAVI  MR#: [id number]  : 1945  ACCOUNT #: [de-identified]   DATE OF SERVICE: 2018    CHIEF COMPLAINT:  Intractable nausea and vomiting. HISTORY OF PRESENT ILLNESS:  The patient is a 68-year-old  female with past medical history of right breast cancer status post lumpectomy and radiation therapy, history of diabetes mellitus type 2, genital herpes, gastroesophageal reflux disease, hypertension, dyslipidemia, osteopenia. She presented to the emergency department on account of intractable nausea and vomiting. She tells me that over a week ago, she had the flu which did not get better and that since then,  she has been having a cough. She reported the cough is productive of sputum. She does not know the color of the sputum. She tells me that she has been having intractable nausea and vomiting and that she has not been able to keep anything down. She, however, denies any fever or chills. No shortness of breath or chest pain. No abdominal pain. Today in the emergency department, she had a basic metabolic panel done which showed a serum sodium level of 110, potassium was 3.2. Patient was bolused with 1 liter of normal saline intravenous fluid and we have been consulted for hospital admission and for evaluation. RECENT HOSPITALIZATION:  None. PAST MEDICAL HISTORY:  1. Right breast cancer status post lumpectomy and radiation therapy. 2.  Diabetes mellitus type 2.  3.  Genital herpes. 4.  Gastroesophageal reflux disease. 5.  Hypertension. 6.  Dyslipidemia. 7.  Osteopenia. PAST SURGICAL HISTORY:  She is status post appendectomy and status post lithotripsy, status post bilateral hip replacement, status post tonsillectomy. HOME MEDICATIONS INCLUDE:    1. Albuterol 2 puffs via inhalation every 6 hours as needed for wheezing. 2.  Atorvastatin 40 mg p.o. daily.   3.  Calcium/cholecalciferol 1 tablet by mouth daily. 4.  Chlorthalidone 25 mg by mouth daily. 5.  Metformin 500 mg 2 times daily with meals. 6.  Multivitamin 1 tablet by mouth daily. 7.  Naproxen sodium 220 mg by mouth daily as needed. 8.  Olmesartan 20 mg by mouth daily. 9.  Omeprazole magnesium 1 tablet by mouth daily. 10.  Valacyclovir 500 mg by mouth daily. ALLERGIES:  SHE IS ALLERGIC TO PENICILLIN AND FEMARA. PENICILLIN REACTION, ITCHING. FEMARA REACTION, ODOR. FAMILY HISTORY:  She has no pertinent family history to tell me at this time. SOCIAL HISTORY:  Patient is , lives with her . 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 She does not smoke cigarettes or use illicit drugs. She drinks wine almost every night. REVIEW OF SYSTEMS:  CONSTITUTIONAL:  She denies any fever or chills, positive malaise. No unintentional weight loss. RESPIRATION:  No shortness of breath. CARDIOVASCULAR:  No chest pain or orthopnea. GASTROINTESTINAL:  Positive intractable nausea and vomiting. No abdominal pain or change in bowel habits. GENITOURINARY:  No dysuria or urgency. No hematuria. MUSCULOSKELETAL:  She is currently complaining of low back pain, now rated at about 7/10 in severity, described as a burning pain. NEUROLOGIC:  No syncope or presyncopal attacks. No extremity weakness. PHYSICAL EXAMINATION:  TRIAGE VITAL SIGNS:  Include temperature of 99.2, pulse 73 beats per minute, blood pressure 128/75, respirations 16, oxygen saturation is 96% on room air. GENERAL APPEARANCE:  She is lying on the bed. She appears ill looking, but not in any obvious distress. HEENT:  Atraumatic, normocephalic. Pupils are equal, round and reactive to light and accommodation. Extraocular muscles are intact. Oral mucosa is dry. NECK:  Supple. No JVD. RESPIRATION:  Clear to auscultation. CARDIOVASCULAR:  S1 and S2 heard. Regular rate and rhythm. No gallops or murmurs.   ABDOMEN:  Soft, nontender, nondistended. EXTREMITIES:  No pitting pedal edema. NEUROLOGIC:  She is somnolent, but awake. Orientation x4. No focal neurologic deficits. SKIN:  No obvious rashes seen. LABORATORY EVALUATION:  White blood cell count is 3.3, hemoglobin is 12.4, platelets 629, neutrophils 63. Urinalysis:  Protein is trace, glucose is 100, ketones trace. Chemistry:  Sodium of 110, potassium 3.2, chloride 74, bicarbonate 24, anion gap 12, glucose 136, BUN 10, creatinine 0.55, ALT 23, AST 49, alkaline phosphatase 91, lipase 147. Uric acid is 2.4. Lipase is 147. Troponin less than 0.04. Chest x-ray shows right mid to lower opacity, possible mass. CT scan of the chest recommended. EKG shows normal sinus rhythm at 71 beats per minute. ASSESSMENT AND PLAN:  The patient is a 68-year-old female who is presenting to the emergency room on account of nausea and vomiting and she was found to have severe hyponatremia. 1.  Severe hyponatremia. She is dehydrated. This is most likely due to hypovolemic hyponatremia in the setting of nausea and vomiting and thiazide diuretic. She is status post 1 liter of normal saline intravenous fluid in the ER. We will for now work up for hyponatremia to rule out concomitant etiology. We will check serum osmolality and urine osmolality, serum uric acid, urine electrolytes and urine osmolality. We will consult Nephrology for evaluation. We will monitor basic metabolic panel every 4 hours with a goal not to correct serum sodium more than 8 mEq in a 24-hour period. 2.  Nausea and vomiting, probably due to severe hyponatremia. 3.  History of diabetes mellitus. Her blood glucose is currently controlled. We will monitor for now. 4.  Possible right mid to lower lobe opacity, rule out mass. We will obtain a CT scan of the chest without contrast to rule out underlying mass lesion. 5.  History of hypertension. We will hold home chlorthalidone for now.   Her blood pressure is currently stable. 6.  Deep venous thrombosis prophylaxis. Lovenox 40 mg subcu daily. 7.  Hyponatremia. We will replete with KCl and monitor accordingly. DISPOSITION:  The patient will be admitted to intermediate care unit for further progression of care.       MD Dolly Garcia / Christina.Katrin  D: 02/06/2018 18:26     T: 02/06/2018 19:36  JOB #: [id number]

## 2018-02-07 NOTE — PROGRESS NOTES
Met with patient and  at bedside to introduce role and to assess for any needs/concerns regarding disposition. Patient admitted with complaints intractable nausea and vomiting. History of breast cancer,DM GERD and HTN. Patient is independent with her ADLs and does not use DMEs at this time. She does not anticipate any needs at this point. Care Management Interventions  PCP Verified by CM:  Yes (Dr Ramirez Doctor)  Palliative Care Criteria Met (RRAT>21 & CHF Dx)?: No  Mode of Transport at Discharge:  ('s car)  Transition of Care Consult (CM Consult): Discharge Planning  Current Support Network: Lives with Spouse, Own Home  Confirm Follow Up Transport: Family  Plan discussed with Pt/Family/Caregiver: Yes  Freedom of Choice Offered:  (NA)  Discharge Location  Discharge Placement:  (Home with family support)    Baudilio Lody RN CRM  Ext [phone]

## 2018-02-07 NOTE — PROGRESS NOTES
2044- Critical lab value called on -paged MD. Discussed NA level and MD ordered a BMP to be drawn at 0400 and any fluid given will be based on that NA level. 0450-Lab called with critical value for  & Potassium 2.7    Bedside shift change report given to Santino Galvin RN (oncoming nurse) by Elise Hodges RN (offgoing nurse). Report included the following information SBAR, Kardex, ED Summary, MAR, Accordion, Recent Results and Cardiac Rhythm NSR.

## 2018-02-07 NOTE — PROGRESS NOTES
RENAL  PROGRESS NOTE        Subjective:    COMPLAINT:less N-V,less back pain          Objective:   VITALS SIGNS:    Visit Vitals    /66 (BP 1 Location: Left arm, BP Patient Position: At rest)    Pulse 76    Temp 98.3 °F (36.8 °C)    Resp 18    Wt 81.1 kg (178 lb 12.7 oz)    SpO2 95%    BMI 32.7 kg/m2       O2 Device: Room air       Temp (24hrs), Av.1 °F (36.7 °C), Min:98 °F (36.7 °C), Max:98.3 °F (36.8 °C)         PHYSICAL EXAM:  No edema   NAD  Non focal neuro exam    DATA REVIEW:     INTAKE / OUTPUT:   Last shift:         Last 3 shifts: 1901 -  07  In: 104.2 [I.V.:104.2]  Out: 375 [Urine:375]    Intake/Output Summary (Last 24 hours) at 18 0902  Last data filed at 18 0548   Gross per 24 hour   Intake           104.17 ml   Output              375 ml   Net          -270.83 ml         LABS:   Recent Labs      1818   WBC  3.4*  3.3*   HGB  11.0*  12.4   HCT  29.6*  33.4*   PLT  190  232     Recent Labs      18   0333  18   2044  18   1617  18   1045  18   0718   NA  114*  114*  112*  112*  110*   K  2.7*  2.9*  3.1*  3.2*  3.2*   CL  79*  78*  78*  81*  74*   CO2  21  23  22  19*  24   GLU  125*  144*  115*  130*  136*   BUN  6  7  8  8  10   CREA  0.36*  0.41*  0.36*  0.39*  0.55   CA  7.0*  7.0*  7.3*  7.0*  7.9*   MG   --    --    --   1.4*   --    PHOS   --    --    --   1.7*   --    ALB   --    --    --    --   3.3*   TBILI   --    --    --    --   0.5   SGOT   --    --    --    --   49*   ALT   --    --    --    --   23           Assessment:   Hyponatremia,initially suspect hypovolemic/thiazide inducedbut her sodium improvement does not fit that diagnosis,worry about SIADH)to note she also has a Hx of breast CA treated in ,back pain);received 3% saline on 2. anmd sodium up by 4 pts;no 'urgent ' need to increase sodium;will give Tolvaptan one dose today   N-V  Back pain  URI symptoms better    Plan: tolvaptan one dose;no fluid restriction the 1 st 24 hr post tolvaptan  Serial sodium monitoring  KCL replacement  Recheck Mg,Phos  Avoid overcorrection  malignancy work up  Discuss all the above with her and her     Buddy Kennedy MD

## 2018-02-07 NOTE — PROGRESS NOTES
Hospitalist Progress Note  Meagan Burgos MD  Answering service: [phone] from in house phone         Date of Service:  2018  NAME:  Marifer Diaz  :  1945  MRN:  [id number]      Admission Summary:   N/v x 1 week now with hyponatremia     Interval history / Subjective:     Malaise, n/ no vomiting,       Assessment & Plan:     Hyponatremia 2n2 total vol depletion   Plan. Cont on ivf, prefer iv fluid ns repletion over  tolvaptan but nephrology has given and feel that interference with this med may be counterproductive    N/v x 1 week leading to above hyponatremia,        Code status: full   DVT prophylaxis: [address] discussed with: Patient/Family  Disposition: Home w/Family and TBD     Hospital Problems  Date Reviewed: 2017          Codes Class Noted POA    * (Principal)Hyponatremia ICD-10-CM: E87.1  ICD-9-CM: 276.1  2018 Yes        Intractable nausea and vomiting ICD-10-CM: R11.2  ICD-9-CM: 536.2  2018 Unknown                Review of Systems:   A comprehensive review of systems was negative except for that written in the HPI. Vital Signs:    Last 24hrs VS reviewed since prior progress note. Most recent are:  Visit Vitals    /80 (BP 1 Location: Left arm, BP Patient Position: At rest;Supine)    Pulse 76    Temp 98.2 °F (36.8 °C)    Resp 17    Ht 5' 1.81\" (1.57 m)    Wt 81.1 kg (178 lb 12.7 oz)    SpO2 97%    BMI 32.9 kg/m2         Intake/Output Summary (Last 24 hours) at 18 1511  Last data filed at 18 1410   Gross per 24 hour   Intake              700 ml   Output             2125 ml   Net            -1425 ml        Physical Examination:             Constitutional:  No acute distress, cooperative, pleasant    ENT:  Oral mucous moist, oropharynx benign. Neck supple,    Resp:  CTA bilaterally. No wheezing/rhonchi/rales.  No accessory muscle use   CV:  Regular rhythm, normal rate, no murmurs, gallops, rubs    GI:  Soft, non distended, non tender. normoactive bowel sounds, no hepatosplenomegaly     Musculoskeletal:  No edema, warm, 2+ pulses throughout    Neurologic:  Moves all extremities. AAOx3, CN II-XII reviewed     Psych:  Good insight, Not anxious nor agitated. Skin:  Good turgor, no rashes or ulcers       Data Review:    Review and/or order of clinical lab test      Labs:     Recent Labs      02/07/18 0333 02/06/18 0718   WBC  3.4*  3.3*   HGB  11.0*  12.4   HCT  29.6*  33.4*   PLT  190  232     Recent Labs      02/07/18   1331  02/07/18   0333  02/06/18   2044   02/06/18   1045  02/06/18 0718   NA  117*  114*  114*   < >  112*  110*   K  3.3*  2.7*  2.9*   < >  3.2*  3.2*   CL  82*  79*  78*   < >  81*  74*   CO2  25  21  23   < >  19*  24   BUN  6  6  7   < >  8  10   CREA  0.47*  0.36*  0.41*   < >  0.39*  0.55   GLU  132*  125*  144*   < >  130*  136*   CA  7.9*  7.0*  7.0*   < >  7.0*  7.9*   MG  1.8   --    --    --   1.4*   --    PHOS  1.8*   --    --    --   1.7*   --    URICA   --    --    --    --    --   2.4*    < > = values in this interval not displayed. Recent Labs      02/06/18 0718   SGOT  49*   ALT  23   AP  91   TBILI  0.5   TP  6.7   ALB  3.3*   GLOB  3.4   LPSE  147     No results for input(s): INR, PTP, APTT in the last 72 hours. No lab exists for component: INREXT   No results for input(s): FE, TIBC, PSAT, FERR in the last 72 hours. No results found for: FOL, RBCF   No results for input(s): PH, PCO2, PO2 in the last 72 hours.   Recent Labs      02/06/18 0718   TROIQ  <0.04     Lab Results   Component Value Date/Time    Cholesterol, total 161 07/05/2017 08:15 AM    HDL Cholesterol 64 07/05/2017 08:15 AM    LDL, calculated 80 07/05/2017 08:15 AM    Triglyceride 83 07/05/2017 08:15 AM     No results found for: [facility]  Lab Results   Component Value Date/Time    Color YELLOW/STRAW 02/06/2018 07:35 AM    Appearance CLEAR 02/06/2018 07:35 AM    Specific gravity 1.016 02/06/2018 07:35 AM    pH (UA) 8.0 02/06/2018 07:35 AM    Protein TRACE (A) 02/06/2018 07:35 AM    Glucose 100 (A) 02/06/2018 07:35 AM    Ketone TRACE (A) 02/06/2018 07:35 AM    Bilirubin NEGATIVE  02/06/2018 07:35 AM    Urobilinogen 0.2 02/06/2018 07:35 AM    Nitrites NEGATIVE  02/06/2018 07:35 AM    Leukocyte Esterase MODERATE (A) 02/06/2018 07:35 AM    Epithelial cells FEW 02/06/2018 07:35 AM    Bacteria NEGATIVE  02/06/2018 07:35 AM    WBC 5-10 02/06/2018 07:35 AM    RBC 0-5 02/06/2018 07:35 AM         Medications Reviewed:     Current Facility-Administered Medications   Medication Dose Route Frequency    potassium phosphate 20 mmol in 0.9% sodium chloride 250 mL infusion   IntraVENous ONCE    calcium-vitamin D (OS-YOANA) 500 mg-200 unit tablet  1 Tab Oral DAILY WITH BREAKFAST    sodium chloride (NS) flush 5-10 mL  5-10 mL IntraVENous Q8H    sodium chloride (NS) flush 5-10 mL  5-10 mL IntraVENous PRN    enoxaparin (LOVENOX) injection 40 mg  40 mg SubCUTAneous Q24H    morphine injection 2 mg  2 mg IntraVENous Q4H PRN    acetaminophen (TYLENOL) tablet 650 mg  650 mg Oral Q6H PRN    guaiFENesin (ROBITUSSIN) 100 mg/5 mL oral liquid 100 mg  100 mg Oral Q4H PRN    ondansetron (ZOFRAN) injection 4 mg  4 mg IntraVENous Q4H PRN    prochlorperazine (COMPAZINE) with saline injection 5 mg  5 mg IntraVENous Q6H PRN    albuterol (PROVENTIL VENTOLIN) nebulizer solution 2.5 mg  2.5 mg Nebulization Q6H PRN    magnesium oxide (MAG-OX) tablet 400 mg  400 mg Oral BID    sodium chloride (NS) flush 5-10 mL  5-10 mL IntraVENous Q8H    sodium chloride (NS) flush 5-10 mL  5-10 mL IntraVENous PRN     ______________________________________________________________________  EXPECTED LENGTH OF STAY: 2d 16h  ACTUAL LENGTH OF STAY:          1                 Rahul Palacios MD

## 2018-02-07 NOTE — PROGRESS NOTES
Per night shift RN, night hospitalist notified of pt's K 2.7. Orders received to give 4 runs of potassium. However, bags were not scanned due to [facility] down time. All 4 runs of potassium were given. MD Florie Phoenix and pharmacist Stephanie Hicks notified. Order received to draw BMP at 1300. Will continue to monitor. Problem: Fluid Volume - Risk of, Imbalanced  Goal: *Balanced intake and output  Outcome: Progressing Towards Goal  Nephrology following, BMP, Mag and Phos to be drawn now. Problem: Falls - Risk of  Goal: *Absence of Falls  Document Zohra Fall Risk and appropriate interventions in the flowsheet. Fall Risk Interventions:     Pt has not fallen.  at bedside. Call bell in use. Medication Interventions: Assess postural VS orthostatic hypotension, Patient to call before getting OOB, Teach patient to arise slowly    Elimination Interventions: Call light in reach, Patient to call for help with toileting needs, Toilet paper/wipes in reach, Toileting schedule/hourly rounds     2000  Bedside and Verbal shift change report given to Severiano Space, RN (oncoming nurse) by Carloz Eddy (offgoing nurse). Report included the following information SBAR, ED Summary, Procedure Summary, Intake/Output, MAR, Recent Results and Cardiac Rhythm nsr. Hourly rounds performed.

## 2018-02-08 VITALS
DIASTOLIC BLOOD PRESSURE: 82 MMHG | BODY MASS INDEX: 30.79 KG/M2 | RESPIRATION RATE: 18 BRPM | HEART RATE: 88 BPM | SYSTOLIC BLOOD PRESSURE: 124 MMHG | WEIGHT: 167.33 LBS | TEMPERATURE: 98.6 F | OXYGEN SATURATION: 97 % | HEIGHT: 62 IN

## 2018-02-08 PROBLEM — E87.1 HYPONATREMIA: Status: RESOLVED | Noted: 2018-02-06 | Resolved: 2018-02-08

## 2018-02-08 PROBLEM — R11.2 INTRACTABLE NAUSEA AND VOMITING: Status: RESOLVED | Noted: 2018-02-06 | Resolved: 2018-02-08

## 2018-02-08 LAB
ANION GAP SERPL CALC-SCNC: 9 MMOL/L (ref 5–15)
BUN SERPL-MCNC: 8 MG/DL (ref 6–20)
BUN/CREAT SERPL: 14 (ref 12–20)
CALCIUM SERPL-MCNC: 8.6 MG/DL (ref 8.5–10.1)
CHLORIDE SERPL-SCNC: 92 MMOL/L (ref 97–108)
CO2 SERPL-SCNC: 26 MMOL/L (ref 21–32)
CREAT SERPL-MCNC: 0.59 MG/DL (ref 0.55–1.02)
GLUCOSE SERPL-MCNC: 110 MG/DL (ref 65–100)
PHOSPHATE SERPL-MCNC: 2.1 MG/DL (ref 2.6–4.7)
POTASSIUM SERPL-SCNC: 3.4 MMOL/L (ref 3.5–5.1)
SODIUM SERPL-SCNC: 127 MMOL/L (ref 136–145)

## 2018-02-08 PROCEDURE — 84100 ASSAY OF PHOSPHORUS: CPT | Performed by: INTERNAL MEDICINE

## 2018-02-08 PROCEDURE — 74011250636 HC RX REV CODE- 250/636: Performed by: INTERNAL MEDICINE

## 2018-02-08 PROCEDURE — 74011250637 HC RX REV CODE- 250/637: Performed by: INTERNAL MEDICINE

## 2018-02-08 PROCEDURE — 80048 BASIC METABOLIC PNL TOTAL CA: CPT | Performed by: INTERNAL MEDICINE

## 2018-02-08 PROCEDURE — 74011250636 HC RX REV CODE- 250/636: Performed by: HOSPITALIST

## 2018-02-08 PROCEDURE — 36415 COLL VENOUS BLD VENIPUNCTURE: CPT | Performed by: INTERNAL MEDICINE

## 2018-02-08 PROCEDURE — 74011250637 HC RX REV CODE- 250/637: Performed by: HOSPITALIST

## 2018-02-08 RX ORDER — DEXTROSE MONOHYDRATE 50 MG/ML
75 INJECTION, SOLUTION INTRAVENOUS CONTINUOUS
Status: DISCONTINUED | OUTPATIENT
Start: 2018-02-08 | End: 2018-02-08

## 2018-02-08 RX ORDER — POTASSIUM CHLORIDE 750 MG/1
20 TABLET, FILM COATED, EXTENDED RELEASE ORAL
Status: COMPLETED | OUTPATIENT
Start: 2018-02-08 | End: 2018-02-08

## 2018-02-08 RX ORDER — SODIUM,POTASSIUM PHOSPHATES 280-250MG
1 POWDER IN PACKET (EA) ORAL 2 TIMES DAILY
Status: DISCONTINUED | OUTPATIENT
Start: 2018-02-08 | End: 2018-02-08 | Stop reason: HOSPADM

## 2018-02-08 RX ORDER — ONDANSETRON 4 MG/1
4 TABLET, ORALLY DISINTEGRATING ORAL
Qty: 12 TAB | Refills: 0 | Status: SHIPPED | OUTPATIENT
Start: 2018-02-08 | End: 2018-02-15

## 2018-02-08 RX ADMIN — CALCIUM CARBONATE-VITAMIN D TAB 500 MG-200 UNIT 1 TABLET: 500-200 TAB at 08:45

## 2018-02-08 RX ADMIN — Medication 10 ML: at 06:30

## 2018-02-08 RX ADMIN — ENOXAPARIN SODIUM 40 MG: 40 INJECTION SUBCUTANEOUS at 08:46

## 2018-02-08 RX ADMIN — SODIUM CHLORIDE 1000 ML: 900 INJECTION, SOLUTION INTRAVENOUS at 08:08

## 2018-02-08 RX ADMIN — Medication 400 MG: at 08:45

## 2018-02-08 RX ADMIN — POTASSIUM CHLORIDE 20 MEQ: 750 TABLET, EXTENDED RELEASE ORAL at 08:45

## 2018-02-08 RX ADMIN — POTASSIUM & SODIUM PHOSPHATES POWDER PACK 280-160-250 MG 1 PACKET: 280-160-250 PACK at 08:45

## 2018-02-08 NOTE — PROGRESS NOTES
RENAL  PROGRESS NOTE        Subjective:    Lawerence Kand to 'normal' as per her;thirsty and was polyuric overnight    Objective:   VITALS SIGNS:    Visit Vitals    /79 (BP 1 Location: Left arm, BP Patient Position: At rest)    Pulse 91    Temp 98.6 °F (37 °C)    Resp 16    Ht 5' 1.81\" (1.57 m)    Wt 75.9 kg (167 lb 5.3 oz)    SpO2 97%    BMI 30.79 kg/m2       O2 Device: Room air       Temp (24hrs), Av.4 °F (36.9 °C), Min:98.2 °F (36.8 °C), Max:98.6 °F (37 °C)         PHYSICAL EXAM:  No edema   NAD  Non focal neuro exam    DATA REVIEW:     INTAKE / OUTPUT:   Last shift:         Last 3 shifts:  190 -  0700  In: 1050 [P.O.:1050]  Out: 4875 [Urine:4875]    Intake/Output Summary (Last 24 hours) at 18 0741  Last data filed at 18 1936   Gross per 24 hour   Intake             1050 ml   Output             4500 ml   Net            -3450 ml         LABS:   Recent Labs      18   0333  18   0718   WBC  3.4*  3.3*   HGB  11.0*  12.4   HCT  29.6*  33.4*   PLT  190  232     Recent Labs      18   0350  18   1726  18   1331   18   1045  18   0718   NA  127*  122*  117*   < >  112*  110*   K  3.4*  3.3*  3.3*   < >  3.2*  3.2*   CL  92*  87*  82*   < >  81*  74*   CO2  26  26  25   < >  19*  24   GLU  110*  123*  132*   < >  130*  136*   BUN  8  6  6   < >  8  10   CREA  0.59  0.53*  0.47*   < >  0.39*  0.55   CA  8.6  8.4*  7.9*   < >  7.0*  7.9*   MG   --    --   1.8   --   1.4*   --    PHOS  2.1*   --   1.8*   --   1.7*   --    ALB   --    --    --    --    --   3.3*   TBILI   --    --    --    --    --   0.5   SGOT   --    --    --    --    --   49*   ALT   --    --    --    --    --   23    < > = values in this interval not displayed.            Assessment:   Hyponatremia   2nd to SIADH ,  note she also has a Hx of breast CA treated in ,back pain);received 3% saline on 2.6.17 anmd sodium up by 4 pts;no 'urgent ' need to increase sodium;will give Tolvaptan one dose on 2.7.18;sodium up to 127,slight overcorrection despite good PO fluids   N-V ,resolved  Low K,low Phos  Back pain  URI symptoms better    Plan:   [place] fluid for today  D5W to slow down increase of sodium    Serial sodium monitoring   will recommend PET scan as OP to r,o malignancy  Recheck Mg,Phos      Discuss all the above with her      Juan Luis Gross MD

## 2018-02-08 NOTE — PROGRESS NOTES
Hospital follow-up PCP transitional care appointment has been scheduled with Ana Nash NP for Thursday, 2/15/18 at 1:45 p.m. Pending patient discharge.   Blaise Card, Care Management Specialist.

## 2018-02-08 NOTE — DISCHARGE INSTRUCTIONS
Discharge Instructions       PATIENT ID: Cornelius Mitchell  MRN: [id number]   YOB: 1945    DATE OF ADMISSION: 2/6/2018  6:52 AM    DATE OF DISCHARGE: 2/8/2018    PRIMARY CARE PROVIDER: Cristiano Baxter MD     ATTENDING PHYSICIAN: Oziel Astorga MD  DISCHARGING PROVIDER: Wing Manjit MD    To contact this individual call [phone] and ask the  to page. If unavailable ask to be transferred the Adult Hospitalist Department. DISCHARGE DIAGNOSES n/v with hyponatremia 2n2 vol losses     CONSULTATIONS: IP CONSULT TO HOSPITALIST  IP CONSULT TO NEPHROLOGY    PROCEDURES/SURGERIES: * No surgery found *    PENDING TEST RESULTS:   At the time of discharge the following test results are still pending: na    FOLLOW UP APPOINTMENTS:   Follow-up Information     Follow up With Details Comments Contact Info    Cristiano Baxter MD In 1 week [facility],  [address] [place] [address] [phone]             ADDITIONAL CARE RECOMMENDATIONS:  Note avoid diuretic, avoid NSAIDs as in naproxen , ibuprofen, other,     Use tylenol as needed. DIET: Resume previous diet     ACTIVITY: Activity as tolerated    WOUND CARE: na    EQUIPMENT needed: na      DISCHARGE MEDICATIONS:   See Medication Reconciliation Form    · It is important that you take the medication exactly as they are prescribed. · Keep your medication in the bottles provided by the pharmacist and keep a list of the medication names, dosages, and times to be taken in your wallet. · Do not take other medications without consulting your doctor. NOTIFY YOUR PHYSICIAN FOR ANY OF THE FOLLOWING:   Fever over 101 degrees for 24 hours. Chest pain, shortness of breath, fever, chills, nausea, vomiting, diarrhea, change in mentation, falling, weakness, bleeding. Severe pain or pain not relieved by medications. Or, any other signs or symptoms that you may have questions about.       DISPOSITION:    Home With:   OT  PT  [facility]  RN SNF/Inpatient Rehab/[facility]   x Independent/assisted living    Hospice    Other:            Signed:   Chicho Lyn MD  2/8/2018  10:40 AM

## 2018-02-08 NOTE — PROGRESS NOTES
Pt awake, A&O without complaints during bedside verbal report. Dr Matthew Pride in to see pt. Pt tolerated breakfast well    Discharge orders recvd    DC instructions and prescription reviewed with pt and . Tele and IV DC'd. Opportunity for questions provided. DC to home with . Escorted to car by volunteer services.

## 2018-02-08 NOTE — DISCHARGE SUMMARY
Discharge Summary       PATIENT ID: Billye Brittle  MRN: [id number]   YOB: 1945    DATE OF ADMISSION: 2/6/2018  6:52 AM    DATE OF DISCHARGE: 2/8/2018    PRIMARY CARE PROVIDER: Nancy Reddy MD     ATTENDING PHYSICIAN: Jake Mendoza MD   DISCHARGING PROVIDER: Jake Mendoza MD    To contact this individual call [phone] and ask the  to page. If unavailable ask to be transferred the Adult Hospitalist Department. CONSULTATIONS: IP CONSULT TO HOSPITALIST  IP CONSULT TO NEPHROLOGY    PROCEDURES/SURGERIES: * No surgery found *    ADMITTING DIAGNOSES & HOSPITAL COURSE:     Pt with n/v and hyponatremia,  Slow iv fluid electrolyte replacement after intial 3%,  Now eating and sodium steadily rising and pt asymptomatic, eat ful breakfast 2/8/2018 is discharged to f/u with pcp in one weeks. ( did get ToVaptan x 1 per nephrology but likely would have done well w/o . ) V resolved on admission. Nausea by end of day 2/7. Of note; Tolvaptan dosed 0900 2/7 am  : per UpToDate: Half-life elimination: ~12 hours; dominant half-life <12 hours  That is greatest activity out of system by am 2/8/2018 , pt eating well, no recurrent n/v, pt will finish correction of electrolytes with time and no more insults (n/v/ and use of HCTZ)       DISCHARGE DIAGNOSES / PLAN:      1.  hyponatremia  2. N/v intractable, resolved.         PENDING TEST RESULTS:   At the time of discharge the following test results are still pending: na    FOLLOW UP APPOINTMENTS:    Follow-up Information     Follow up With Details Comments Contact Info    Nancy Reddy MD In 1 week [facility],  [address] [phone]             ADDITIONAL CARE RECOMMENDATIONS:  Avoid NSAIDA, AND STOP DIURETIC   DIET: Resume previous diet     ACTIVITY: Activity as tolerated    WOUND CARE: NA    EQUIPMENT needed: NA      DISCHARGE MEDICATIONS:  Current Discharge Medication List      START taking these medications    Details ondansetron (ZOFRAN ODT) 4 mg disintegrating tablet Take 1 Tab by mouth every eight (8) hours as needed for Nausea. Qty: 12 Tab, Refills: 0         CONTINUE these medications which have NOT CHANGED    Details   atorvastatin (LIPITOR) 40 mg tablet TAKE 1 TABLET BY MOUTH  DAILY  Qty: 90 Tab, Refills: 0      metFORMIN (GLUCOPHAGE) 500 mg tablet TAKE 1 TABLET BY MOUTH TWO  TIMES DAILY WITH MEALS  Qty: 180 Tab, Refills: 1    Associated Diagnoses: Diabetes type 2, controlled (HCC)      valACYclovir (VALTREX) 500 mg tablet TAKE 1 TABLET BY MOUTH  DAILY  Qty: 90 Tab, Refills: 1    Associated Diagnoses: Genital herpes simplex, unspecified site      albuterol (PROVENTIL HFA, VENTOLIN HFA, PROAIR HFA) 90 mcg/actuation inhaler Take 2 Puffs by inhalation every six (6) hours as needed for Wheezing. Qty: 1 Inhaler, Refills: 0    Associated Diagnoses: Bronchitis      olmesartan (BENICAR) 20 mg tablet TAKE 1 TABLET BY MOUTH  DAILY  Qty: 90 Tab, Refills: 1    Associated Diagnoses: HTN, goal below 150/90      calcium-cholecalciferol, D3, (CALTRATE 600+D) tablet Take 1 Tab by mouth daily. multivitamin (ONE A DAY) tablet Take 1 Tab by mouth daily. Associated Diagnoses: Neck pain      OMEPRAZOLE MAGNESIUM (PRILOSEC OTC PO) Take 1 Tab by mouth daily. STOP taking these medications       chlorthalidone (HYGROTEN) 25 mg tablet Comments:   Reason for Stopping:         naproxen sodium (ALEVE) 220 mg tablet Comments:   Reason for Stopping:                 NOTIFY YOUR PHYSICIAN FOR ANY OF THE FOLLOWING:   Fever over 101 degrees for 24 hours. Chest pain, shortness of breath, fever, chills, nausea, vomiting, diarrhea, change in mentation, falling, weakness, bleeding. Severe pain or pain not relieved by medications. Or, any other signs or symptoms that you may have questions about.     DISPOSITION:    Home With:   OT  PT  HH  RN       Long term SNF/Inpatient Rehab    Independent/assisted living    Hospice    Other: PATIENT CONDITION AT DISCHARGE:     Functional status    Poor     Deconditioned     Independent      Cognition     Lucid     Forgetful     Dementia      Catheters/lines (plus indication)    Moreira     PICC     PEG     None      Code status     Full code     DNR      PHYSICAL EXAMINATION AT DISCHARGE:   Refer to Progress Note  PUL; CLEAR  CV: rr no m/g/r  Abd; soft non tender  EXt : w and d no edema   Visit Vitals    /79 (BP 1 Location: Left arm, BP Patient Position: At rest)    Pulse 91    Temp 98.6 °F (37 °C)    Resp 16    Ht 5' 1.81\" (1.57 m)    Wt 75.9 kg (167 lb 5.3 oz)    SpO2 97%    BMI 30.79 kg/m2    neuro grossly nl alert 0 x 3       CHRONIC MEDICAL DIAGNOSES:  Problem List as of 2/8/2018  Date Reviewed: 2/8/2018          Codes Class Noted - Resolved    Malignant neoplasm of right female breast ([facility] ICD-10-CM: C50.911  ICD-9-CM: 174.9  6/16/2017 - Present        GERD (gastroesophageal reflux disease) ICD-10-CM: K21.9  ICD-9-CM: 530.81  Unknown - Present        Diabetes mellitus, controlled ([facility] ICD-10-CM: E11.9  ICD-9-CM: 250.00  9/3/2015 - Present        Hyperlipemia ICD-10-CM: E78.5  ICD-9-CM: 272.4  11/26/2014 - Present        Osteopenia ICD-10-CM: M85.80  ICD-9-CM: 733.90  3/10/2010 - Present        Genital herpes ICD-10-CM: A60.00  ICD-9-CM: 054.10  3/10/2010 - Present        HTN, goal below 140/90 ICD-10-CM: I10  ICD-9-CM: 401.9  3/8/2010 - Present        Breast cancer ([facility] ICD-10-CM: C50.919  ICD-9-CM: 174.9  1/1/2003 - Present    Overview Signed 5/25/2017 12:05 PM by Юлия Will MD     lumpectomy + radiation right breast             * (Principal)RESOLVED: Hyponatremia ICD-10-CM: E87.1  ICD-9-CM: 276.1  2/6/2018 - 2/8/2018        RESOLVED: Intractable nausea and vomiting ICD-10-CM: R11.2  ICD-9-CM: 536.2  2/6/2018 - 2/8/2018        RESOLVED: Diabetes mellitus ([facility] ICD-10-CM: E11.9  ICD-9-CM: 250.00  5/7/2015 - 5/25/2017    Overview Signed 9/3/2015 10:52 AM by Marielle Maya DO [place]     First diagnosed                   Greater than  30  minutes were spent with the patient on counseling and coordination of care    Signed:   Juventino Villagomez MD  2/8/2018  10:41 AM

## 2018-02-08 NOTE — PROGRESS NOTES
Bedside shift change report given to [facility] (oncoming nurse) by Franco Garibay RN (offgoing nurse). Report included the following information SBAR, Kardex, Intake/Output, MAR, Accordion, Recent Results and Cardiac Rhythm NSR.

## 2018-02-08 NOTE — PROGRESS NOTES
Problem: Fluid Volume - Risk of, Imbalanced  Goal: *Balanced intake and output  Outcome: Progressing Towards Goal  Patient's output has exceeded her input. MD has talked of placing patient on fluid restriction to help balance her sodium level    Problem: Falls - Risk of  Goal: *Absence of Falls  Document Zohra Fall Risk and appropriate interventions in the flowsheet. Outcome: Progressing Towards Goal  Bed is in the lowest position and wheels are locked, call bell is within reach, bathroom light is on during evening hours, gripper socks are on and patient has been instructed to call out for assistance if needed. As of now, patient is free from falls and will continue to be monitored.          Fall Risk Interventions:  Mobility Interventions: Assess mobility with egress test, Communicate number of staff needed for ambulation/transfer, Patient to call before getting OOB         Medication Interventions: Assess postural VS orthostatic hypotension, Patient to call before getting OOB, Teach patient to arise slowly    Elimination Interventions: Call light in reach, Patient to call for help with toileting needs, Toilet paper/wipes in reach, Toileting schedule/hourly rounds

## 2018-02-09 ENCOUNTER — PATIENT OUTREACH (OUTPATIENT)
Dept: FAMILY MEDICINE CLINIC | Age: 73
End: 2018-02-09

## 2018-02-09 NOTE — PROGRESS NOTES
Whitney Kayser is a 81 y.o. female   This patient was received as a referral from inpatient report. Patient to Ed with complaints of 5/10 generalized body aches, cough beginning last Tuesday, followed by vomiting on Thursday, subsided on Friday and reports generalized weakness, poor appetite and vomiting beginning again yesterday. Denies diarrhea according to triage note. NN was able to reach patient at her home. Patient says she had the Flu which caused the N/V and depletion of lytes. Decided to go to hospital and was admitted. Feeling much better now. Inpatient RRAT Score: 14  Visit Vitals    /79 (BP 1 Location: Left arm, BP Patient Position: At rest)    Pulse 91    Temp 98.6 °F (37 °C)    Resp 16    Ht 5' 1.81\" (1.57 m)    Wt 75.9 kg (167 lb 5.3 oz)    SpO2 97%    BMI 30.79 kg/m2       Patient's challenges to self management identified:  Patient admits to self care, independent. Spouse at home to help if needed. Medication Management: good adherence, good understanding    Summary of patients top three problems:     Problem 1: Unsteady gait potential for fall- due to N/V, weakness, admission sodium level  Low    2/6/2018  8:21 AM - [facility], [facility] [employer]   Component Results   Component Value Flag Ref Range Units Status   Sodium 110 (LL) 136 - 145 mmol/L Final   Comment:   RESULTS VERIFIED, PHONED TO AND READ BACK BY   ALAINA RODRIGUEZ AT 0821/LDP      Potassium 3.2 (L) 3.5 - 5.1 mmol/L Final   Chloride 74 (L) 97 - 108 mmol/L Final   CO2 24  21 - 32 mmol/L Final   Anion gap 12  5 - 15 mmol/L Final   Glucose 136 (H) 65 - 100 mg/dL Final      Problem 2: hyponatremia-Serial sodium monitoring, recommend PET scanOP  to r,o malignancy frequent monitoring of  Mg,Phos, lytes     Problem 3: Type 2 diabetes- Patient verbalized concern about getting HA1C done,  was told it would be scheduled at different appointment as fasting.     LABS:        Recent Labs       02/07/18   0333  02/06/18   0718   WBC  3.4*  3.3*   HGB 11.0*  12.4   HCT  29.6*  33.4*   PLT  190  232               Recent Labs       02/08/18   0350  02/07/18   1726  02/07/18   1331    02/06/18   1045  02/06/18   0718   NA  127*  122*  117*   < >  112*  110*   K  3.4*  3.3*  3.3*   < >  3.2*  3.2*   CL  92*  87*  82*   < >  81*  74*   CO2  26  26  25   < >  19*  24   GLU  110*  123*  132*   < >  130*  136*   BUN  8  6  6   < >  8  10   CREA  0.59  0.53*  0.47*   < >  0.39*  0.55   CA  8.6  8.4*  7.9*   < >  7.0*  7.9*   MG   --    --   1.8   --   1.4*   --    PHOS  2.1*   --   1.8*   --   1.7*   --    ALB   --    --    --    --    --   3.3*   TBILI   --    --    --    --    --   0.5   SGOT   --    --    --    --    --   49*   ALT   --    --    --    --    --   23    < > = values in this interval not displayed. Study Result   EXAM:  XR CHEST PORT     INDICATION:  Chest pain, generalized body aches, cough x1 week.     COMPARISON:  9/20/2011.     FINDINGS:    An AP portable view was obtained of the chest.    The heart is normal in size. The aorta is atherosclerotic. A right mid to lower opacity possible mass is seen. Thoracolumbar scoliosis and degenerative change of the spine are noted.     IMPRESSION  IMPRESSION:   Right mid to lower opacity possible mass.     CT scan recommended.        CT Impression-Impression   IMPRESSION:   There is benign-appearing calcification along the anterolateral right chest wall  correlating with recent chest radiographic finding. This is adjacent to a  presumed post surgical collection in the right breast. There is no lung mass or  other acute abnormality in the chest.        .  Goals Addressed        Patient Stated     Patient/Family verbalizes understanding of self-management of chronic disease. (pt-stated)                  2/9/18- Patient verbalized understanding of discharge instruction, plan of care and how and when to contact providers if needed.  pk         Other     Supportive resources in place to maintain patient in the community (ie. Home Health, DME equipment, refer to, medication assistant plan, etc.)                  2/9/18- NN offered to set up referrals for home health, or equipment that may be available to patient, patient declined, stating she is independent with her ADLs and does not use DMEs at this time. She does not anticipate any needs at this point. pk       To decrease or maintain patient's biometrics:  HgA1c ?7 mg/dL, /80 or less. LDL of less than 100 and/or less than 70 in a patient with CAD.                  2/9/18- Patient educated on skills needed to manage chronic disease processes. pk    2/9/18- encouraged to log BP and BS in a log book to bring in so PCP can evaluate at visits. pk              Patients motivational level on a scale of 0-10: 10  Advance Care Planning: not discussed during this assessment    . Current Outpatient Prescriptions   Medication Sig    ondansetron (ZOFRAN ODT) 4 mg disintegrating tablet Take 1 Tab by mouth every eight (8) hours as needed for Nausea.  atorvastatin (LIPITOR) 40 mg tablet TAKE 1 TABLET BY MOUTH  DAILY    metFORMIN (GLUCOPHAGE) 500 mg tablet TAKE 1 TABLET BY MOUTH TWO  TIMES DAILY WITH MEALS    valACYclovir (VALTREX) 500 mg tablet TAKE 1 TABLET BY MOUTH  DAILY    albuterol (PROVENTIL HFA, VENTOLIN HFA, PROAIR HFA) 90 mcg/actuation inhaler Take 2 Puffs by inhalation every six (6) hours as needed for Wheezing.  olmesartan (BENICAR) 20 mg tablet TAKE 1 TABLET BY MOUTH  DAILY    calcium-cholecalciferol, D3, (CALTRATE 600+D) tablet Take 1 Tab by mouth daily.  multivitamin (ONE A DAY) tablet Take 1 Tab by mouth daily.  OMEPRAZOLE MAGNESIUM (PRILOSEC OTC PO) Take 1 Tab by mouth daily. No current facility-administered medications for this visit.       [employer], Referrals, and Durable Medical Equipment: none needed    Follow up appointments:  Future Appointments      Provider Arsh Galeana   2/15/2018 1:45 PM Jossie Kulkarni NP Patient verbalized understanding of all information discussed. Patient has this Nurse Navigators contact information for any further questions, concerns, or needs.

## 2018-02-09 NOTE — PATIENT INSTRUCTIONS
Hyponatremia: Care Instructions  Your Care Instructions    Hyponatremia (say \"gx-et-toq-TREE-jose luis-uh\") means that you don't have enough sodium in your blood. It can cause nausea, vomiting, and headaches. Or you may not feel hungry. In serious cases, it can cause seizures, a coma, or even death. Hyponatremia is not a disease. It is a problem caused by something else, such as medicines or exercising for a long time in hot weather. You can get hyponatremia if you lose a lot of fluids and then you drink a lot of water or other liquids that don't have much sodium. You can also get it if you have kidney, liver, heart, or other health problems. Treatment is focused on getting your sodium levels back to normal.  Follow-up care is a key part of your treatment and safety. Be sure to make and go to all appointments, and call your doctor if you are having problems. It's also a good idea to know your test results and keep a list of the medicines you take. How can you care for yourself at home? · If your doctor recommends it, drink fluids that have sodium. Sports drinks are a good choice. Or you can eat salty foods. · If your doctor recommends it, limit the amount of water you drink. And limit fluids that are mostly water. These include tea, coffee, and juice. · Take your medicines exactly as prescribed. Call your doctor if you have any problems with your medicine. · Get your sodium levels tested when your doctor tells you to. When should you call for help? Call 911 anytime you think you may need emergency care. For example, call if:  ? · You have a seizure. ? · You passed out (lost consciousness). ?Call your doctor now or seek immediate medical care if:  ? · You are confused or it is hard to focus. ? · You have little or no appetite. ? · You feel sick to your stomach or you vomit. ? · You have a headache. ? · You have mood changes. ? · You feel more tired than usual.   ? Watch closely for changes in your health, and be sure to contact your doctor if:  ? · You do not get better as expected. Where can you learn more? Go to http://ivy-yoandy.info/. Enter N711 in the search box to learn more about \"Hyponatremia: Care Instructions. \"  Current as of: October 14, 2016  Content Version: 11.4  © 5190-1501 [employer]. Care instructions adapted under license by [employer] (which disclaims liability or warranty for this information). If you have questions about a medical condition or this instruction, always ask your healthcare professional. [place] [address] any warranty or liability for your use of this information.

## 2018-02-14 ENCOUNTER — TELEPHONE (OUTPATIENT)
Dept: FAMILY MEDICINE CLINIC | Age: 73
End: 2018-02-14

## 2018-02-14 NOTE — TELEPHONE ENCOUNTER
Call to Dr. Viral Nova. Advised pcp is out of office right now. He states this is not urgent at all he would like to talk with physician when she gets a chance in regards to labs and other kidney concerns. Dr. Kal Xiong call back number: [phone].

## 2018-02-14 NOTE — TELEPHONE ENCOUNTER
Dr. Aakash Gutierrez doctor to patient. The  would like to speak with Renzo Knox. Her Cell #  [phone].

## 2018-02-15 ENCOUNTER — TELEPHONE (OUTPATIENT)
Dept: FAMILY MEDICINE CLINIC | Age: 73
End: 2018-02-15

## 2018-02-15 ENCOUNTER — OFFICE VISIT (OUTPATIENT)
Dept: FAMILY MEDICINE CLINIC | Age: 73
End: 2018-02-15

## 2018-02-15 VITALS
TEMPERATURE: 98.8 F | HEART RATE: 86 BPM | HEIGHT: 62 IN | SYSTOLIC BLOOD PRESSURE: 118 MMHG | BODY MASS INDEX: 31.65 KG/M2 | OXYGEN SATURATION: 94 % | DIASTOLIC BLOOD PRESSURE: 60 MMHG | WEIGHT: 172 LBS | RESPIRATION RATE: 18 BRPM

## 2018-02-15 DIAGNOSIS — I10 HTN, GOAL BELOW 140/90: ICD-10-CM

## 2018-02-15 DIAGNOSIS — E87.1 HYPONATREMIA: Primary | ICD-10-CM

## 2018-02-15 DIAGNOSIS — Z17.0 MALIGNANT NEOPLASM OF UPPER-OUTER QUADRANT OF RIGHT BREAST IN FEMALE, ESTROGEN RECEPTOR POSITIVE (HCC): ICD-10-CM

## 2018-02-15 DIAGNOSIS — C50.411 MALIGNANT NEOPLASM OF UPPER-OUTER QUADRANT OF RIGHT BREAST IN FEMALE, ESTROGEN RECEPTOR POSITIVE (HCC): ICD-10-CM

## 2018-02-15 RX ORDER — RALOXIFENE HYDROCHLORIDE 60 MG/1
60 TABLET, FILM COATED ORAL DAILY
COMMUNITY
Start: 2017-12-31 | End: 2018-08-31

## 2018-02-15 RX ORDER — OLMESARTAN MEDOXOMIL 20 MG/1
TABLET ORAL
Qty: 30 TAB | Refills: 5 | Status: SHIPPED | OUTPATIENT
Start: 2018-02-15 | End: 2018-03-26 | Stop reason: DRUGHIGH

## 2018-02-15 RX ORDER — METHYLPREDNISOLONE 4 MG/1
TABLET ORAL
COMMUNITY
Start: 2017-12-26 | End: 2018-02-15 | Stop reason: ALTCHOICE

## 2018-02-15 RX ORDER — CHLORTHALIDONE 25 MG/1
TABLET ORAL
COMMUNITY
Start: 2017-12-31 | End: 2018-02-15 | Stop reason: SINTOL

## 2018-02-15 NOTE — PATIENT INSTRUCTIONS
Hyponatremia: Care Instructions  Your Care Instructions    Hyponatremia (say \"xw-wy-hwd-TREE-jose luis-uh\") means that you don't have enough sodium in your blood. It can cause nausea, vomiting, and headaches. Or you may not feel hungry. In serious cases, it can cause seizures, a coma, or even death. Hyponatremia is not a disease. It is a problem caused by something else, such as medicines or exercising for a long time in hot weather. You can get hyponatremia if you lose a lot of fluids and then you drink a lot of water or other liquids that don't have much sodium. You can also get it if you have kidney, liver, heart, or other health problems. Treatment is focused on getting your sodium levels back to normal.  Follow-up care is a key part of your treatment and safety. Be sure to make and go to all appointments, and call your doctor if you are having problems. It's also a good idea to know your test results and keep a list of the medicines you take. How can you care for yourself at home? · If your doctor recommends it, drink fluids that have sodium. Sports drinks are a good choice. Or you can eat salty foods. · If your doctor recommends it, limit the amount of water you drink. And limit fluids that are mostly water. These include tea, coffee, and juice. · Take your medicines exactly as prescribed. Call your doctor if you have any problems with your medicine. · Get your sodium levels tested when your doctor tells you to. When should you call for help? Call 911 anytime you think you may need emergency care. For example, call if:  ? · You have a seizure. ? · You passed out (lost consciousness). ?Call your doctor now or seek immediate medical care if:  ? · You are confused or it is hard to focus. ? · You have little or no appetite. ? · You feel sick to your stomach or you vomit. ? · You have a headache. ? · You have mood changes. ? · You feel more tired than usual.   ? Watch closely for changes in your health, and be sure to contact your doctor if:  ? · You do not get better as expected. Where can you learn more? Go to http://ivy-yoandy.info/. Enter T480 in the search box to learn more about \"Hyponatremia: Care Instructions. \"  Current as of: October 14, 2016  Content Version: 11.4  © 6965-6616 [employer]. Care instructions adapted under license by [employer] (which disclaims liability or warranty for this information). If you have questions about a medical condition or this instruction, always ask your healthcare professional. [place] [address] any warranty or liability for your use of this information.

## 2018-02-15 NOTE — MR AVS SNAPSHOT
[address] 
[phone] Patient: Oz Richard 
MRN: [id number]:[id number] Visit Information Date & Time Provider Department Dept. Phone Encounter #  
 2/15/2018  1:45 PM rAnol Ruvalcaba  [facility] [phone] [id number] Follow-up Instructions Return if symptoms worsen or fail to improve. Upcoming Health Maintenance Date Due  
 EYE EXAM RETINAL OR DILATED Q1 12/27/2017 HEMOGLOBIN A1C Q6M 1/5/2018 LIPID PANEL Q1 7/5/2018 FOOT EXAM Q1 7/7/2018 MICROALBUMIN Q1 7/7/2018 MEDICARE YEARLY EXAM 7/8/2018 GLAUCOMA SCREENING Q2Y 12/27/2018 Bone Densitometry 6/1/2019 BREAST CANCER SCRN MAMMOGRAM 8/3/2019 COLONOSCOPY 6/5/2022 DTaP/Tdap/Td series (2 - Td) 4/1/2024 Allergies as of 2/15/2018  Review Complete On: 2/15/2018 By: Andrea Mcdowell LPN Severity Noted Reaction Type Reactions Pcn [Penicillins] High 03/03/2010    Itching Lips itching Femara [Letrozole]  03/03/2010    Other (comments)  
 htn Current Immunizations  Reviewed on 2/7/2018 Name Date Influenza High Dose Vaccine PF 10/5/2016, 9/28/2015, 9/18/2014 Influenza Vaccine 8/25/2013 Influenza Vaccine Whole 9/1/2011 Pneumococcal Polysaccharide (PPSV-23) 9/18/2014 Tdap 4/1/2014 ZZZ-RETIRED (DO NOT USE) Pneumococcal Vaccine (Unspecified Type) 12/14/2011 Zoster 11/2/2012 Not reviewed this visit You Were Diagnosed With   
  
 Codes Comments Hyponatremia    -  Primary ICD-10-CM: E87.1 ICD-9-CM: 276.1 Malignant neoplasm of upper-outer quadrant of right breast in female, estrogen receptor positive ([facility] [address]     ICD-10-CM: C50.411, Z17.0 ICD-9-CM: 174.4, V86.0   
 HTN, goal below 140/90     ICD-10-CM: I10 
ICD-9-CM: 401.9 Vitals BP Pulse Temp Resp Height(growth percentile) Weight(growth percentile) 118/60 (BP 1 Location: Left arm, BP Patient Position: Sitting) 86 98.8 °F (37.1 °C) (Oral) 18 5' 1.8\" (1.57 m) 172 lb (78 kg) SpO2 BMI OB Status Smoking Status 94% 31.66 kg/m2 Postmenopausal Never Smoker Vitals History BMI and BSA Data Body Mass Index Body Surface Area  
 31.66 kg/m 2 1.84 m 2 Preferred Pharmacy Pharmacy Name Phone [address] [facility], [address] [place] [address] [address] Jeremias Coe Your Updated Medication List  
  
   
This list is accurate as of: 2/15/18  2:32 PM.  Always use your most recent med list.  
  
  
  
  
 albuterol 90 mcg/actuation inhaler Commonly known as:  PROVENTIL HFA, VENTOLIN HFA, PROAIR HFA Take 2 Puffs by inhalation every six (6) hours as needed for Wheezing. atorvastatin 40 mg tablet Commonly known as:  LIPITOR  
TAKE 1 TABLET BY MOUTH  DAILY  
  
 calcium-cholecalciferol (D3) tablet Commonly known as:  CALTRATE 600+D Take 1 Tab by mouth daily. chlorthalidone 25 mg tablet Commonly known as:  HYGROTEN  
  
 metFORMIN 500 mg tablet Commonly known as:  GLUCOPHAGE  
TAKE 1 TABLET BY MOUTH TWO  TIMES DAILY WITH MEALS  
  
 methylPREDNISolone 4 mg tablet Commonly known as:  MEDROL DOSEPACK  
  
 multivitamin tablet Commonly known as:  ONE A DAY Take 1 Tab by mouth daily. olmesartan 20 mg tablet Commonly known as:  BENICAR  
TAKE 1 TABLET BY MOUTH  DAILY  
  
 ondansetron 4 mg disintegrating tablet Commonly known as:  ZOFRAN ODT Take 1 Tab by mouth every eight (8) hours as needed for Nausea. PRILOSEC OTC PO Take 1 Tab by mouth daily. raloxifene 60 mg tablet Commonly known as:  EVISTA  
  
 valACYclovir 500 mg tablet Commonly known as:  VALTREX  
TAKE 1 TABLET BY MOUTH  DAILY Prescriptions Printed Refills  
 olmesartan (BENICAR) 20 mg tablet 5 Sig: TAKE 1 TABLET BY MOUTH  DAILY Class: Print Follow-up Instructions Return if symptoms worsen or fail to improve. To-Do List   
 02/15/2018 Imaging:  PET/CT TUMOR IMAGE SKULL THIGH (SUB) Patient Instructions Hyponatremia: Care Instructions Your Care Instructions Hyponatremia (say \"sm-cx-yea-TREE-jose luis-uh\") means that you don't have enough sodium in your blood. It can cause nausea, vomiting, and headaches. Or you may not feel hungry. In serious cases, it can cause seizures, a coma, or even death. Hyponatremia is not a disease. It is a problem caused by something else, such as medicines or exercising for a long time in hot weather. You can get hyponatremia if you lose a lot of fluids and then you drink a lot of water or other liquids that don't have much sodium. You can also get it if you have kidney, liver, heart, or other health problems. Treatment is focused on getting your sodium levels back to normal. 
Follow-up care is a key part of your treatment and safety. Be sure to make and go to all appointments, and call your doctor if you are having problems. It's also a good idea to know your test results and keep a list of the medicines you take. How can you care for yourself at home? · If your doctor recommends it, drink fluids that have sodium. Sports drinks are a good choice. Or you can eat salty foods. · If your doctor recommends it, limit the amount of water you drink. And limit fluids that are mostly water. These include tea, coffee, and juice. · Take your medicines exactly as prescribed. Call your doctor if you have any problems with your medicine. · Get your sodium levels tested when your doctor tells you to. When should you call for help? Call 911 anytime you think you may need emergency care. For example, call if: 
? · You have a seizure. ? · You passed out (lost consciousness). ?Call your doctor now or seek immediate medical care if: 
? · You are confused or it is hard to focus. ? · You have little or no appetite. ? · You feel sick to your stomach or you vomit. ? · You have a headache. ? · You have mood changes. ? · You feel more tired than usual. ? Watch closely for changes in your health, and be sure to contact your doctor if: 
? · You do not get better as expected. Where can you learn more? Go to http://ivy-yoandy.info/. Enter V529 in the search box to learn more about \"Hyponatremia: Care Instructions. \" Current as of: October 14, 2016 Content Version: 11.4 © 8350-9111 [employer]. Care instructions adapted under license by [employer] (which disclaims liability or warranty for this information). If you have questions about a medical condition or this instruction, always ask your healthcare professional. [address] any warranty or liability for your use of this information. Introducing [facility]! Dear Clarice Hoff: 
Thank you for requesting a [employer] account. Our records indicate that you already have an active [employer] account. You can access your account anytime at [email or web address] Did you know that you can access your hospital and ER discharge instructions at any time in [employer]? You can also review all of your test results from your hospital stay or ER visit. Additional Information If you have questions, please visit the Frequently Asked Questions section of the [employer] website at [email or web address] Remember, [employer] is NOT to be used for urgent needs. For medical emergencies, dial 911. Now available from your iPhone and Android! Please provide this summary of care documentation to your next provider. Your primary care clinician is listed as Kristel Jimenez. If you have any questions after today's visit, please call [phone].

## 2018-02-15 NOTE — TELEPHONE ENCOUNTER
Discussed with nephrologist Dr. 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 Was seen yesterday in nephrology clinic and stopped her chlorthalidone, although did not suspect that her hyponatremia was secondary to medication side effect. Due to that the severity of her hyponatremia, he recommends a thorough investigation of possible malignancy, especially given her history of breast cancer. Pt reportedly aware of malignancy being a contributing factor. He recommended a more aggressive workup such as PET scan. Patient last seen by Dr. Renita Kruger in June 2017. Recommended yearly follow-up at the time. Per chart review, last colonoscopy was done in 2012; consider sooner follow-up given new SIADH. May consider GYN workup with [facility] given prior use of tamoxifen and Evista which may increase likelihood of uterine cancer. Patient has follow-up with MIRYAM Bah today; will discuss with him the coordination of plan of care.

## 2018-02-15 NOTE — PROGRESS NOTES
Ms. Tolu Baldwin is a 81y.o. year old female, she is seen today for Transition of Care services following a hospital discharge for hyponatremia on 2/8/17. Our office Nurse Navigator performed an outreach to Ms. Julien on 2/9/18 (within 2 business days of discharge) to complete medication reconciliation and a telephonic assessment of her condition. Subjective:  Hospital Follow up  Patient is seen for hospital follow up for hyponatremia. Patient presented to [facility] ED on 2/6/18 for N/V. Sodium was noted to be 110 and potassium 3.2 on intitial evaluation. She was admitted for further treatment. She was treated with slow IV fluid electrolyte replacement. She was transitioned to PO food and ToVaptan. Chlorthalidone was discontinued. Patient was discharged home and had follow up with nephrology, Dr. Kayla Durbin on 2/14/18. Specialist contacted PCP and expressed concern for SIADH s/t malignancy. Patient has a h/o right breast cancer and a more aggressive work up was recommended including PET scan to rule out malignancy. Follow up with oncology also recommended. Last colonoscopy in 2012. Prior to Admission medications    Medication Sig Start Date End Date Taking? Authorizing Provider   raloxifene (EVISTA) 60 mg tablet  12/31/17  Yes Historical Provider   olmesartan (BENICAR) 20 mg tablet TAKE 1 TABLET BY MOUTH  DAILY 2/15/18  Yes John Araiza NP   atorvastatin (LIPITOR) 40 mg tablet TAKE 1 TABLET BY MOUTH  DAILY 1/18/18  Yes Kristel Jimenez MD   metFORMIN (GLUCOPHAGE) 500 mg tablet TAKE 1 TABLET BY MOUTH TWO  TIMES DAILY WITH MEALS 1/3/18  Yes Aakash Martinez MD   valACYclovir (VALTREX) 500 mg tablet TAKE 1 TABLET BY MOUTH  DAILY 1/3/18  Yes Kristel Jimenez MD   albuterol (PROVENTIL HFA, VENTOLIN HFA, PROAIR HFA) 90 mcg/actuation inhaler Take 2 Puffs by inhalation every six (6) hours as needed for Wheezing.  12/26/17  Yes Ivan May MD   calcium-cholecalciferol, D3, (CALTRATE 600+D) tablet Take 1 Tab by mouth daily. Yes Historical Provider   multivitamin (ONE A DAY) tablet Take 1 Tab by mouth daily. Yes Historical Provider   OMEPRAZOLE MAGNESIUM (PRILOSEC OTC PO) Take 1 Tab by mouth daily. Yes Historical Provider   chlorthalidone (HYGROTEN) 25 mg tablet  12/31/17   Historical Provider   methylPREDNISolone (MEDROL DOSEPACK) 4 mg tablet  12/26/17   Historical Provider   ondansetron (ZOFRAN ODT) 4 mg disintegrating tablet Take 1 Tab by mouth every eight (8) hours as needed for Nausea. 2/8/18   Melvina Amador MD          Allergies   Allergen Reactions    Pcn [Penicillins] Itching     Lips itching    Femara [Letrozole] Other (comments)     htn           ROS  See HPI    Objective:   Physical Exam   Constitutional: She is oriented to person, place, and time. She appears well-developed and well-nourished. Neck: Normal range of motion. Neck supple. No JVD present. Carotid bruit is not present. No thyromegaly present. Cardiovascular: Normal rate, regular rhythm and intact distal pulses. Exam reveals no gallop and no friction rub. No murmur heard. Pulmonary/Chest: Effort normal and breath sounds normal. No respiratory distress. Musculoskeletal: She exhibits no edema. Lymphadenopathy:     She has no cervical adenopathy. Neurological: She is alert and oriented to person, place, and time. Psychiatric: She has a normal mood and affect. Her behavior is normal.   Nursing note and vitals reviewed. Visit Vitals    /60 (BP 1 Location: Left arm, BP Patient Position: Sitting)    Pulse 86    Temp 98.8 °F (37.1 °C) (Oral)    Resp 18    Ht 5' 1.8\" (1.57 m)    Wt 172 lb (78 kg)    SpO2 94%    BMI 31.66 kg/m2       Assessment & Plan:  Diagnoses and all orders for this visit:    1. Hyponatremia  Managed by nephrology. Labs drawn on 2/14/18. SIADH suspected. -     PET/CT TUMOR IMAGE SKULL THIGH (SUB); Future    2.  Malignant neoplasm of upper-outer quadrant of right breast in female, estrogen receptor positive ([facility] [address]  -     PET/CT TUMOR IMAGE SKULL THIGH (SUB) to r/o metastasis    3. HTN, goal below 140/90  Well controlled. Patient insurance denying Benicar. Has failed on previous ARBs. Will initiate PA for medication. -     olmesartan (BENICAR) 20 mg tablet; TAKE 1 TABLET BY MOUTH  DAILY      I have discussed the diagnosis with the patient and the intended plan as seen in the above orders. The patient has received an after-visit summary along with patient information handout. I have discussed medication side effects and warnings with the patient as well. Follow-up Disposition:  Return if symptoms worsen or fail to improve.         Makenna Long NP

## 2018-02-15 NOTE — PROGRESS NOTES
Chief Complaint   Patient presents with   [facility] Follow Up     2/6/18- [facility]- hyponatremia    Medication Evaluation       1. Have you been to the ER, urgent care clinic since your last visit? Hospitalized since your last visit? Yes-2/6/18- [facility]- hyponatremia     2. Have you seen or consulted any other health care providers outside of the [address] [place] [address] since your last visit? Include any pap smears or colon screening.  Yes- Dr Starr Iraheta- nephrologist- [facility] nephrology- 2/2018

## 2018-02-17 ENCOUNTER — HOSPITAL ENCOUNTER (OUTPATIENT)
Dept: LAB | Age: 73
Discharge: HOME | End: 2018-02-17
Payer: MEDICARE

## 2018-02-17 ENCOUNTER — OFFICE VISIT (OUTPATIENT)
Dept: FAMILY MEDICINE CLINIC | Age: 73
End: 2018-02-17

## 2018-02-17 VITALS
RESPIRATION RATE: 16 BRPM | TEMPERATURE: 98.6 F | HEIGHT: 62 IN | SYSTOLIC BLOOD PRESSURE: 130 MMHG | OXYGEN SATURATION: 97 % | DIASTOLIC BLOOD PRESSURE: 83 MMHG | HEART RATE: 90 BPM | BODY MASS INDEX: 31.28 KG/M2 | WEIGHT: 170 LBS

## 2018-02-17 DIAGNOSIS — J11.1 INFLUENZA: Primary | ICD-10-CM

## 2018-02-17 DIAGNOSIS — I10 HTN, GOAL BELOW 140/90: ICD-10-CM

## 2018-02-17 DIAGNOSIS — E11.8 CONTROLLED TYPE 2 DIABETES MELLITUS WITH COMPLICATION, WITHOUT LONG-TERM CURRENT USE OF INSULIN (HCC): ICD-10-CM

## 2018-02-17 DIAGNOSIS — E66.9 OBESITY, CLASS I, BMI 30-34.9: ICD-10-CM

## 2018-02-17 PROCEDURE — 80053 COMPREHEN METABOLIC PANEL: CPT

## 2018-02-17 PROCEDURE — 36415 COLL VENOUS BLD VENIPUNCTURE: CPT

## 2018-02-17 PROCEDURE — 83036 HEMOGLOBIN GLYCOSYLATED A1C: CPT

## 2018-02-17 RX ORDER — OSELTAMIVIR PHOSPHATE 75 MG/1
75 CAPSULE ORAL 2 TIMES DAILY
Qty: 10 CAP | Refills: 0 | Status: SHIPPED | OUTPATIENT
Start: 2018-02-17 | End: 2018-02-22

## 2018-02-17 RX ORDER — ONDANSETRON 4 MG/1
4 TABLET, ORALLY DISINTEGRATING ORAL
COMMUNITY
Start: 2018-02-08 | End: 2018-08-31

## 2018-02-17 NOTE — PROGRESS NOTES
HISTORY OF PRESENT ILLNESS  Wilbert Lawson is a 81 y.o. female. Blood pressure 130/83, pulse 90, temperature 98.6 °F (37 °C), temperature source Oral, resp. rate 16, height 5' 1.8\" (1.57 m), weight 170 lb (77.1 kg), SpO2 97 %. Body mass index is 31.29 kg/(m^2). Chief Complaint   Patient presents with    Flu     c/o body aches, ? fever, headache, cough with clear mucus         HPI  Wilbert Lawson 81 y.o. female  presents to the office today for possible flu. Flu: Pt was seen in office on 12/26/17 for bronchitis and notes that she still has a cough. She reports that she started having a headache and body aches 1 day ago. She states that she has ear aches. Pt reports that she has had a scratchy throat for several days. 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 She denies fever, chills, nausea, blurred vision, and headaches. Discussed with pt that we will start her on Tamiflu 75mg BID for 5 days. Hypertension: Bp at office today 130/83. Pt continues with Benicar 20mg daily. Bp control stable, continue current regimen. Health maintenance: Discussed with pt that she should work on her diet and exercise to get her weight down. Advised pt that aquatic therapy can help with her weight as well as help her joints recover. Current Outpatient Prescriptions   Medication Sig Dispense Refill    ondansetron (ZOFRAN ODT) 4 mg disintegrating tablet       oseltamivir (TAMIFLU) 75 mg capsule Take 1 Cap by mouth two (2) times a day for 5 days.  10 Cap 0    olmesartan (BENICAR) 20 mg tablet TAKE 1 TABLET BY MOUTH  DAILY 30 Tab 5    atorvastatin (LIPITOR) 40 mg tablet TAKE 1 TABLET BY MOUTH  DAILY 90 Tab 0    metFORMIN (GLUCOPHAGE) 500 mg tablet TAKE 1 TABLET BY MOUTH TWO  TIMES DAILY WITH MEALS 180 Tab 1    valACYclovir (VALTREX) 500 mg tablet TAKE 1 TABLET BY MOUTH  DAILY 90 Tab 1    albuterol (PROVENTIL HFA, VENTOLIN HFA, PROAIR HFA) 90 mcg/actuation inhaler Take 2 Puffs by inhalation every six (6) hours as needed for Wheezing. 1 Inhaler 0    calcium-cholecalciferol, D3, (CALTRATE 600+D) tablet Take 1 Tab by mouth daily.  multivitamin (ONE A DAY) tablet Take 1 Tab by mouth daily.  OMEPRAZOLE MAGNESIUM (PRILOSEC OTC PO) Take 1 Tab by mouth daily.  raloxifene (EVISTA) 60 mg tablet        Allergies   Allergen Reactions    Pcn [Penicillins] Itching     Lips itching    Femara [Letrozole] Other (comments)     htn     Past Medical History:   Diagnosis Date    Breast cancer ([facility] [address] 2003    lumpectomy + radiation right breast    Diabetes mellitus, controlled ([facility] [address] 9/3/2015    Encounter for screening mammogram for breast cancer 08/03/2017    Dr. Shashi Nielsen - normal - repeat in one year    Genital herpes 3/10/2010    GERD (gastroesophageal reflux disease)     HTN, goal below 140/90 3/8/2010    Hyperlipemia 11/26/2014    Osteopenia 3/10/2010     Past Surgical History:   Procedure Laterality Date    ENDOSCOPY, COLON, DIAGNOSTIC  2000    HX APPENDECTOMY      HX BREAST LUMPECTOMY      R breast - lumpectomy + radiation - on arimidex x 5 years - Dr. Mortimer Parma HX LITHOTRIPSY  2006    kidney stone    HX ORTHOPAEDIC  [id number]    bilat hips replaced - needs keflex prior to dental procedure    HX ORTHOPAEDIC      bilateral foot fusion of great hallux. hammer toe    HX TONSILLECTOMY       Family History   Problem Relation Age of Onset    Arthritis-osteo Mother     Stroke Father     Arthritis-osteo Maternal Grandmother      Social History   Substance Use Topics    Smoking status: Never Smoker    Smokeless tobacco: Never Used    Alcohol use Yes      Comment: red wine 7/wk        Review of Systems   Constitutional: Negative. Negative for chills, fever and malaise/fatigue. HENT: Positive for ear pain. Negative for sinus pain and sore throat. Eyes: Negative for blurred vision.    Respiratory: Positive for cough and sputum production (clear). Negative for shortness of breath. Cardiovascular: Negative for chest pain and leg swelling. Gastrointestinal: Negative for nausea. Musculoskeletal: Positive for myalgias. Neurological: Positive for headaches. Negative for dizziness. All other systems reviewed and are negative. Physical Exam   Constitutional: She is oriented to person, place, and time. She appears well-developed and well-nourished. No distress. HENT:   Head: Normocephalic and atraumatic. Right Ear: Hearing, tympanic membrane, external ear and ear canal normal.   Left Ear: Hearing, tympanic membrane, external ear and ear canal normal.   Mouth/Throat: Posterior oropharyngeal erythema present. Neck: Carotid bruit is not present. Cardiovascular: Normal rate, regular rhythm, normal heart sounds and intact distal pulses. Exam reveals no gallop and no friction rub. No murmur heard. Pulmonary/Chest: Effort normal and breath sounds normal. No respiratory distress. She has no wheezes. She has no rales. Musculoskeletal: She exhibits no edema. Lymphadenopathy:     She has no cervical adenopathy. Neurological: She is alert and oriented to person, place, and time. Skin: She is not diaphoretic. Psychiatric: She has a normal mood and affect. Her behavior is normal. Judgment and thought content normal.   Nursing note and vitals reviewed. ASSESSMENT and PLAN  Diagnoses and all orders for this visit:    1. Influenza  -     oseltamivir (TAMIFLU) 75 mg capsule; Take 1 Cap by mouth two (2) times a day for 5 days. - Discussed with pt that we will start her on Tamiflu 75mg BID for 5 days. 2. Obesity, Class I, BMI 30-34.9        - I have reviewed/discussed the above normal BMI with the patient. I have recommended the following interventions: dietary management education, guidance, and counseling, encourage exercise and monitor weight . 3.  Controlled type 2 diabetes mellitus with complication, without long-term current use of insulin (HCC)  -     METABOLIC PANEL, COMPREHENSIVE  -     HEMOGLOBIN A1C WITH EAG  - Presumed stable, will assess levels today. 4. HTN, goal below 140/90         - Bp at office today 130/83. Pt continues with Benicar 20mg daily. Bp control stable, continue current regimen. Follow-up Disposition:  Return in about 3 months (around 5/17/2018) for diabetes follow up. Medication risks/benefits/costs/interactions/alternatives discussed with patient. Advised patient to call back or return to office if symptoms worsen/change/persist.  If patient cannot reach us or should anything more severe/urgent arise he/she should proceed directly to the nearest emergency department. Discussed expected course/resolution/complications of diagnosis in detail with patient. Patient given a written after visit summary which includes her diagnoses, current medications and vitals. Patient expressed understanding with the diagnosis and plan. Written by delmis Stone, as dictated by Maryse Arnold M.D.  .. hsc

## 2018-02-17 NOTE — MR AVS SNAPSHOT
[address] [place] 13 
[phone] Patient: Robert Patel 
MRN: [id number] [facility]:92/6/3615 Visit Information Date & Time Provider Department Dept. Phone Encounter #  
 2/17/2018  8:30 AM Shawnee Peoples MD [address] [place] [address] [phone] [id number] Follow-up Instructions Return in about 3 months (around 5/17/2018) for diabetes follow up. Upcoming Health Maintenance Date Due  
 EYE EXAM RETINAL OR DILATED Q1 12/27/2017 HEMOGLOBIN A1C Q6M 1/5/2018 LIPID PANEL Q1 7/5/2018 FOOT EXAM Q1 7/7/2018 MICROALBUMIN Q1 7/7/2018 MEDICARE YEARLY EXAM 7/8/2018 GLAUCOMA SCREENING Q2Y 12/27/2018 Bone Densitometry 6/1/2019 BREAST CANCER SCRN MAMMOGRAM 8/3/2019 COLONOSCOPY 6/5/2022 DTaP/Tdap/Td series (2 - Td) 4/1/2024 Allergies as of 2/17/2018  Review Complete On: 2/17/2018 By: Shawnee Peoples MD  
  
 Severity Noted Reaction Type Reactions Pcn [Penicillins] High 03/03/2010    Itching Lips itching Femara [Letrozole]  03/03/2010    Other (comments)  
 htn Current Immunizations  Reviewed on 2/7/2018 Name Date Influenza High Dose Vaccine PF 10/5/2016, 9/28/2015, 9/18/2014 Influenza Vaccine 8/25/2013 Influenza Vaccine Whole 9/1/2011 Pneumococcal Polysaccharide (PPSV-23) 9/18/2014 Tdap 4/1/2014 ZZZ-RETIRED (DO NOT USE) Pneumococcal Vaccine (Unspecified Type) 12/14/2011 Zoster 11/2/2012 Not reviewed this visit You Were Diagnosed With   
  
 Codes Comments Influenza    -  Primary ICD-10-CM: J11.1 ICD-9-CM: 454.0 Obesity, Class I, BMI 30-34.9     ICD-10-CM: E66.9 ICD-9-CM: 278.00 Controlled type 2 diabetes mellitus with complication, without long-term current use of insulin (HCC)     ICD-10-CM: E11.8 ICD-9-CM: 250.90   
 HTN, goal below 140/90     ICD-10-CM: I10 
ICD-9-CM: 401.9 Vitals BP Pulse Temp Resp Height(growth percentile) Weight(growth percentile) 130/83 (BP 1 Location: Left arm, BP Patient Position: Sitting) 90 98.6 °F (37 °C) (Oral) 16 5' 1.8\" (1.57 m) 170 lb (77.1 kg) SpO2 BMI OB Status Smoking Status 97% 31.29 kg/m2 Postmenopausal Never Smoker Vitals History BMI and BSA Data Body Mass Index Body Surface Area  
 31.29 kg/m 2 1.83 m 2 Preferred Pharmacy Pharmacy Name Phone [facility], [address] [place] [address] [address] Jeremias Coe Your Updated Medication List  
  
   
This list is accurate as of: 2/17/18  8:30 AM.  Always use your most recent med list.  
  
  
  
  
 albuterol 90 mcg/actuation inhaler Commonly known as:  PROVENTIL HFA, VENTOLIN HFA, PROAIR HFA Take 2 Puffs by inhalation every six (6) hours as needed for Wheezing. atorvastatin 40 mg tablet Commonly known as:  LIPITOR  
TAKE 1 TABLET BY MOUTH  DAILY  
  
 calcium-cholecalciferol (D3) tablet Commonly known as:  CALTRATE 600+D Take 1 Tab by mouth daily. metFORMIN 500 mg tablet Commonly known as:  GLUCOPHAGE  
TAKE 1 TABLET BY MOUTH TWO  TIMES DAILY WITH MEALS  
  
 multivitamin tablet Commonly known as:  ONE A DAY Take 1 Tab by mouth daily. olmesartan 20 mg tablet Commonly known as:  BENICAR  
TAKE 1 TABLET BY MOUTH  DAILY  
  
 ondansetron 4 mg disintegrating tablet Commonly known as:  ZOFRAN ODT  
  
 oseltamivir 75 mg capsule Commonly known as:  TAMIFLU Take 1 Cap by mouth two (2) times a day for 5 days. PRILOSEC OTC PO Take 1 Tab by mouth daily. raloxifene 60 mg tablet Commonly known as:  EVISTA  
  
 valACYclovir 500 mg tablet Commonly known as:  VALTREX  
TAKE 1 TABLET BY MOUTH  DAILY Prescriptions Printed Refills  
 oseltamivir (TAMIFLU) 75 mg capsule 0 Sig: Take 1 Cap by mouth two (2) times a day for 5 days. Class: Print  Route: Oral  
  
 We Performed the Following HEMOGLOBIN A1C WITH EAG [42443 CPT(R)] METABOLIC PANEL, COMPREHENSIVE [37481 CPT(R)] Follow-up Instructions Return in about 3 months (around 5/17/2018) for diabetes follow up. Patient Instructions Body Mass Index: Care Instructions Your Care Instructions Body mass index (BMI) can help you see if your weight is raising your risk for health problems. It uses a formula to compare how much you weigh with how tall you are. · A BMI lower than 18.5 is considered underweight. · A BMI between 18.5 and 24.9 is considered healthy. · A BMI between 25 and 29.9 is considered overweight. A BMI of 30 or higher is considered obese. If your BMI is in the normal range, it means that you have a lower risk for weight-related health problems. If your BMI is in the overweight or obese range, you may be at increased risk for weight-related health problems, such as high blood pressure, heart disease, stroke, arthritis or joint pain, and diabetes. If your BMI is in the underweight range, you may be at increased risk for health problems such as fatigue, lower protection (immunity) against illness, muscle loss, bone loss, hair loss, and hormone problems. BMI is just one measure of your risk for weight-related health problems. You may be at higher risk for health problems if you are not active, you eat an unhealthy diet, or you drink too much alcohol or use tobacco products. Follow-up care is a key part of your treatment and safety. Be sure to make and go to all appointments, and call your doctor if you are having problems. It's also a good idea to know your test results and keep a list of the medicines you take. How can you care for yourself at home? · Practice healthy eating habits. This includes eating plenty of fruits, vegetables, whole grains, lean protein, and low-fat dairy. · If your doctor recommends it, get more exercise.  Walking is a good choice. Bit by bit, increase the amount you walk every day. Try for at least 30 minutes on most days of the week. · Do not smoke. Smoking can increase your risk for health problems. If you need help quitting, talk to your doctor about stop-smoking programs and medicines. These can increase your chances of quitting for good. · Limit alcohol to 2 drinks a day for men and 1 drink a day for women. Too much alcohol can cause health problems. If you have a BMI higher than 25 · Your doctor may do other tests to check your risk for weight-related health problems. This may include measuring the distance around your waist. A waist measurement of more than 40 inches in men or 35 inches in women can increase the risk of weight-related health problems. · Talk with your doctor about steps you can take to stay healthy or improve your health. You may need to make lifestyle changes to lose weight and stay healthy, such as changing your diet and getting regular exercise. If you have a BMI lower than 18.5 · Your doctor may do other tests to check your risk for health problems. · Talk with your doctor about steps you can take to stay healthy or improve your health. You may need to make lifestyle changes to gain or maintain weight and stay healthy, such as getting more healthy foods in your diet and doing exercises to build muscle. Where can you learn more? Go to http://ivy-yoandy.info/. Enter S176 in the search box to learn more about \"Body Mass Index: Care Instructions. \" Current as of: October 13, 2016 Content Version: 11.4 © 1863-9979 Healthwise, [employer]. Care instructions adapted under license by [employer] (which disclaims liability or warranty for this information). If you have questions about a medical condition or this instruction, always ask your healthcare professional. [place] [address] any warranty or liability for your use of this information. Introducing [facility]! Dear Mohan Mendoza: 
Thank you for requesting a [employer] account. Our records indicate that you already have an active [employer] account. You can access your account anytime at [email or web address] Did you know that you can access your hospital and ER discharge instructions at any time in [employer]? You can also review all of your test results from your hospital stay or ER visit. Additional Information If you have questions, please visit the Frequently Asked Questions section of the [employer] website at [email or web address] Remember, [employer] is NOT to be used for urgent needs. For medical emergencies, dial 911. Now available from your iPhone and Android! Please provide this summary of care documentation to your next provider. Your primary care clinician is listed as Kristel Jimenez. If you have any questions after today's visit, please call [phone].

## 2018-02-17 NOTE — PATIENT INSTRUCTIONS
Body Mass Index: Care Instructions  Your Care Instructions    Body mass index (BMI) can help you see if your weight is raising your risk for health problems. It uses a formula to compare how much you weigh with how tall you are. · A BMI lower than 18.5 is considered underweight. · A BMI between 18.5 and 24.9 is considered healthy. · A BMI between 25 and 29.9 is considered overweight. A BMI of 30 or higher is considered obese. If your BMI is in the normal range, it means that you have a lower risk for weight-related health problems. If your BMI is in the overweight or obese range, you may be at increased risk for weight-related health problems, such as high blood pressure, heart disease, stroke, arthritis or joint pain, and diabetes. If your BMI is in the underweight range, you may be at increased risk for health problems such as fatigue, lower protection (immunity) against illness, muscle loss, bone loss, hair loss, and hormone problems. BMI is just one measure of your risk for weight-related health problems. You may be at higher risk for health problems if you are not active, you eat an unhealthy diet, or you drink too much alcohol or use tobacco products. Follow-up care is a key part of your treatment and safety. Be sure to make and go to all appointments, and call your doctor if you are having problems. It's also a good idea to know your test results and keep a list of the medicines you take. How can you care for yourself at home? · Practice healthy eating habits. This includes eating plenty of fruits, vegetables, whole grains, lean protein, and low-fat dairy. · If your doctor recommends it, get more exercise. Walking is a good choice. Bit by bit, increase the amount you walk every day. Try for at least 30 minutes on most days of the week. · Do not smoke. Smoking can increase your risk for health problems. If you need help quitting, talk to your doctor about stop-smoking programs and medicines. These can increase your chances of quitting for good. · Limit alcohol to 2 drinks a day for men and 1 drink a day for women. Too much alcohol can cause health problems. If you have a BMI higher than 25  · Your doctor may do other tests to check your risk for weight-related health problems. This may include measuring the distance around your waist. A waist measurement of more than 40 inches in men or 35 inches in women can increase the risk of weight-related health problems. · Talk with your doctor about steps you can take to stay healthy or improve your health. You may need to make lifestyle changes to lose weight and stay healthy, such as changing your diet and getting regular exercise. If you have a BMI lower than 18.5  · Your doctor may do other tests to check your risk for health problems. · Talk with your doctor about steps you can take to stay healthy or improve your health. You may need to make lifestyle changes to gain or maintain weight and stay healthy, such as getting more healthy foods in your diet and doing exercises to build muscle. Where can you learn more? Go to http://ivy-yoandy.info/. Enter S176 in the search box to learn more about \"Body Mass Index: Care Instructions. \"  Current as of: October 13, 2016  Content Version: 11.4  © 4481-7283 Healthwise, Incorporated. Care instructions adapted under license by [employer] (which disclaims liability or warranty for this information). If you have questions about a medical condition or this instruction, always ask your healthcare professional. [address] any warranty or liability for your use of this information.

## 2018-02-17 NOTE — PROGRESS NOTES
Chief Complaint   Patient presents with    Flu     c/o body aches, ? fever, headache, cough with clear mucus        Reviewed Record in preparation for visit and have obtained necessary documentation. Identified pt with two pt identifiers (Name @ )    Health Maintenance Due   Topic    EYE EXAM RETINAL OR DILATED Q1     HEMOGLOBIN A1C Q6M          1. Have you been to the ER, urgent care clinic since your last visit? Hospitalized since your last visit? No    2. Have you seen or consulted any other health care providers outside of the [address] [place] [address] since your last visit? Include any pap smears or colon screening.  No

## 2018-02-18 LAB
ALBUMIN SERPL-MCNC: 4 G/DL (ref 3.5–4.8)
ALBUMIN/GLOB SERPL: 1.7 {RATIO} (ref 1.2–2.2)
ALP SERPL-CCNC: 83 IU/L (ref 39–117)
ALT SERPL-CCNC: 8 IU/L (ref 0–32)
AST SERPL-CCNC: 15 IU/L (ref 0–40)
BILIRUB SERPL-MCNC: 0.4 MG/DL (ref 0–1.2)
BUN SERPL-MCNC: 17 MG/DL (ref 8–27)
BUN/CREAT SERPL: 24 (ref 12–28)
CALCIUM SERPL-MCNC: 9.2 MG/DL (ref 8.7–10.3)
CHLORIDE SERPL-SCNC: 97 MMOL/L (ref 96–106)
CO2 SERPL-SCNC: 23 MMOL/L (ref 18–29)
CREAT SERPL-MCNC: 0.7 MG/DL (ref 0.57–1)
EST. AVERAGE GLUCOSE BLD GHB EST-MCNC: 128 MG/DL
GFR SERPLBLD CREATININE-BSD FMLA CKD-EPI: 102 ML/MIN/1.73
GFR SERPLBLD CREATININE-BSD FMLA CKD-EPI: 88 ML/MIN/1.73
GLOBULIN SER CALC-MCNC: 2.4 G/DL (ref 1.5–4.5)
GLUCOSE SERPL-MCNC: 108 MG/DL (ref 65–99)
HBA1C MFR BLD: 6.1 % (ref 4.8–5.6)
POTASSIUM SERPL-SCNC: 4.6 MMOL/L (ref 3.5–5.2)
PROT SERPL-MCNC: 6.4 G/DL (ref 6–8.5)
SODIUM SERPL-SCNC: 135 MMOL/L (ref 134–144)

## 2018-02-19 NOTE — PROGRESS NOTES
Outgoing call to patient.  verified. Notified patient of recent lab results. Patient inquired about pet scan that was ordered. Gave # to central scheduling. Patient appreciative.

## 2018-02-26 ENCOUNTER — HOSPITAL ENCOUNTER (OUTPATIENT)
Dept: PET IMAGING | Age: 73
Discharge: HOME | End: 2018-02-26
Payer: MEDICARE

## 2018-02-26 VITALS — BODY MASS INDEX: 30.12 KG/M2 | HEIGHT: 63 IN | WEIGHT: 170 LBS

## 2018-02-26 DIAGNOSIS — E87.1 HYPONATREMIA: ICD-10-CM

## 2018-02-26 DIAGNOSIS — C50.411 MALIGNANT NEOPLASM OF UPPER-OUTER QUADRANT OF RIGHT BREAST IN FEMALE, ESTROGEN RECEPTOR POSITIVE (HCC): ICD-10-CM

## 2018-02-26 DIAGNOSIS — Z17.0 MALIGNANT NEOPLASM OF UPPER-OUTER QUADRANT OF RIGHT BREAST IN FEMALE, ESTROGEN RECEPTOR POSITIVE (HCC): ICD-10-CM

## 2018-02-26 PROCEDURE — A9552 F18 FDG: HCPCS

## 2018-02-26 RX ORDER — SODIUM CHLORIDE 0.9 % (FLUSH) 0.9 %
10 SYRINGE (ML) INJECTION
Status: COMPLETED | OUTPATIENT
Start: 2018-02-26 | End: 2018-02-26

## 2018-02-26 RX ADMIN — Medication 10 ML: at 07:05

## 2018-02-26 NOTE — TELEPHONE ENCOUNTER
Benicar denied by patient's insurance. She can continue to pay out of pocket or she can discuss alternative treatment with her PCP.

## 2018-02-27 DIAGNOSIS — J40 BRONCHITIS: ICD-10-CM

## 2018-02-27 RX ORDER — ALBUTEROL SULFATE 90 UG/1
2 AEROSOL, METERED RESPIRATORY (INHALATION)
Qty: 1 INHALER | Refills: 2 | Status: SHIPPED | OUTPATIENT
Start: 2018-02-27

## 2018-02-27 NOTE — PROGRESS NOTES
Discussed PET scan results with patient. No PET evidence of metastatic disease. Recommended repeat CT chest in 6 months for inflammatory findings of lungs.

## 2018-03-08 ENCOUNTER — OFFICE VISIT (OUTPATIENT)
Dept: FAMILY MEDICINE CLINIC | Age: 73
End: 2018-03-08

## 2018-03-08 VITALS
RESPIRATION RATE: 18 BRPM | SYSTOLIC BLOOD PRESSURE: 135 MMHG | DIASTOLIC BLOOD PRESSURE: 98 MMHG | TEMPERATURE: 98.8 F | OXYGEN SATURATION: 96 % | BODY MASS INDEX: 30.69 KG/M2 | HEART RATE: 98 BPM | HEIGHT: 63 IN | WEIGHT: 173.2 LBS

## 2018-03-08 DIAGNOSIS — J40 BRONCHITIS: Primary | ICD-10-CM

## 2018-03-08 RX ORDER — AZITHROMYCIN 250 MG/1
TABLET, FILM COATED ORAL
Qty: 6 TAB | Refills: 0 | Status: SHIPPED | OUTPATIENT
Start: 2018-03-08 | End: 2018-03-13

## 2018-03-08 RX ORDER — BUDESONIDE AND FORMOTEROL FUMARATE DIHYDRATE 160; 4.5 UG/1; UG/1
2 AEROSOL RESPIRATORY (INHALATION) 2 TIMES DAILY
Qty: 1 INHALER | Refills: 5 | Status: SHIPPED | OUTPATIENT
Start: 2018-03-08 | End: 2022-08-02

## 2018-03-08 RX ORDER — AZITHROMYCIN 250 MG/1
TABLET, FILM COATED ORAL
Qty: 6 TAB | Refills: 0 | Status: SHIPPED | OUTPATIENT
Start: 2018-03-08 | End: 2018-03-08 | Stop reason: SDUPTHER

## 2018-03-08 NOTE — PROGRESS NOTES
Chief Complaint   Patient presents with    Headache    Generalized Body Aches     patient was hopsitalized for flu sx.  Cough     clear productive cough      1. Have you been to the ER, urgent care clinic since your last visit? Hospitalized since your last visit? NO     2. Have you seen or consulted any other health care providers outside of the [address] [place] [address] since your last visit? Include any pap smears or colon screening.  No

## 2018-03-08 NOTE — PROGRESS NOTES
[facility] Note    Celine Santos is a 81 y.o. female who was seen in clinic today (3/9/2018). Subjective:  Upper Respiratory Infection  Patient complains of symptoms of a URI. Symptoms include congestion and cough. Onset of symptoms was 2 weeks ago, unchanged since that time. She also c/o achiness, congestion, cough described as non-productive, with wheezing and headache described as throbbing for the past 2 weeks . She is drinking plenty of fluids. . Evaluation to date: none. Treatment to date: cough suppressants. Prior to Admission medications    Medication Sig Start Date End Date Taking? Authorizing Provider   azithromycin (ZITHROMAX) 250 mg tablet Take 2 tablets today, then take 1 tablet daily 3/8/18 3/13/18 Yes Germain Pennington NP   budesonide-formoterol (SYMBICORT) 160-4.5 mcg/actuation HFAA Take 2 Puffs by inhalation two (2) times a day. 3/8/18  Yes Germain Pennington NP   albuterol (PROVENTIL HFA, VENTOLIN HFA, PROAIR HFA) 90 mcg/actuation inhaler Take 2 Puffs by inhalation every six (6) hours as needed for Wheezing. 2/27/18  Yes Germain Pennington NP   olmesartan (BENICAR) 20 mg tablet TAKE 1 TABLET BY MOUTH  DAILY 2/15/18  Yes Germain Pennington NP   atorvastatin (LIPITOR) 40 mg tablet TAKE 1 TABLET BY MOUTH  DAILY 1/18/18  Yes Kristel Jimenez MD   metFORMIN (GLUCOPHAGE) 500 mg tablet TAKE 1 TABLET BY MOUTH TWO  TIMES DAILY WITH MEALS 1/3/18  Yes Nader Mccurdy MD   valACYclovir (VALTREX) 500 mg tablet TAKE 1 TABLET BY MOUTH  DAILY 1/3/18  Yes Kristel Jimenez MD   calcium-cholecalciferol, D3, (CALTRATE 600+D) tablet Take 1 Tab by mouth daily. Yes Historical Provider   multivitamin (ONE A DAY) tablet Take 1 Tab by mouth daily. Yes Historical Provider   OMEPRAZOLE MAGNESIUM (PRILOSEC OTC PO) Take 1 Tab by mouth daily.    Yes Historical Provider   ondansetron (ZOFRAN ODT) 4 mg disintegrating tablet  2/8/18   Historical Provider   raloxifene (EVISTA) 60 mg tablet  12/31/17   Historical Provider          Allergies   Allergen Reactions    Pcn [Penicillins] Itching     Lips itching    Femara [Letrozole] Other (comments)     htn           ROS  See HPI    Objective:   Physical Exam   Constitutional: She is oriented to person, place, and time. She appears well-developed and well-nourished. No distress. HENT:   Right Ear: Tympanic membrane and ear canal normal.   Left Ear: Tympanic membrane and ear canal normal.   Nose: Mucosal edema present. Right sinus exhibits no maxillary sinus tenderness and no frontal sinus tenderness. Left sinus exhibits no maxillary sinus tenderness and no frontal sinus tenderness. Mouth/Throat: Oropharynx is clear and moist.   Cardiovascular: Normal rate, regular rhythm and normal heart sounds. No murmur heard. Pulmonary/Chest: Effort normal and breath sounds normal. She has no decreased breath sounds. She has no wheezes. She has no rhonchi. Lymphadenopathy:     She has no cervical adenopathy. Neurological: She is alert and oriented to person, place, and time. Psychiatric: She has a normal mood and affect. Her behavior is normal.   Nursing note and vitals reviewed. Visit Vitals    BP (!) 135/98 (BP 1 Location: Left arm, BP Patient Position: Sitting)    Pulse 98    Temp 98.8 °F (37.1 °C) (Oral)    Resp 18    Ht 5' 3\" (1.6 m)    Wt 173 lb 3.2 oz (78.6 kg)    SpO2 96%    BMI 30.68 kg/m2       Assessment & Plan:  Diagnoses and all orders for this visit:    1. Bronchitis  Begin LABA/ICS combo. Cover with Zithromax. CXR deferred by patient today - repeat for unresolved symptoms. -     azithromycin (ZITHROMAX) 250 mg tablet; Take 2 tablets today, then take 1 tablet daily  -     budesonide-formoterol (SYMBICORT) 160-4.5 mcg/actuation HFAA; Take 2 Puffs by inhalation two (2) times a day. I have discussed the diagnosis with the patient and the intended plan as seen in the above orders.   The patient has received an after-visit summary along with patient information handout. I have discussed medication side effects and warnings with the patient as well. Follow-up Disposition:  Return if symptoms worsen or fail to improve.         Germain Pennington NP

## 2018-03-08 NOTE — MR AVS SNAPSHOT
[address] 
[phone] Patient: Cass Vargas 
MRN: [id number] Visit Information Date & Time Provider Department Dept. Phone Encounter #  
 3/8/2018  6:15 PM Vivi Epstein  [facility] [phone] [id number] Upcoming Health Maintenance Date Due  
 LIPID PANEL Q1 7/5/2018 FOOT EXAM Q1 7/7/2018 MICROALBUMIN Q1 7/7/2018 MEDICARE YEARLY EXAM 7/8/2018 HEMOGLOBIN A1C Q6M 8/17/2018 EYE EXAM RETINAL OR DILATED Q1 1/23/2019 Bone Densitometry 6/1/2019 BREAST CANCER SCRN MAMMOGRAM 8/3/2019 GLAUCOMA SCREENING Q2Y 1/23/2020 COLONOSCOPY 6/5/2022 DTaP/Tdap/Td series (2 - Td) 4/1/2024 Allergies as of 3/8/2018  Review Complete On: 3/8/2018 By: Robin Man LPN Severity Noted Reaction Type Reactions Pcn [Penicillins] High 03/03/2010    Itching Lips itching Femara [Letrozole]  03/03/2010    Other (comments)  
 htn Current Immunizations  Reviewed on 2/7/2018 Name Date Influenza High Dose Vaccine PF 10/5/2016, 9/28/2015, 9/18/2014 Influenza Vaccine 8/25/2013 Influenza Vaccine Whole 9/1/2011 Pneumococcal Polysaccharide (PPSV-23) 9/18/2014 Tdap 4/1/2014 ZZZ-RETIRED (DO NOT USE) Pneumococcal Vaccine (Unspecified Type) 12/14/2011 Zoster 11/2/2012 Not reviewed this visit You Were Diagnosed With   
  
 Codes Comments Bronchitis    -  Primary ICD-10-CM: Z43 ICD-9-CM: 581 Vitals BP Pulse Temp Resp Height(growth percentile) Weight(growth percentile) (!) 135/98 (BP 1 Location: Left arm, BP Patient Position: Sitting) 98 98.8 °F (37.1 °C) (Oral) 18 5' 3\" (1.6 m) 173 lb 3.2 oz (78.6 kg) SpO2 BMI OB Status Smoking Status 96% 30.68 kg/m2 Postmenopausal Never Smoker Vitals History BMI and BSA Data  Body Mass Index Body Surface Area  
 30.68 kg/m 2 1.87 m 2  
  
  
 Preferred Pharmacy Pharmacy Name Phone [facility] [address], [address] [place] [address] [phone] Your Updated Medication List  
  
   
This list is accurate as of 3/8/18  6:41 PM.  Always use your most recent med list.  
  
  
  
  
 albuterol 90 mcg/actuation inhaler Commonly known as:  PROVENTIL HFA, VENTOLIN HFA, PROAIR HFA Take 2 Puffs by inhalation every six (6) hours as needed for Wheezing. atorvastatin 40 mg tablet Commonly known as:  LIPITOR  
TAKE 1 TABLET BY MOUTH  DAILY  
  
 azithromycin 250 mg tablet Commonly known as:  Reuben  Take 2 tablets today, then take 1 tablet daily  
  
 budesonide-formoterol 160-4.5 mcg/actuation Hfaa Commonly known as:  SYMBICORT Take 2 Puffs by inhalation two (2) times a day. calcium-cholecalciferol (D3) tablet Commonly known as:  CALTRATE 600+D Take 1 Tab by mouth daily. metFORMIN 500 mg tablet Commonly known as:  GLUCOPHAGE  
TAKE 1 TABLET BY MOUTH TWO  TIMES DAILY WITH MEALS  
  
 multivitamin tablet Commonly known as:  ONE A DAY Take 1 Tab by mouth daily. olmesartan 20 mg tablet Commonly known as:  BENICAR  
TAKE 1 TABLET BY MOUTH  DAILY  
  
 ondansetron 4 mg disintegrating tablet Commonly known as:  ZOFRAN ODT PRILOSEC OTC PO Take 1 Tab by mouth daily. raloxifene 60 mg tablet Commonly known as:  EVISTA  
  
 valACYclovir 500 mg tablet Commonly known as:  VALTREX  
TAKE 1 TABLET BY MOUTH  DAILY Prescriptions Sent to Pharmacy Refills  
 azithromycin (ZITHROMAX) 250 mg tablet 0 Sig: Take 2 tablets today, then take 1 tablet daily Class: Normal  
 Pharmacy: [facility] [address] [place] [address] Ph #: [phone]  
 budesonide-formoterol (SYMBICORT) 160-4.5 mcg/actuation HFAA 5 Sig: Take 2 Puffs by inhalation two (2) times a day.   
 Class: Normal  
 Pharmacy: [address], [address] [place] [address] Ph #: [phone] Route: Inhalation Patient Instructions Bronchitis: Care Instructions Your Care Instructions Bronchitis is inflammation of the bronchial tubes, which carry air to the lungs. The tubes swell and produce mucus, or phlegm. The mucus and inflamed bronchial tubes make you cough. You may have trouble breathing. Most cases of bronchitis are caused by viruses like those that cause colds. Antibiotics usually do not help and they may be harmful. Bronchitis usually develops rapidly and lasts about 2 to 3 weeks in otherwise healthy people. Follow-up care is a key part of your treatment and safety. Be sure to make and go to all appointments, and call your doctor if you are having problems. It's also a good idea to know your test results and keep a list of the medicines you take. How can you care for yourself at home? · Take all medicines exactly as prescribed. Call your doctor if you think you are having a problem with your medicine. · Get some extra rest. 
· Take an over-the-counter pain medicine, such as acetaminophen (Tylenol), ibuprofen (Advil, Motrin), or naproxen (Aleve) to reduce fever and relieve body aches. Read and follow all instructions on the label. · Do not take two or more pain medicines at the same time unless the doctor told you to. Many pain medicines have acetaminophen, which is Tylenol. Too much acetaminophen (Tylenol) can be harmful. · Take an over-the-counter cough medicine that contains dextromethorphan to help quiet a dry, hacking cough so that you can sleep. Avoid cough medicines that have more than one active ingredient. Read and follow all instructions on the label. · Breathe moist air from a humidifier, hot shower, or sink filled with hot water. The heat and moisture will thin mucus so you can cough it out. · Do not smoke. Smoking can make bronchitis worse. If you need help quitting, talk to your doctor about stop-smoking programs and medicines. These can increase your chances of quitting for good. When should you call for help? Call 911 anytime you think you may need emergency care. For example, call if: 
? · You have severe trouble breathing. ?Call your doctor now or seek immediate medical care if: 
? · You have new or worse trouble breathing. ? · You cough up dark brown or bloody mucus (sputum). ? · You have a new or higher fever. ? · You have a new rash. ? Watch closely for changes in your health, and be sure to contact your doctor if: 
? · You cough more deeply or more often, especially if you notice more mucus or a change in the color of your mucus. ? · You are not getting better as expected. Where can you learn more? Go to http://ivy-yoandy.info/. Enter H333 in the search box to learn more about \"Bronchitis: Care Instructions. \" Current as of: May 12, 2017 Content Version: 11.4 © 3801-7250 [employer]. Care instructions adapted under license by [employer] (which disclaims liability or warranty for this information). If you have questions about a medical condition or this instruction, always ask your healthcare professional. [address] any warranty or liability for your use of this information. Introducing [facility] & HEALTH [facility]! Dear Clarice Hoff: 
Thank you for requesting a [employer] account. Our records indicate that you already have an active [employer] account. You can access your account anytime at [email or web address] Did you know that you can access your hospital and ER discharge instructions at any time in [employer]? You can also review all of your test results from your hospital stay or ER visit. Additional Information If you have questions, please visit the Frequently Asked Questions section of the [employer] website at [email or web address] Remember, [employer] is NOT to be used for urgent needs.  For medical emergencies, dial 911. Now available from your iPhone and Android! Please provide this summary of care documentation to your next provider. Your primary care clinician is listed as Kristel Jimenez. If you have any questions after today's visit, please call [phone].

## 2018-03-12 LAB
CREAT BLD-MCNC: 0.66 MG/DL
SODIUM, EXTERNAL: 133

## 2018-03-26 DIAGNOSIS — I10 HTN, GOAL BELOW 140/90: Primary | ICD-10-CM

## 2018-03-26 RX ORDER — OLMESARTAN MEDOXOMIL 40 MG/1
20 TABLET ORAL DAILY
Qty: 30 TAB | Refills: 5 | Status: SHIPPED | OUTPATIENT
Start: 2018-03-26 | End: 2018-07-28 | Stop reason: SDUPTHER

## 2018-04-19 DIAGNOSIS — Z23 ENCOUNTER FOR IMMUNIZATION: Primary | ICD-10-CM

## 2018-05-08 ENCOUNTER — HOSPITAL ENCOUNTER (OUTPATIENT)
Dept: CT IMAGING | Age: 73
Discharge: HOME | End: 2018-05-08
Payer: MEDICARE

## 2018-05-08 DIAGNOSIS — R05.9 COUGH: ICD-10-CM

## 2018-05-08 PROCEDURE — 70486 CT MAXILLOFACIAL W/O DYE: CPT

## 2018-06-05 LAB — SODIUM SERPL-SCNC: 137 MMOL/L

## 2018-07-28 ENCOUNTER — OFFICE VISIT (OUTPATIENT)
Dept: FAMILY MEDICINE CLINIC | Age: 73
End: 2018-07-28

## 2018-07-28 VITALS
WEIGHT: 176.4 LBS | TEMPERATURE: 96.8 F | RESPIRATION RATE: 17 BRPM | OXYGEN SATURATION: 97 % | DIASTOLIC BLOOD PRESSURE: 89 MMHG | BODY MASS INDEX: 31.25 KG/M2 | SYSTOLIC BLOOD PRESSURE: 146 MMHG | HEIGHT: 63 IN | HEART RATE: 83 BPM

## 2018-07-28 DIAGNOSIS — I10 HTN, GOAL BELOW 140/90: ICD-10-CM

## 2018-07-28 RX ORDER — OLMESARTAN MEDOXOMIL 40 MG/1
40 TABLET ORAL DAILY
Qty: 90 TAB | Refills: 3 | Status: SHIPPED | OUTPATIENT
Start: 2018-07-28 | End: 2019-06-17 | Stop reason: SDUPTHER

## 2018-07-28 NOTE — PROGRESS NOTES
Assessment/Plan:     Diagnoses and all orders for this visit:    1. HTN, goal below 140/90  -     olmesartan (BENICAR) 40 mg tablet; Take 1 Tab by mouth daily. For blood pressure  Uncontrolled. Increase Benicar to 40 mg once daily. Would avoid diuretics secondary to her history of hyponatremia however I have encouraged her to reduce her sodium intake since this episode was transient and related to illness. Follow up in one month or sooner as needed. Follow-up Disposition:  Return in about 4 weeks (around 8/25/2018) for Medicare Wellness. Discussed expected course/resolution/complications of diagnosis in detail with patient.    Medication risks/benefits/costs/interactions/alternatives discussed with patient.    Pt was given after visit summary which includes diagnoses, current medications & vitals. Pt expressed understanding with the diagnosis and plan          Subjective:      Cassy Callejas is a 81 y.o. female who presents for had concerns including Blood Pressure Check. Hypertension  Patient is here for follow-up of hypertension. She indicates that she is feeling well and denies any symptoms referable to her hypertension, including chest pain, palpitations, dyspnea, orthopnea, or peripheral edema. Patient has these side effects of medication: headaches. She is not exercising and is not adherent to low salt diet. Blood pressure is not well controlled at home. Use of agents associated with hypertension: none. History of renal disease: no.  Cardiovascular risk factors: obesity, sedentary life style, hypertension, post-menopausal.      Home blood pressures have been [phone] systolic. Chlorthalidone was discontinued during a previous hospitalization secondary to hypo-natremia. She is not currently monitoring sodium intake since her previous hyponatremia incident which was caused by hypovolemia secondary to nausea and vomiting from influenza.       Current Outpatient Prescriptions   Medication Sig Dispense Refill    fish oil/borage/flax/om3,6,9 1 (OMEGA 3-6-9 COMPLEX PO) Take  by mouth. Hemp Oil      TURMERIC, BULK, by Does Not Apply route. 1500mg, 140mg , 10mg      olmesartan (BENICAR) 40 mg tablet Take 1 Tab by mouth daily. For blood pressure 90 Tab 3    budesonide-formoterol (SYMBICORT) 160-4.5 mcg/actuation HFAA Take 2 Puffs by inhalation two (2) times a day. 1 Inhaler 5    albuterol (PROVENTIL HFA, VENTOLIN HFA, PROAIR HFA) 90 mcg/actuation inhaler Take 2 Puffs by inhalation every six (6) hours as needed for Wheezing. 1 Inhaler 2    atorvastatin (LIPITOR) 40 mg tablet TAKE 1 TABLET BY MOUTH  DAILY 90 Tab 0    metFORMIN (GLUCOPHAGE) 500 mg tablet TAKE 1 TABLET BY MOUTH TWO  TIMES DAILY WITH MEALS 180 Tab 1    valACYclovir (VALTREX) 500 mg tablet TAKE 1 TABLET BY MOUTH  DAILY 90 Tab 1    calcium-cholecalciferol, D3, (CALTRATE 600+D) tablet Take 1 Tab by mouth daily.  multivitamin (ONE A DAY) tablet Take 1 Tab by mouth daily.  OMEPRAZOLE MAGNESIUM (PRILOSEC OTC PO) Take 1 Tab by mouth daily.  ondansetron (ZOFRAN ODT) 4 mg disintegrating tablet       raloxifene (EVISTA) 60 mg tablet          Allergies   Allergen Reactions    Pcn [Penicillins] Itching     Lips itching    Femara [Letrozole] Other (comments)     htn       ROS:   Review of Systems   Constitutional: Negative for malaise/fatigue. Eyes: Negative for blurred vision. Respiratory: Negative for shortness of breath. Cardiovascular: Negative for chest pain. Neurological: Positive for headaches. Objective:     Visit Vitals    /89 (BP 1 Location: Left arm, BP Patient Position: Sitting)    Pulse 83    Temp 96.8 °F (36 °C) (Oral)    Resp 17    Ht 5' 3\" (1.6 m)    Wt 176 lb 6.4 oz (80 kg)    SpO2 97%    BMI 31.25 kg/m2       Vitals and Nurse Documentation reviewed. Physical Exam   Constitutional: No distress. Cardiovascular: S1 normal and S2 normal.  Exam reveals no gallop and no friction rub. No murmur heard. Pulmonary/Chest: Breath sounds normal. No respiratory distress. Skin: Skin is warm and dry.    Psychiatric: Mood and affect normal.

## 2018-07-28 NOTE — PATIENT INSTRUCTIONS

## 2018-07-28 NOTE — PROGRESS NOTES
Chief Complaint   Patient presents with    Blood Pressure Check     for 10 days and she's been getting headaches patients been taking Benicar 40 mg off and on within 6 days     Identified pt with two pt identifiers (Name @ )    1. Have you been to the ER, urgent care clinic since your last visit? Hospitalized since your last visit? No    2. Have you seen or consulted any other health care providers outside of the [address] [place] [address] since your last visit? Include any pap smears or colon screening.  Yes [facility] 2018    \"REVIEWED RECORD IN PREPARATION FOR VISIT AND HAVE OBTAINED NECESSARY DOCUMENTATION\"

## 2018-07-28 NOTE — MR AVS SNAPSHOT
[address] 
[phone] Patient: Sunil Ferguson 
MRN: [id number] Visit Information Date & Time Provider Department Dept. Phone Encounter #  
 2018 11:30 AM Kevin Lee  [place] [phone] [id number] Follow-up Instructions Return in about 4 weeks (around 2018) for Medicare Wellness. Upcoming Health Maintenance Date Due  
 LIPID PANEL Q1 2018 FOOT EXAM Q1 2018 MICROALBUMIN Q1 2018 MEDICARE YEARLY EXAM 2018 HEMOGLOBIN A1C Q6M 2018 Influenza Age 5 to Adult 2018 EYE EXAM RETINAL OR DILATED Q1 3/7/2019 Bone Densitometry 2019 BREAST CANCER SCRN MAMMOGRAM 8/3/2019 GLAUCOMA SCREENING Q2Y 3/7/2020 COLONOSCOPY 2022 DTaP/Tdap/Td series (2 - Td) 2024 Allergies as of 2018  Review Complete On: 3/9/2018 By: Stef Trujillo NP Severity Noted Reaction Type Reactions Pcn [Penicillins] High 2010    Itching Lips itching Femara [Letrozole]  2010    Other (comments)  
 htn Current Immunizations  Reviewed on 2018 Name Date Influenza High Dose Vaccine PF 10/5/2016, 2015, 2014 Influenza Vaccine 2013 Influenza Vaccine Whole 2011 Pneumococcal Polysaccharide (PPSV-23) 2014 Tdap 2014 ZZZ-RETIRED (DO NOT USE) Pneumococcal Vaccine (Unspecified Type) 2011 Zoster 2012 Reviewed by Toni Samson LPN on 3/25/4451 at 43:17 AM  
 Reviewed by Toni Samson LPN on  at 70:69 AM  
 Reviewed by Toni Samson LPN on 6153 at 41:33 AM  
You Were Diagnosed With   
  
 Codes Comments HTN, goal below 140/90     ICD-10-CM: I10 
ICD-9-CM: 401.9 Vitals BP Pulse Temp Resp Height(growth percentile) Weight(growth percentile) 146/89 (BP 1 Location: Left arm, BP Patient Position: Sitting) 83 96.8 °F (36 °C) (Oral) 17 5' 3\" (1.6 m) 176 lb 6.4 oz (80 kg) SpO2 BMI OB Status Smoking Status 97% 31.25 kg/m2 Postmenopausal Never Smoker Vitals History BMI and BSA Data Body Mass Index Body Surface Area  
 31.25 kg/m 2 1.89 m 2 Preferred Pharmacy Pharmacy Name Phone [facility] [address] [place] [address], [address] [place] [address] [phone] Your Updated Medication List  
  
   
This list is accurate as of 7/28/18 11:52 AM.  Always use your most recent med list.  
  
  
  
  
 albuterol 90 mcg/actuation inhaler Commonly known as:  PROVENTIL HFA, VENTOLIN HFA, PROAIR HFA Take 2 Puffs by inhalation every six (6) hours as needed for Wheezing. atorvastatin 40 mg tablet Commonly known as:  LIPITOR  
TAKE 1 TABLET BY MOUTH  DAILY  
  
 budesonide-formoterol 160-4.5 mcg/actuation Hfaa Commonly known as:  SYMBICORT Take 2 Puffs by inhalation two (2) times a day. calcium-cholecalciferol (D3) tablet Commonly known as:  CALTRATE 600+D Take 1 Tab by mouth daily. metFORMIN 500 mg tablet Commonly known as:  GLUCOPHAGE  
TAKE 1 TABLET BY MOUTH TWO  TIMES DAILY WITH MEALS  
  
 multivitamin tablet Commonly known as:  ONE A DAY Take 1 Tab by mouth daily. olmesartan 40 mg tablet Commonly known as:  [employer] Take 1 Tab by mouth daily. For blood pressure OMEGA 3-6-9 COMPLEX PO Take  by mouth. Hemp Oil  
  
 ondansetron 4 mg disintegrating tablet Commonly known as:  ZOFRAN ODT PRILOSEC OTC PO Take 1 Tab by mouth daily. raloxifene 60 mg tablet Commonly known as:  EVISTA TURMERIC (BULK)  
by Does Not Apply route. 1500mg, 140mg , 10mg  
  
 valACYclovir 500 mg tablet Commonly known as:  VALTREX  
TAKE 1 TABLET BY MOUTH  DAILY Prescriptions Sent to Pharmacy  Refills  
 olmesartan (BENICAR) 40 mg tablet 3  
 Sig: Take 1 Tab by mouth daily. For blood pressure Class: Normal  
 Pharmacy: [facility] [address], [address] #: [phone] Route: Oral  
  
Follow-up Instructions Return in about 4 weeks (around 8/25/2018) for Medicare Wellness. Patient Instructions DASH Diet: Care Instructions Your Care Instructions The DASH diet is an eating plan that can help lower your blood pressure. DASH stands for Dietary Approaches to Stop Hypertension. Hypertension is high blood pressure. The DASH diet focuses on eating foods that are high in calcium, potassium, and magnesium. These nutrients can lower blood pressure. The foods that are highest in these nutrients are fruits, vegetables, low-fat dairy products, nuts, seeds, and legumes. But taking calcium, potassium, and magnesium supplements instead of eating foods that are high in those nutrients does not have the same effect. The DASH diet also includes whole grains, fish, and poultry. The DASH diet is one of several lifestyle changes your doctor may recommend to lower your high blood pressure. Your doctor may also want you to decrease the amount of sodium in your diet. Lowering sodium while following the DASH diet can lower blood pressure even further than just the DASH diet alone. Follow-up care is a key part of your treatment and safety. Be sure to make and go to all appointments, and call your doctor if you are having problems. It's also a good idea to know your test results and keep a list of the medicines you take. How can you care for yourself at home? Following the DASH diet · Eat 4 to 5 servings of fruit each day. A serving is 1 medium-sized piece of fruit, ½ cup chopped or canned fruit, 1/4 cup dried fruit, or 4 ounces (½ cup) of fruit juice. Choose fruit more often than fruit juice. · Eat 4 to 5 servings of vegetables each day.  A serving is 1 cup of lettuce or raw leafy vegetables, ½ cup of chopped or cooked vegetables, or 4 ounces (½ cup) of vegetable juice. Choose vegetables more often than vegetable juice. · Get 2 to 3 servings of low-fat and fat-free dairy each day. A serving is 8 ounces of milk, 1 cup of yogurt, or 1 ½ ounces of cheese. · Eat 6 to 8 servings of grains each day. A serving is 1 slice of bread, 1 ounce of dry cereal, or ½ cup of cooked rice, pasta, or cooked cereal. Try to choose whole-grain products as much as possible. · Limit lean meat, poultry, and fish to 2 servings each day. A serving is 3 ounces, about the size of a deck of cards. · Eat 4 to 5 servings of nuts, seeds, and legumes (cooked dried beans, lentils, and split peas) each week. A serving is 1/3 cup of nuts, 2 tablespoons of seeds, or ½ cup of cooked beans or peas. · Limit fats and oils to 2 to 3 servings each day. A serving is 1 teaspoon of vegetable oil or 2 tablespoons of salad dressing. · Limit sweets and added sugars to 5 servings or less a week. A serving is 1 tablespoon jelly or jam, ½ cup sorbet, or 1 cup of lemonade. · Eat less than 2,300 milligrams (mg) of sodium a day. If you limit your sodium to 1,500 mg a day, you can lower your blood pressure even more. Tips for success · Start small. Do not try to make dramatic changes to your diet all at once. You might feel that you are missing out on your favorite foods and then be more likely to not follow the plan. Make small changes, and stick with them. Once those changes become habit, add a few more changes. · Try some of the following: ¨ Make it a goal to eat a fruit or vegetable at every meal and at snacks. This will make it easy to get the recommended amount of fruits and vegetables each day. ¨ Try yogurt topped with fruit and nuts for a snack or healthy dessert. ¨ Add lettuce, tomato, cucumber, and onion to sandwiches.  
¨ Combine a ready-made pizza crust with low-fat mozzarella cheese and lots of vegetable toppings. Try using tomatoes, squash, spinach, broccoli, carrots, cauliflower, and onions. ¨ Have a variety of cut-up vegetables with a low-fat dip as an appetizer instead of chips and dip. ¨ Sprinkle sunflower seeds or chopped almonds over salads. Or try adding chopped walnuts or almonds to cooked vegetables. ¨ Try some vegetarian meals using beans and peas. Add garbanzo or kidney beans to salads. Make burritos and tacos with mashed hall beans or black beans. Where can you learn more? Go to http://ivy-yoandy.info/. Enter Z504 in the search box to learn more about \"DASH Diet: Care Instructions. \" Current as of: December 6, 2017 Content Version: 11.7 © 9985-7964 [employer]. Care instructions adapted under license by [employer] (which disclaims liability or warranty for this information). If you have questions about a medical condition or this instruction, always ask your healthcare professional. [place] [address] any warranty or liability for your use of this information. Introducing [facility]! Dear [place]: 
Thank you for requesting a [employer] account. Our records indicate that you already have an active [employer] account. You can access your account anytime at [email or web address] Did you know that you can access your hospital and ER discharge instructions at any time in [employer]? You can also review all of your test results from your hospital stay or ER visit. Additional Information If you have questions, please visit the Frequently Asked Questions section of the [employer] website at [email or web address] Remember, [employer] is NOT to be used for urgent needs. For medical emergencies, dial 911. Now available from your iPhone and Android! Please provide this summary of care documentation to your next provider. Your primary care clinician is listed as Kristel Jimenez. If you have any questions after today's visit, please call [phone].

## 2018-08-31 ENCOUNTER — HOSPITAL ENCOUNTER (OUTPATIENT)
Dept: LAB | Age: 73
Discharge: HOME | End: 2018-08-31
Payer: MEDICARE

## 2018-08-31 ENCOUNTER — OFFICE VISIT (OUTPATIENT)
Dept: FAMILY MEDICINE CLINIC | Age: 73
End: 2018-08-31

## 2018-08-31 VITALS
WEIGHT: 176.4 LBS | RESPIRATION RATE: 18 BRPM | HEIGHT: 63 IN | HEART RATE: 88 BPM | SYSTOLIC BLOOD PRESSURE: 130 MMHG | TEMPERATURE: 98.7 F | BODY MASS INDEX: 31.25 KG/M2 | DIASTOLIC BLOOD PRESSURE: 80 MMHG | OXYGEN SATURATION: 97 %

## 2018-08-31 DIAGNOSIS — Z13.31 SCREENING FOR DEPRESSION: ICD-10-CM

## 2018-08-31 DIAGNOSIS — I10 HTN, GOAL BELOW 140/90: ICD-10-CM

## 2018-08-31 DIAGNOSIS — Z00.00 MEDICARE ANNUAL WELLNESS VISIT, SUBSEQUENT: ICD-10-CM

## 2018-08-31 DIAGNOSIS — Z13.39 SCREENING FOR ALCOHOLISM: ICD-10-CM

## 2018-08-31 DIAGNOSIS — E78.5 HYPERLIPIDEMIA, UNSPECIFIED HYPERLIPIDEMIA TYPE: ICD-10-CM

## 2018-08-31 DIAGNOSIS — E11.8 CONTROLLED TYPE 2 DIABETES MELLITUS WITH COMPLICATION, WITHOUT LONG-TERM CURRENT USE OF INSULIN (HCC): ICD-10-CM

## 2018-08-31 PROCEDURE — 80053 COMPREHEN METABOLIC PANEL: CPT

## 2018-08-31 PROCEDURE — 82043 UR ALBUMIN QUANTITATIVE: CPT

## 2018-08-31 PROCEDURE — 80061 LIPID PANEL: CPT

## 2018-08-31 PROCEDURE — 83036 HEMOGLOBIN GLYCOSYLATED A1C: CPT

## 2018-08-31 PROCEDURE — 36415 COLL VENOUS BLD VENIPUNCTURE: CPT

## 2018-08-31 RX ORDER — TIOTROPIUM BROMIDE INHALATION SPRAY 3.12 UG/1
SPRAY, METERED RESPIRATORY (INHALATION) AS NEEDED
COMMUNITY
Start: 2018-06-26 | End: 2022-08-02

## 2018-08-31 NOTE — PATIENT INSTRUCTIONS
Medicare Wellness Visit, Female     The best way to live healthy is to have a lifestyle where you eat a well-balanced diet, exercise regularly, limit alcohol use, and quit all forms of tobacco/nicotine, if applicable. Regular preventive services are another way to keep healthy. Preventive services (vaccines, screening tests, monitoring & exams) can help personalize your care plan, which helps you manage your own care. Screening tests can find health problems at the earliest stages, when they are easiest to treat. Cosme Bautista follows the current, evidence-based guidelines published by the [facility] ([facility]) when recommending preventive services for our patients. Because we follow these guidelines, sometimes recommendations change over time as research supports it. (For example, mammograms used to be recommended annually. Even though Medicare will still pay for an annual mammogram, the newer guidelines recommend a mammogram every two years for women of average risk.)  Of course, you and your doctor may decide to screen more often for some diseases, based on your risk and your health status. Preventive services for you include:  - Medicare offers their members a free annual wellness visit, which is time for you and your primary care provider to discuss and plan for your preventive service needs. Take advantage of this benefit every year!  -All adults over the age of 72 should receive the recommended pneumonia vaccines. Current USPSTF guidelines recommend a series of two vaccines for the best pneumonia protection.   -All adults should have a flu vaccine yearly and a tetanus vaccine every 10 years. All adults age 61 and older should receive a shingles vaccine once in their lifetime.    -A bone mass density test is recommended when a woman turns 65 to screen for osteoporosis. This test is only recommended one time, as a screening.  Some providers will use this same test as a disease monitoring tool if you already have osteoporosis. -All adults age 38-68 who are overweight should have a diabetes screening test once every three years.   -Other screening tests and preventive services for persons with diabetes include: an eye exam to screen for diabetic retinopathy, a kidney function test, a foot exam, and stricter control over your cholesterol.   -Cardiovascular screening for adults with routine risk involves an electrocardiogram (ECG) at intervals determined by your doctor.   -Colorectal cancer screenings should be done for adults age 54-65 with no increased risk factors for colorectal cancer. There are a number of acceptable methods of screening for this type of cancer. Each test has its own benefits and drawbacks. Discuss with your doctor what is most appropriate for you during your annual wellness visit. The different tests include: colonoscopy (considered the best screening method), a fecal occult blood test, a fecal DNA test, and sigmoidoscopy. -Breast cancer screenings are recommended every other year for women of normal risk, age 54-69.  -Cervical cancer screenings for women over age 72 are only recommended with certain risk factors.   -All adults born between [facility] should be screened once for Hepatitis C.      Here is a list of your current Health Maintenance items (your personalized list of preventive services) with a due date:  Health Maintenance Due   Topic Date Due    Cholesterol Test   07/05/2018    Diabetic Foot Care  07/07/2018    Albumin Urine Test  07/07/2018    Annual Well Visit  07/08/2018    Flu Vaccine  08/01/2018    Hemoglobin A1C    08/17/2018

## 2018-08-31 NOTE — PROGRESS NOTES
Patient Name: Deandra Allred   MRN: [id number]    Alfredo Damian is a 81 y.o. female who presents with the following:     She is seen for diabetes, hypertension and hyperlipidemia. Since last visit she reports no chest pain, dyspnea or TIA's, no numbness, tingling or pain in extremities, no unusual visual symptoms, no hypoglycemia, no significant changes. Home glucose monitoring: is not performed. She reports medication compliance: compliant all of the time. Medication side effects: none. Diabetic diet compliance: compliant all of the time. Lab review: orders written for new lab studies as appropriate; see orders. Eye exam: UTD. Since last OV, increased olmesartan to 40 mg daily for additional BP control. Lab Results   Component Value Date/Time    Hemoglobin A1c 6.1 (H) 02/17/2018 08:36 AM    Hemoglobin A1c (POC) 5.6 04/07/2016 08:42 AM       BP Readings from Last 3 Encounters:   08/31/18 130/80   07/28/18 146/89   03/08/18 (!) 135/98       Review of Systems   Constitutional: Negative for fever, malaise/fatigue and weight loss. Respiratory: Negative for cough, hemoptysis, shortness of breath and wheezing. Cardiovascular: Negative for chest pain, palpitations, leg swelling and PND. Gastrointestinal: Negative for abdominal pain, constipation, diarrhea, nausea and vomiting. The patient's medications, allergies, past medical history, surgical history, family history and social history were reviewed and updated where appropriate. Prior to Admission medications    Medication Sig Start Date End Date Taking?  Authorizing Provider   atorvastatin (LIPITOR) 40 mg tablet TAKE 1 TABLET BY MOUTH  DAILY 8/7/18  Yes Kristel Jimenez MD   metFORMIN (GLUCOPHAGE) 500 mg tablet TAKE 1 TABLET BY MOUTH TWO  TIMES DAILY WITH MEALS 8/7/18  Yes Kim Irvin MD   valACYclovir (VALTREX) 500 mg tablet TAKE 1 TABLET BY MOUTH  DAILY 8/7/18  Yes Kim Irvin MD   fish oil/borage/flax/om3,6,9 1 (Kd Mario 3-6-9 COMPLEX PO) Take  by mouth. Hemp Oil   Yes Historical Provider   TURMERIC, BULK, by Does Not Apply route. 1500mg, 140mg , 10mg   Yes Historical Provider   olmesartan (BENICAR) 40 mg tablet Take 1 Tab by mouth daily. For blood pressure 7/28/18  Yes Rolanda Doran NP   budesonide-formoterol (SYMBICORT) 160-4.5 mcg/actuation HFAA Take 2 Puffs by inhalation two (2) times a day. 3/8/18  Yes Dony Bullock NP   albuterol (PROVENTIL HFA, VENTOLIN HFA, PROAIR HFA) 90 mcg/actuation inhaler Take 2 Puffs by inhalation every six (6) hours as needed for Wheezing. 2/27/18  Yes Dony Bullock NP   calcium-cholecalciferol, D3, (CALTRATE 600+D) tablet Take 1 Tab by mouth daily. Yes Historical Provider   multivitamin (ONE A DAY) tablet Take 1 Tab by mouth daily. Yes Historical Provider   OMEPRAZOLE MAGNESIUM (PRILOSEC OTC PO) Take 1 Tab by mouth daily. Yes Historical Provider   SPIRIVA RESPIMAT 2.5 mcg/actuation inhaler  6/26/18   Historical Provider   ondansetron (ZOFRAN ODT) 4 mg disintegrating tablet Take 4 mg by mouth every eight (8) hours as needed. 2/8/18   Historical Provider   raloxifene (EVISTA) 60 mg tablet Take 60 mg by mouth daily. 12/31/17   Historical Provider       Allergies   Allergen Reactions    Pcn [Penicillins] Itching     Lips itching    Femara [Letrozole] Other (comments)     htn           OBJECTIVE    Visit Vitals    /80 (BP 1 Location: Left arm, BP Patient Position: Sitting)    Pulse 88    Temp 98.7 °F (37.1 °C) (Oral)    Resp 18    Ht 5' 2.75\" (1.594 m)    Wt 176 lb 6.4 oz (80 kg)    SpO2 97%    BMI 31.5 kg/m2       Physical Exam   Constitutional: She is oriented to person, place, and time and well-developed, well-nourished, and in no distress. No distress. Eyes: Conjunctivae and EOM are normal. Pupils are equal, round, and reactive to light. Cardiovascular: Normal rate, regular rhythm and normal heart sounds. Exam reveals no gallop and no friction rub.     No murmur heard.  Pulmonary/Chest: Effort normal and breath sounds normal. No respiratory distress. She has no wheezes. Musculoskeletal: Normal range of motion. She exhibits no edema, tenderness or deformity. Neurological: She is alert and oriented to person, place, and time. Diabetic foot exam:     Left Foot:   Visual Exam: normal    Pulse DP: 2+ (normal)   Filament test: normal sensation    Vibratory sensation: normal      Right Foot:   Visual Exam: normal    Pulse DP: 2+ (normal)   Filament test: normal sensation    Vibratory sensation: normal   Skin: Skin is warm and dry. No rash noted. She is not diaphoretic. No erythema. Psychiatric: Mood, memory, affect and judgment normal.   Nursing note and vitals reviewed. ASSESSMENT AND PLAN  Nj Almonte is a 81 y.o. female who presents today for:    1. Medicare annual wellness visit, subsequent  See other note    2. Screening for depression  - Depression Screen Annual    3. Screening for alcoholism  - Annual  Alcohol Screen 15 min ()    4. HTN, goal below 140/90  Stable, continue current treatment. 5. Hyperlipidemia, unspecified hyperlipidemia type  Will calculate ASCVD risk score pending labs. - METABOLIC PANEL, COMPREHENSIVE  - LIPID PANEL    6. Controlled type 2 diabetes mellitus with complication, without long-term current use of insulin (HCC)  Stable, continue current treatment pending review of labs. I have reviewed/discussed the above normal BMI with the patient.   I have recommended the following interventions: dietary management education, guidance, and counseling, encourage exercise, monitor weight and prescribed dietary intake.   - HEMOGLOBIN A1C WITH EAG  - MICROALBUMIN, UR, RAND W/ MICROALB/CREAT RATIO  -  DIABETES FOOT EXAM       Medications Discontinued During This Encounter   Medication Reason    raloxifene (EVISTA) 60 mg tablet Not A Current Medication    ondansetron (ZOFRAN ODT) 4 mg disintegrating tablet Not A Current Medication Follow-up Disposition:  Return in about 6 months (around 2/28/2019) for HTN follow up. Medication risks/benefits/costs/interactions/alternatives discussed with patient. Advised patient to call back or return to office if symptoms worsen/change/persist. If patient cannot reach us or should anything more severe/urgent arise he/she should proceed directly to the nearest emergency department. Discussed expected course/resolution/complications of diagnosis in detail with patient. Patient given a written after visit summary which includes his/her diagnoses, current medications and vitals. Patient expressed understanding with the diagnosis and plan.      Diana Calix M.D.

## 2018-08-31 NOTE — MR AVS SNAPSHOT
[address] 
[phone] Patient: Frieda Leal 
MRN: [id number] Visit Information Date & Time Provider Department Dept. Phone Encounter #  
 8/31/2018  8:30 AM Alyssia Joyce  [facility] [phone] [id number] Follow-up Instructions Return in about 6 months (around 2/28/2019) for HTN follow up. Upcoming Health Maintenance Date Due  
 LIPID PANEL Q1 7/5/2018 FOOT EXAM Q1 7/7/2018 MICROALBUMIN Q1 7/7/2018 Influenza Age 5 to Adult 8/1/2018 HEMOGLOBIN A1C Q6M 8/17/2018 EYE EXAM RETINAL OR DILATED Q1 3/7/2019 Bone Densitometry 6/1/2019 BREAST CANCER SCRN MAMMOGRAM 8/3/2019 MEDICARE YEARLY EXAM 9/1/2019 GLAUCOMA SCREENING Q2Y 3/7/2020 COLONOSCOPY 6/5/2022 DTaP/Tdap/Td series (2 - Td) 4/1/2024 Allergies as of 8/31/2018  Review Complete On: 8/31/2018 By: Alyssia Joyce MD  
  
 Severity Noted Reaction Type Reactions Pcn [Penicillins] High 03/03/2010    Itching Lips itching Femara [Letrozole]  03/03/2010    Other (comments)  
 htn Current Immunizations  Reviewed on 7/28/2018 Name Date Influenza High Dose Vaccine PF 10/5/2016, 9/28/2015, 9/18/2014 Influenza Vaccine 8/25/2013 Influenza Vaccine Whole 9/1/2011 Pneumococcal Polysaccharide (PPSV-23) 9/18/2014 Tdap 4/1/2014 ZZZ-RETIRED (DO NOT USE) Pneumococcal Vaccine (Unspecified Type) 12/14/2011 Zoster 11/2/2012 Not reviewed this visit You Were Diagnosed With   
  
 Codes Comments Medicare annual wellness visit, subsequent     ICD-10-CM: Z00.00 ICD-9-CM: V70.0 Screening for depression     ICD-10-CM: Z13.89 ICD-9-CM: V79.0 Screening for alcoholism     ICD-10-CM: Z13.89 ICD-9-CM: V79.1 HTN, goal below 140/90     ICD-10-CM: I10 
ICD-9-CM: 401.9 Hyperlipidemia, unspecified hyperlipidemia type     ICD-10-CM: E78.5 ICD-9-CM: 272.4 Controlled type 2 diabetes mellitus with complication, without long-term current use of insulin (HCC)     ICD-10-CM: E11.8 ICD-9-CM: 250.90 Vitals BP Pulse Temp Resp Height(growth percentile) Weight(growth percentile) 130/80 (BP 1 Location: Left arm, BP Patient Position: Sitting) 88 98.7 °F (37.1 °C) (Oral) 18 5' 2.75\" (1.594 m) 176 lb 6.4 oz (80 kg) SpO2 BMI OB Status Smoking Status 97% 31.5 kg/m2 Postmenopausal Never Smoker BMI and BSA Data Body Mass Index Body Surface Area 31.5 kg/m 2 1.88 m 2 Preferred Pharmacy Pharmacy Name Phone [facility] [address] , [address] [place] [address] [phone] Your Updated Medication List  
  
   
This list is accurate as of 8/31/18  8:55 AM.  Always use your most recent med list.  
  
  
  
  
 albuterol 90 mcg/actuation inhaler Commonly known as:  PROVENTIL HFA, VENTOLIN HFA, PROAIR HFA Take 2 Puffs by inhalation every six (6) hours as needed for Wheezing. atorvastatin 40 mg tablet Commonly known as:  LIPITOR  
TAKE 1 TABLET BY MOUTH  DAILY  
  
 budesonide-formoterol 160-4.5 mcg/actuation Hfaa Commonly known as:  SYMBICORT Take 2 Puffs by inhalation two (2) times a day. calcium-cholecalciferol (D3) tablet Commonly known as:  CALTRATE 600+D Take 1 Tab by mouth daily. metFORMIN 500 mg tablet Commonly known as:  GLUCOPHAGE  
TAKE 1 TABLET BY MOUTH TWO  TIMES DAILY WITH MEALS  
  
 multivitamin tablet Commonly known as:  ONE A DAY Take 1 Tab by mouth daily. olmesartan 40 mg tablet Commonly known as:  [employer] Take 1 Tab by mouth daily. For blood pressure OMEGA 3-6-9 COMPLEX PO Take  by mouth. Hemp Oil PRILOSEC OTC PO Take 1 Tab by mouth daily. SPIRIVA RESPIMAT 2.5 mcg/actuation inhaler Generic drug:  tiotropium bromide TURMERIC (BULK)  
by Does Not Apply route. 1500mg, 140mg , 10mg  
  
 valACYclovir 500 mg tablet Commonly known as:  VALTREX  
TAKE 1 TABLET BY MOUTH  DAILY We Performed the Following [address] [[id number] [facility] [place]] HEMOGLOBIN A1C WITH EAG [18689 CPT(R)]  DIABETES FOOT EXAM [HM7 Custom] LIPID PANEL [05674 CPT(R)] METABOLIC PANEL, COMPREHENSIVE [18319 CPT(R)] MICROALBUMIN, UR, RAND W/ MICROALB/CREAT RATIO [id number] CPT(R)] DC ANNUAL ALCOHOL SCREEN 15 MIN [id number] [facility] [place] [facility]] Follow-up Instructions Return in about 6 months (around 2/28/2019) for HTN follow up. Patient Instructions Medicare Wellness Visit, Female The best way to live healthy is to have a lifestyle where you eat a well-balanced diet, exercise regularly, limit alcohol use, and quit all forms of tobacco/nicotine, if applicable. Regular preventive services are another way to keep healthy. Preventive services (vaccines, screening tests, monitoring & exams) can help personalize your care plan, which helps you manage your own care. Screening tests can find health problems at the earliest stages, when they are easiest to treat. Cosme Bautista follows the current, evidence-based guidelines published by the [facility] ([facility]F) when recommending preventive services for our patients. Because we follow these guidelines, sometimes recommendations change over time as research supports it. (For example, mammograms used to be recommended annually. Even though Medicare will still pay for an annual mammogram, the newer guidelines recommend a mammogram every two years for women of average risk.) Of course, you and your doctor may decide to screen more often for some diseases, based on your risk and your health status. Preventive services for you include: - Medicare offers their members a free annual wellness visit, which is time for you and your primary care provider to discuss and plan for your preventive service needs. Take advantage of this benefit every year! 
-All adults over the age of 72 should receive the recommended pneumonia vaccines. Current USPSTF guidelines recommend a series of two vaccines for the best pneumonia protection.  
-All adults should have a flu vaccine yearly and a tetanus vaccine every 10 years. All adults age 61 and older should receive a shingles vaccine once in their lifetime.   
-A bone mass density test is recommended when a woman turns 65 to screen for osteoporosis. This test is only recommended one time, as a screening. Some providers will use this same test as a disease monitoring tool if you already have osteoporosis. -All adults age 38-68 who are overweight should have a diabetes screening test once every three years.  
-Other screening tests and preventive services for persons with diabetes include: an eye exam to screen for diabetic retinopathy, a kidney function test, a foot exam, and stricter control over your cholesterol.  
-Cardiovascular screening for adults with routine risk involves an electrocardiogram (ECG) at intervals determined by your doctor.  
-Colorectal cancer screenings should be done for adults age 54-65 with no increased risk factors for colorectal cancer. There are a number of acceptable methods of screening for this type of cancer. Each test has its own benefits and drawbacks. Discuss with your doctor what is most appropriate for you during your annual wellness visit. The different tests include: colonoscopy (considered the best screening method), a fecal occult blood test, a fecal DNA test, and sigmoidoscopy.  
-Breast cancer screenings are recommended every other year for women of normal risk, age 54-69. 
-Cervical cancer screenings for women over age 72 are only recommended with certain risk factors.  
-All adults born between [facility] should be screened once for Hepatitis C.  
 
 Here is a list of your current Health Maintenance items (your personalized list of preventive services) with a due date: 
Health Maintenance Due Topic Date Due  Cholesterol Test   07/05/2018 [facility] Diabetic Foot Care  07/07/2018  Albumin Urine Test  07/07/2018 [facility] Annual Well Visit  07/08/2018  Flu Vaccine  08/01/2018  Hemoglobin A1C    08/17/2018 Introducing [facility] SERVICES! Dear [facility] - MINOR LOBATO: 
Thank you for requesting a [email or web address] account. Our records indicate that you already have an active [email or web address] account. You can access your account anytime at [email or web address] Did you know that you can access your hospital and ER discharge instructions at any time in [email or web address]? You can also review all of your test results from your hospital stay or ER visit. Additional Information If you have questions, please visit the Frequently Asked Questions section of the [email or web address] website at [email or web address] Remember, [email or web address] is NOT to be used for urgent needs. For medical emergencies, dial 911. Now available from your iPhone and Android! Please provide this summary of care documentation to your next provider. Your primary care clinician is listed as Kristel Jimenez. If you have any questions after today's visit, please call [phone].

## 2018-08-31 NOTE — PROGRESS NOTES
This is the Subsequent Medicare Annual Wellness Exam, performed 12 months or more after the Initial AWV or the last Subsequent AWV    I have reviewed the patient's medical history in detail and updated the computerized patient record. History     Past Medical History:   Diagnosis Date    Breast cancer ([facility] [address] 2003    lumpectomy + radiation right breast    Diabetes mellitus, controlled ([facility] [address] 9/3/2015    Encounter for screening mammogram for breast cancer 08/03/2017    Dr. Erika Wheat - normal - repeat in one year    Genital herpes 3/10/2010    GERD (gastroesophageal reflux disease)     HTN, goal below 140/90 3/8/2010    Hyperlipemia 11/26/2014    Osteopenia 3/10/2010      Past Surgical History:   Procedure Laterality Date    ENDOSCOPY, COLON, DIAGNOSTIC  2000    HX APPENDECTOMY      HX BREAST LUMPECTOMY      R breast - lumpectomy + radiation - on arimidex x 5 years - Dr. Carlos Akhtar HX LITHOTRIPSY  2006    kidney stone    HX ORTHOPAEDIC  [id number]    bilat hips replaced - needs keflex prior to dental procedure    HX ORTHOPAEDIC      bilateral foot fusion of great hallux. hammer toe    HX TONSILLECTOMY       Current Outpatient Prescriptions   Medication Sig Dispense Refill    atorvastatin (LIPITOR) 40 mg tablet TAKE 1 TABLET BY MOUTH  DAILY 90 Tab 1    metFORMIN (GLUCOPHAGE) 500 mg tablet TAKE 1 TABLET BY MOUTH TWO  TIMES DAILY WITH MEALS 180 Tab 1    valACYclovir (VALTREX) 500 mg tablet TAKE 1 TABLET BY MOUTH  DAILY 90 Tab 1    fish oil/borage/flax/om3,6,9 1 (OMEGA 3-6-9 COMPLEX PO) Take  by mouth. Hemp Oil      TURMERIC, BULK, by Does Not Apply route. 1500mg, 140mg , 10mg      olmesartan (BENICAR) 40 mg tablet Take 1 Tab by mouth daily. For blood pressure 90 Tab 3    budesonide-formoterol (SYMBICORT) 160-4.5 mcg/actuation HFAA Take 2 Puffs by inhalation two (2) times a day.  1 Inhaler 5    albuterol (PROVENTIL HFA, VENTOLIN HFA, PROAIR HFA) 90 mcg/actuation inhaler Take 2 Puffs by inhalation every six (6) hours as needed for Wheezing. 1 Inhaler 2    calcium-cholecalciferol, D3, (CALTRATE 600+D) tablet Take 1 Tab by mouth daily.  multivitamin (ONE A DAY) tablet Take 1 Tab by mouth daily.  OMEPRAZOLE MAGNESIUM (PRILOSEC OTC PO) Take 1 Tab by mouth daily.  SPIRIVA RESPIMAT 2.5 mcg/actuation inhaler        Allergies   Allergen Reactions    Pcn [Penicillins] Itching     Lips itching    Femara [Letrozole] Other (comments)     htn     Family History   Problem Relation Age of Onset    Arthritis-osteo Mother     Stroke Father     Arthritis-osteo Maternal Grandmother      Social History   Substance Use Topics    Smoking status: Never Smoker    Smokeless tobacco: Never Used    Alcohol use Yes      Comment: red wine 7/wk     Patient Active Problem List   Diagnosis Code    HTN, goal below 140/90 I10    Osteopenia M85.80    Genital herpes A60.00    Hyperlipemia E78.5    Diabetes mellitus, controlled ([facility] [address] E11.9    Breast cancer ([facility] [address] C50.919    GERD (gastroesophageal reflux disease) K21.9    Malignant neoplasm of right female breast ([facility] [address] C50.911    Obesity, Class I, BMI 30-34.9 E66.9       Depression Risk Factor Screening:     PHQ over the last two weeks 7/28/2018   Little interest or pleasure in doing things Not at all   Feeling down, depressed, irritable, or hopeless Not at all   Total Score PHQ 2 0     Alcohol Risk Factor Screening: On any occasion in the past three months you have had more than 3 drinks containing alcohol. Functional Ability and Level of Safety:   Hearing Loss  Hearing is good. Activities of Daily Living  The home contains: no safety equipment. Patient does total self care    Fall Risk  Fall Risk Assessment, last 12 mths 8/31/2018   Able to walk? Yes   Fall in past 12 months? Yes   Fall with injury?  Yes   Number of falls in past 12 months 1   Fall Risk Score 2       Abuse Screen  Patient is not abused    Cognitive Screening   Evaluation of Cognitive Function:  Has your family/caregiver stated any concerns about your memory: no      Patient Care Team   Patient Care Team:  Griselda Miller MD as PCP - General (Family Practice)  Sandra Jones, RN as Ambulatory Care Navigator    Assessment/Plan   Education and counseling provided:  Are appropriate based on today's review and evaluation    Diagnoses and all orders for this visit:    1. Medicare annual wellness visit, subsequent    2. Screening for depression  -     Depression Screen Annual    3. Screening for alcoholism  -     Annual  Alcohol Screen 15 min ()    4. HTN, goal below 140/90    5. Hyperlipidemia, unspecified hyperlipidemia type  -     METABOLIC PANEL, COMPREHENSIVE  -     LIPID PANEL    6.  Controlled type 2 diabetes mellitus with complication, without long-term current use of insulin (HCC)  -     HEMOGLOBIN A1C WITH EAG  -     MICROALBUMIN, UR, RAND W/ MICROALB/CREAT RATIO  -      DIABETES FOOT EXAM        Health Maintenance Due   Topic Date Due    LIPID PANEL Q1  07/05/2018    MICROALBUMIN Q1  07/07/2018    Influenza Age 9 to Adult  08/01/2018    HEMOGLOBIN A1C Q6M  08/17/2018

## 2018-08-31 NOTE — ACP (ADVANCE CARE PLANNING)
Advance Care Planning:   Patient was offered the opportunity to discuss advance care planning:  yes     Does patient have an Advance Directive:  yes   If no, did you provide information on [facility]?   Pt to provide copy

## 2018-08-31 NOTE — PROGRESS NOTES
Chief Complaint   Patient presents with   [address] [place] [address] Annual Wellness Visit         Hypertension     follow up     1. Have you been to the ER, urgent care clinic since your last visit? Hospitalized since your last visit? No    2. Have you seen or consulted any other health care providers outside of the [address] [place] [address] since your last visit? Include any pap smears or colon screening. No   Patient stated that she saw an ENT and allergy doctor about 1 month ago due to a cough.

## 2018-09-01 LAB
ALBUMIN SERPL-MCNC: 4.3 G/DL (ref 3.5–4.8)
ALBUMIN/CREAT UR: 6.8 MG/G CREAT (ref 0–30)
ALBUMIN/GLOB SERPL: 2 {RATIO} (ref 1.2–2.2)
ALP SERPL-CCNC: 90 IU/L (ref 39–117)
ALT SERPL-CCNC: 7 IU/L (ref 0–32)
AST SERPL-CCNC: 17 IU/L (ref 0–40)
BILIRUB SERPL-MCNC: 0.3 MG/DL (ref 0–1.2)
BUN SERPL-MCNC: 18 MG/DL (ref 8–27)
BUN/CREAT SERPL: 25 (ref 12–28)
CALCIUM SERPL-MCNC: 9 MG/DL (ref 8.7–10.3)
CHLORIDE SERPL-SCNC: 104 MMOL/L (ref 96–106)
CHOLEST SERPL-MCNC: 154 MG/DL (ref 100–199)
CO2 SERPL-SCNC: 24 MMOL/L (ref 20–29)
CREAT SERPL-MCNC: 0.71 MG/DL (ref 0.57–1)
CREAT UR-MCNC: 88.1 MG/DL
EST. AVERAGE GLUCOSE BLD GHB EST-MCNC: 134 MG/DL
GLOBULIN SER CALC-MCNC: 2.1 G/DL (ref 1.5–4.5)
GLUCOSE SERPL-MCNC: 105 MG/DL (ref 65–99)
HBA1C MFR BLD: 6.3 % (ref 4.8–5.6)
HDLC SERPL-MCNC: 57 MG/DL
INTERPRETATION, 910389: NORMAL
LDLC SERPL CALC-MCNC: 80 MG/DL (ref 0–99)
MICROALBUMIN UR-MCNC: 6 UG/ML
POTASSIUM SERPL-SCNC: 4.6 MMOL/L (ref 3.5–5.2)
PROT SERPL-MCNC: 6.4 G/DL (ref 6–8.5)
SODIUM SERPL-SCNC: 141 MMOL/L (ref 134–144)
TRIGL SERPL-MCNC: 83 MG/DL (ref 0–149)
VLDLC SERPL CALC-MCNC: 17 MG/DL (ref 5–40)

## 2018-09-03 NOTE — PROGRESS NOTES
Dear Ms. BERNAL [facility],    I hope you are doing well. I wanted to follow up on your recent test results: Your average sugar/glucose levels for the past 3 months (determined by the hemoglobin A1C blood marker) is at goal; please continue your metformin. Cholesterol levels are normal so continue your current statin. Sodium levels are normal - please relay this to your kidney doctor. Please do not hesitate to contact our clinic with any further questions.

## 2018-09-17 LAB — MAMMOGRAPHY, EXTERNAL: NORMAL

## 2018-11-20 LAB — MAMMOGRAPHY, EXTERNAL: NORMAL

## 2019-02-05 RX ORDER — ATORVASTATIN CALCIUM 40 MG/1
TABLET, FILM COATED ORAL
Qty: 90 TAB | Refills: 1 | Status: SHIPPED | OUTPATIENT
Start: 2019-02-05 | End: 2019-05-06 | Stop reason: SDUPTHER

## 2019-02-22 ENCOUNTER — OFFICE VISIT (OUTPATIENT)
Dept: FAMILY MEDICINE CLINIC | Age: 74
End: 2019-02-22

## 2019-02-22 ENCOUNTER — HOSPITAL ENCOUNTER (OUTPATIENT)
Dept: LAB | Age: 74
Discharge: HOME | End: 2019-02-22
Payer: MEDICARE

## 2019-02-22 VITALS
HEART RATE: 83 BPM | SYSTOLIC BLOOD PRESSURE: 118 MMHG | RESPIRATION RATE: 18 BRPM | WEIGHT: 180.2 LBS | TEMPERATURE: 97.7 F | HEIGHT: 63 IN | OXYGEN SATURATION: 97 % | BODY MASS INDEX: 31.93 KG/M2 | DIASTOLIC BLOOD PRESSURE: 84 MMHG

## 2019-02-22 DIAGNOSIS — C50.411 MALIGNANT NEOPLASM OF UPPER-OUTER QUADRANT OF RIGHT BREAST IN FEMALE, ESTROGEN RECEPTOR POSITIVE (HCC): ICD-10-CM

## 2019-02-22 DIAGNOSIS — E78.5 HYPERLIPIDEMIA, UNSPECIFIED HYPERLIPIDEMIA TYPE: ICD-10-CM

## 2019-02-22 DIAGNOSIS — Z17.0 MALIGNANT NEOPLASM OF UPPER-OUTER QUADRANT OF RIGHT BREAST IN FEMALE, ESTROGEN RECEPTOR POSITIVE (HCC): ICD-10-CM

## 2019-02-22 DIAGNOSIS — E11.8 CONTROLLED TYPE 2 DIABETES MELLITUS WITH COMPLICATION, WITHOUT LONG-TERM CURRENT USE OF INSULIN (HCC): Primary | ICD-10-CM

## 2019-02-22 PROCEDURE — 83036 HEMOGLOBIN GLYCOSYLATED A1C: CPT

## 2019-02-22 PROCEDURE — 80048 BASIC METABOLIC PNL TOTAL CA: CPT

## 2019-02-22 PROCEDURE — 36415 COLL VENOUS BLD VENIPUNCTURE: CPT

## 2019-02-22 PROCEDURE — 80061 LIPID PANEL: CPT

## 2019-02-22 NOTE — ASSESSMENT & PLAN NOTE
Stable, based on history, physical exam and review of pertinent labs, studies and medications; meds reconciled; continue current treatment plan. Key Antihyperglycemic Medications             metFORMIN (GLUCOPHAGE) 500 mg tablet TAKE 1 TABLET BY MOUTH TWO  TIMES DAILY WITH MEALS        Other Key Diabetic Medications             atorvastatin (LIPITOR) 40 mg tablet TAKE 1 TABLET BY MOUTH  DAILY    fish oil/borage/flax/om3,6,9 1 (OMEGA 3-6-9 COMPLEX PO) Take  by mouth. Hemp Oil    olmesartan (BENICAR) 40 mg tablet Take 1 Tab by mouth daily.  For blood pressure        Lab Results   Component Value Date/Time    Hemoglobin A1c 6.3 08/31/2018 08:59 AM    Glucose 105 08/31/2018 08:59 AM    Creatinine 0.71 08/31/2018 08:59 AM    Creatinine (POC) 0.66 02/24/2018    Microalb/Creat ratio (ug/mg creat.) 6.8 08/31/2018 08:59 AM    Cholesterol, total 154 08/31/2018 08:59 AM    HDL Cholesterol 57 08/31/2018 08:59 AM    LDL, calculated 80 08/31/2018 08:59 AM    Triglyceride 83 08/31/2018 08:59 AM     Diabetic Foot and Eye Exam HM Status   Topic Date Due    Diabetic Foot Care  08/31/2019    Eye Exam  03/07/2020

## 2019-02-22 NOTE — ASSESSMENT & PLAN NOTE
This condition is managed by Specialist.  Key Oncology Meds     Patient is on no Oncologic meds. Key Pain Meds     The patient is on no pain meds. Lab Results   Component Value Date/Time    WBC 3.4 02/07/2018 03:33 AM    ABS.  NEUTROPHILS 2.1 02/07/2018 03:33 AM    HGB 11.0 02/07/2018 03:33 AM    HCT 29.6 02/07/2018 03:33 AM    PLATELET 831 35/42/4806 03:33 AM    Creatinine 0.71 08/31/2018 08:59 AM    Creatinine (POC) 0.66 02/24/2018    BUN 18 08/31/2018 08:59 AM    ALT (SGPT) 7 08/31/2018 08:59 AM    AST (SGOT) 17 08/31/2018 08:59 AM    Albumin 4.3 08/31/2018 08:59 AM

## 2019-02-22 NOTE — PROGRESS NOTES
Patient Name: Farideh Knight   MRN: [id number]    [address] [place] [address] is a 72 y.o. female who presents with the following:     She is seen for diabetes, hypertension and hyperlipidemia. Since last visit she reports no numbness, tingling or pain in extremities, no unusual visual symptoms, no hypoglycemia, no medication side effects noted, no significant changes. Home glucose monitoring: is not performed. She reports medication compliance: compliant all of the time. Medication side effects: none. Diabetic diet compliance: compliant most of the time. Lab review: orders written for new lab studies as appropriate; see orders. Eye exam: UTD. Lab Results   Component Value Date/Time    Hemoglobin A1c 6.3 (H) 08/31/2018 08:59 AM    Hemoglobin A1c (POC) 5.6 04/07/2016 08:42 AM     Review of Systems   Constitutional: Negative for fever, malaise/fatigue and weight loss. Respiratory: Negative for cough, hemoptysis, shortness of breath and wheezing. Cardiovascular: Negative for chest pain, palpitations, leg swelling and PND. Gastrointestinal: Negative for abdominal pain, constipation, diarrhea, nausea and vomiting. The patient's medications, allergies, past medical history, surgical history, family history and social history were reviewed and updated where appropriate. Prior to Admission medications    Medication Sig Start Date End Date Taking? Authorizing Provider   cannabidiol, CBD, extract 100 mg/mL soln by SubLINGual route daily. Yes Provider, Historical   atorvastatin (LIPITOR) 40 mg tablet TAKE 1 TABLET BY MOUTH  DAILY 2/5/19  Yes Ulysses Africa, MD   SPIRIVA RESPIMAT 2.5 mcg/actuation inhaler Take  by inhalation as needed.  6/26/18  Yes Provider, Historical   metFORMIN (GLUCOPHAGE) 500 mg tablet TAKE 1 TABLET BY MOUTH TWO  TIMES DAILY WITH MEALS 8/7/18  Yes Ulysses Africa, MD   valACYclovir (VALTREX) 500 mg tablet TAKE 1 TABLET BY MOUTH  DAILY 8/7/18  Yes Ulysses Africa, MD TURMERIC, BULK, by Does Not Apply route. 1500mg, 140mg , 10mg   Yes Provider, Historical   olmesartan (BENICAR) 40 mg tablet Take 1 Tab by mouth daily. For blood pressure 7/28/18  Yes Malia Doran NP   budesonide-formoterol (SYMBICORT) 160-4.5 mcg/actuation HFAA Take 2 Puffs by inhalation two (2) times a day. 3/8/18  Yes Gabriel Jackson NP   albuterol (PROVENTIL HFA, VENTOLIN HFA, PROAIR HFA) 90 mcg/actuation inhaler Take 2 Puffs by inhalation every six (6) hours as needed for Wheezing. 2/27/18  Yes Gabriel Jackson NP   calcium-cholecalciferol, D3, (CALTRATE 600+D) tablet Take 1 Tab by mouth daily. Yes Provider, Historical   multivitamin (ONE A DAY) tablet Take 1 Tab by mouth daily. Yes Provider, Historical   OMEPRAZOLE MAGNESIUM (PRILOSEC OTC PO) Take 1 Tab by mouth daily. Yes Provider, Historical   fish oil/borage/flax/om3,6,9 1 (OMEGA 3-6-9 COMPLEX PO) Take  by mouth. Hemp Oil    Provider, Historical       Allergies   Allergen Reactions    Pcn [Penicillins] Itching     Lips itching    Femara [Letrozole] Other (comments)     htn           OBJECTIVE    Visit Vitals  /84 (BP 1 Location: Left arm, BP Patient Position: Sitting)   Pulse 83   Temp 97.7 °F (36.5 °C) (Oral)   Resp 18   Ht 5' 2.75\" (1.594 m)   Wt 180 lb 3.2 oz (81.7 kg)   SpO2 97%   BMI 32.18 kg/m²       Physical Exam   Constitutional: She is oriented to person, place, and time and well-developed, well-nourished, and in no distress. No distress. Eyes: Conjunctivae and EOM are normal. Pupils are equal, round, and reactive to light. Cardiovascular: Normal rate, regular rhythm and normal heart sounds. Exam reveals no gallop and no friction rub. No murmur heard. Pulmonary/Chest: Effort normal and breath sounds normal. No respiratory distress. She has no wheezes. Neurological: She is alert and oriented to person, place, and time. Skin: Skin is warm and dry. No rash noted. She is not diaphoretic.    Psychiatric: Mood, memory, affect and judgment normal.   Nursing note and vitals reviewed. ASSESSMENT AND PLAN  Rakesh Thornton is a 72 y.o. female who presents today for:    1. Controlled type 2 diabetes mellitus with complication, without long-term current use of insulin (HCC)  Stable, continue current treatment pending review of labs. - BASIC METABOLIC PANEL (7)  - HEMOGLOBIN A1C WITH EAG    2. Hyperlipidemia, unspecified hyperlipidemia type  Will calculate ASCVD risk score pending labs. - LIPID PANEL    3. Malignant neoplasm of upper-outer quadrant of right breast in female, estrogen receptor positive ([facility] [address]  Stable, continue current treatment. There are no discontinued medications. Follow-up Disposition:  Return in about 6 months (around 8/22/2019) for Medicare Wellness Visit (30 min). Medication risks/benefits/costs/interactions/alternatives discussed with patient. Advised patient to call back or return to office if symptoms worsen/change/persist. If patient cannot reach us or should anything more severe/urgent arise he/she should proceed directly to the nearest emergency department. Discussed expected course/resolution/complications of diagnosis in detail with patient. Patient given a written after visit summary which includes his/her diagnoses, current medications and vitals. Patient expressed understanding with the diagnosis and plan. Kristel Ridley M.D. Diagnoses and all orders for this visit:    1. Controlled type 2 diabetes mellitus with complication, without long-term current use of insulin (HCC)  Assessment & Plan:  Stable, based on history, physical exam and review of pertinent labs, studies and medications; meds reconciled; continue current treatment plan.   Key Antihyperglycemic Medications             metFORMIN (GLUCOPHAGE) 500 mg tablet TAKE 1 TABLET BY MOUTH TWO  TIMES DAILY WITH MEALS        Other Key Diabetic Medications             atorvastatin (LIPITOR) 40 mg tablet TAKE 1 TABLET BY MOUTH  DAILY    fish oil/borage/flax/om3,6,9 1 (OMEGA 3-6-9 COMPLEX PO) Take  by mouth. Hemp Oil    olmesartan (BENICAR) 40 mg tablet Take 1 Tab by mouth daily. For blood pressure        Lab Results   Component Value Date/Time    Hemoglobin A1c 6.3 08/31/2018 08:59 AM    Glucose 105 08/31/2018 08:59 AM    Creatinine 0.71 08/31/2018 08:59 AM    Creatinine (POC) 0.66 02/24/2018    Microalb/Creat ratio (ug/mg creat.) 6.8 08/31/2018 08:59 AM    Cholesterol, total 154 08/31/2018 08:59 AM    HDL Cholesterol 57 08/31/2018 08:59 AM    LDL, calculated 80 08/31/2018 08:59 AM    Triglyceride 83 08/31/2018 08:59 AM     Diabetic Foot and Eye Exam HM Status   Topic Date Due    Diabetic Foot Care  08/31/2019    Eye Exam  03/07/2020       Orders:  -     BASIC METABOLIC PANEL (7)  -     HEMOGLOBIN A1C WITH EAG    2. Hyperlipidemia, unspecified hyperlipidemia type  -     LIPID PANEL    3. Malignant neoplasm of upper-outer quadrant of right breast in female, estrogen receptor positive ([facility] [address]  Assessment & Plan: This condition is managed by Specialist.  Key Oncology Meds     Patient is on no Oncologic meds. Key Pain Meds     The patient is on no pain meds. Lab Results   Component Value Date/Time    WBC 3.4 02/07/2018 03:33 AM    ABS.  NEUTROPHILS 2.1 02/07/2018 03:33 AM    HGB 11.0 02/07/2018 03:33 AM    HCT 29.6 02/07/2018 03:33 AM    PLATELET 357 45/70/5100 03:33 AM    Creatinine 0.71 08/31/2018 08:59 AM    Creatinine (POC) 0.66 02/24/2018    BUN 18 08/31/2018 08:59 AM    ALT (SGPT) 7 08/31/2018 08:59 AM    AST (SGOT) 17 08/31/2018 08:59 AM    Albumin 4.3 08/31/2018 08:59 AM

## 2019-02-22 NOTE — PATIENT INSTRUCTIONS
DASH Diet: Care Instructions  Your Care Instructions    The DASH diet is an eating plan that can help lower your blood pressure. DASH stands for Dietary Approaches to Stop Hypertension. Hypertension is high blood pressure. The DASH diet focuses on eating foods that are high in calcium, potassium, and magnesium. These nutrients can lower blood pressure. The foods that are highest in these nutrients are fruits, vegetables, low-fat dairy products, nuts, seeds, and legumes. But taking calcium, potassium, and magnesium supplements instead of eating foods that are high in those nutrients does not have the same effect. The DASH diet also includes whole grains, fish, and poultry. The DASH diet is one of several lifestyle changes your doctor may recommend to lower your high blood pressure. Your doctor may also want you to decrease the amount of sodium in your diet. Lowering sodium while following the DASH diet can lower blood pressure even further than just the DASH diet alone. Follow-up care is a key part of your treatment and safety. Be sure to make and go to all appointments, and call your doctor if you are having problems. It's also a good idea to know your test results and keep a list of the medicines you take. How can you care for yourself at home? Following the DASH diet  · Eat 4 to 5 servings of fruit each day. A serving is 1 medium-sized piece of fruit, ½ cup chopped or canned fruit, 1/4 cup dried fruit, or 4 ounces (½ cup) of fruit juice. Choose fruit more often than fruit juice. · Eat 4 to 5 servings of vegetables each day. A serving is 1 cup of lettuce or raw leafy vegetables, ½ cup of chopped or cooked vegetables, or 4 ounces (½ cup) of vegetable juice. Choose vegetables more often than vegetable juice. · Get 2 to 3 servings of low-fat and fat-free dairy each day. A serving is 8 ounces of milk, 1 cup of yogurt, or 1 ½ ounces of cheese. · Eat 6 to 8 servings of grains each day.  A serving is 1 slice of bread, 1 ounce of dry cereal, or ½ cup of cooked rice, pasta, or cooked cereal. Try to choose whole-grain products as much as possible. · Limit lean meat, poultry, and fish to 2 servings each day. A serving is 3 ounces, about the size of a deck of cards. · Eat 4 to 5 servings of nuts, seeds, and legumes (cooked dried beans, lentils, and split peas) each week. A serving is 1/3 cup of nuts, 2 tablespoons of seeds, or ½ cup of cooked beans or peas. · Limit fats and oils to 2 to 3 servings each day. A serving is 1 teaspoon of vegetable oil or 2 tablespoons of salad dressing. · Limit sweets and added sugars to 5 servings or less a week. A serving is 1 tablespoon jelly or jam, ½ cup sorbet, or 1 cup of lemonade. · Eat less than 2,300 milligrams (mg) of sodium a day. If you limit your sodium to 1,500 mg a day, you can lower your blood pressure even more. Tips for success  · Start small. Do not try to make dramatic changes to your diet all at once. You might feel that you are missing out on your favorite foods and then be more likely to not follow the plan. Make small changes, and stick with them. Once those changes become habit, add a few more changes. · Try some of the following:  ? Make it a goal to eat a fruit or vegetable at every meal and at snacks. This will make it easy to get the recommended amount of fruits and vegetables each day. ? Try yogurt topped with fruit and nuts for a snack or healthy dessert. ? Add lettuce, tomato, cucumber, and onion to sandwiches. ? Combine a ready-made pizza crust with low-fat mozzarella cheese and lots of vegetable toppings. Try using tomatoes, squash, spinach, broccoli, carrots, cauliflower, and onions. ? Have a variety of cut-up vegetables with a low-fat dip as an appetizer instead of chips and dip. ? Sprinkle sunflower seeds or chopped almonds over salads. Or try adding chopped walnuts or almonds to cooked vegetables.   ? Try some vegetarian meals using beans and peas. Add garbanzo or kidney beans to salads. Make burritos and tacos with mashed hall beans or black beans. Where can you learn more? Go to http://ivy-yoandy.info/. Enter F305 in the search box to learn more about \"DASH Diet: Care Instructions. \"  Current as of: July 22, 2018  Content Version: 11.9  © 2760-2491 [employer]. Care instructions adapted under license by [employer] (which disclaims liability or warranty for this information). If you have questions about a medical condition or this instruction, always ask your healthcare professional. [address] any warranty or liability for your use of this information.

## 2019-02-22 NOTE — PROGRESS NOTES
Chief Complaint   Patient presents with    Hypertension     follow up     1. Have you been to the ER, urgent care clinic since your last visit? Hospitalized since your last visit? No    2. Have you seen or consulted any other health care providers outside of the [address] [place] [address] since your last visit? Include any pap smears or colon screening. No    Patient stated that she has received the first shingles vaccine at [facility] pharmacy, will obtain records.

## 2019-02-23 LAB
BUN SERPL-MCNC: 17 MG/DL (ref 8–27)
BUN/CREAT SERPL: 24 (ref 12–28)
CHLORIDE SERPL-SCNC: 104 MMOL/L (ref 96–106)
CHOLEST SERPL-MCNC: 153 MG/DL (ref 100–199)
CO2 SERPL-SCNC: 23 MMOL/L (ref 20–29)
CREAT SERPL-MCNC: 0.71 MG/DL (ref 0.57–1)
EST. AVERAGE GLUCOSE BLD GHB EST-MCNC: 131 MG/DL
GLUCOSE SERPL-MCNC: 115 MG/DL (ref 65–99)
HBA1C MFR BLD: 6.2 % (ref 4.8–5.6)
HDLC SERPL-MCNC: 64 MG/DL
INTERPRETATION, 910389: NORMAL
LDLC SERPL CALC-MCNC: 72 MG/DL (ref 0–99)
POTASSIUM SERPL-SCNC: 4.6 MMOL/L (ref 3.5–5.2)
SODIUM SERPL-SCNC: 141 MMOL/L (ref 134–144)
TRIGL SERPL-MCNC: 87 MG/DL (ref 0–149)
VLDLC SERPL CALC-MCNC: 17 MG/DL (ref 5–40)

## 2019-02-25 NOTE — PROGRESS NOTES
Dear Ms. BERNAL [facility],    I hope you are doing well. I wanted to follow up on your recent test results:    Cholesterol levels are normal; keep taking your statin. Your average sugar/glucose levels for the past 3 months (determined by the hemoglobin A1C blood marker) is at goal; keep taking your same metformin dose. Please let me know if you have any questions.

## 2019-06-04 ENCOUNTER — OFFICE VISIT (OUTPATIENT)
Dept: FAMILY MEDICINE CLINIC | Age: 74
End: 2019-06-04

## 2019-06-04 VITALS
OXYGEN SATURATION: 98 % | HEART RATE: 77 BPM | TEMPERATURE: 98.4 F | DIASTOLIC BLOOD PRESSURE: 90 MMHG | SYSTOLIC BLOOD PRESSURE: 138 MMHG | HEIGHT: 60 IN | RESPIRATION RATE: 18 BRPM | BODY MASS INDEX: 35.73 KG/M2 | WEIGHT: 182 LBS

## 2019-06-04 DIAGNOSIS — S16.1XXA STRAIN OF NECK MUSCLE, INITIAL ENCOUNTER: Primary | ICD-10-CM

## 2019-06-04 RX ORDER — CYCLOBENZAPRINE HCL 5 MG
5 TABLET ORAL
Qty: 15 TAB | Refills: 0 | Status: SHIPPED | OUTPATIENT
Start: 2019-06-04 | End: 2020-10-20

## 2019-06-04 NOTE — PATIENT INSTRUCTIONS
Neck Strain or Sprain: Rehab Exercises  Your Care Instructions  Here are some examples of typical rehabilitation exercises for your condition. Start each exercise slowly. Ease off the exercise if you start to have pain. Your doctor or physical therapist will tell you when you can start these exercises and which ones will work best for you. How to do the exercises  Neck rotation    1. Sit in a firm chair, or stand up straight. 2. Keeping your chin level, turn your head to the right, and hold for 15 to 30 seconds. 3. Turn your head to the left and hold for 15 to 30 seconds. 4. Repeat 2 to 4 times to each side. Neck stretches    1. Look straight ahead, and tip your right ear to your right shoulder. Do not let your left shoulder rise up as you tip your head to the right. 2. Hold for 15 to 30 seconds. 3. Tilt your head to the left. Do not let your right shoulder rise up as you tip your head to the left. 4. Hold for 15 to 30 seconds. 5. Repeat 2 to 4 times to each side. Forward neck flexion    1. Sit in a firm chair, or stand up straight. 2. Bend your head forward. 3. Hold for 15 to 30 seconds. 4. Repeat 2 to 4 times. Lateral (side) bend strengthening    1. With your right hand, place your first two fingers on your right temple. 2. Start to bend your head to the side while using gentle pressure from your fingers to keep your head from bending. 3. Hold for about 6 seconds. 4. Repeat 8 to 12 times. 5. Switch hands and repeat the same exercise on your left side. Forward bend strengthening    1. Place your first two fingers of either hand on your forehead. 2. Start to bend your head forward while using gentle pressure from your fingers to keep your head from bending. 3. Hold for about 6 seconds. 4. Repeat 8 to 12 times. Neutral position strengthening    1. Using one hand, place your fingertips on the back of your head at the top of your neck.   2. Start to bend your head backward while using gentle pressure from your fingers to keep your head from bending. 3. Hold for about 6 seconds. 4. Repeat 8 to 12 times. Chin tuck    1. Lie on the floor with a rolled-up towel under your neck. Your head should be touching the floor. 2. Slowly bring your chin toward your chest.  3. Hold for a count of 6, and then relax for up to 10 seconds. 4. Repeat 8 to 12 times. Follow-up care is a key part of your treatment and safety. Be sure to make and go to all appointments, and call your doctor if you are having problems. It's also a good idea to know your test results and keep a list of the medicines you take. Where can you learn more? Go to http://ivy-yoandy.info/. Enter M679 in the search box to learn more about \"Neck Strain or Sprain: Rehab Exercises. \"  Current as of: September 20, 2018  Content Version: 11.9  © 4204-0766 [employer]. Care instructions adapted under license by [employer] (which disclaims liability or warranty for this information). If you have questions about a medical condition or this instruction, always ask your healthcare professional. [address] any warranty or liability for your use of this information.

## 2019-06-04 NOTE — PROGRESS NOTES
[facility] Note  Subjective:      Elodia Meng is a 72 y.o. female who presents for an acute visit with the following chief complaints. Chief Complaint   Patient presents with    Neck Pain     pt states she is having neck and left shoulder pain. started 2 weeks ago now       Neck Pain  Patient complains of neck pain. Onset of symptoms was 2 weeks ago, unchanged. Pain is the left posterior and lateral neck with radiation down the left upper arm. Constant burning and aching pain, mild to moderate in severity. Inciting event: lots of moving and heavy at work recently. Patient denies numbness, tingling, paresthesias in upper extremities. Patient denies weakness, diminished  strength, lack of coordination. Patient has had recurrent self limited episodes of neck pain in the past.  Previous treatments include: PT with relief. Curren therapy:  CBD oil massages was a little helpful. 1 Aleve with some relief. Current Outpatient Medications   Medication Sig Dispense Refill    cyclobenzaprine (FLEXERIL) 5 mg tablet Take 1 Tab by mouth nightly. 15 Tab 0    atorvastatin (LIPITOR) 40 mg tablet TAKE 1 TABLET BY MOUTH  DAILY 90 Tab 3    cannabidiol, CBD, extract 100 mg/mL soln by SubLINGual route daily.  SPIRIVA RESPIMAT 2.5 mcg/actuation inhaler Take  by inhalation as needed.  metFORMIN (GLUCOPHAGE) 500 mg tablet TAKE 1 TABLET BY MOUTH TWO  TIMES DAILY WITH MEALS 180 Tab 1    valACYclovir (VALTREX) 500 mg tablet TAKE 1 TABLET BY MOUTH  DAILY 90 Tab 1    TURMERIC, BULK, by Does Not Apply route. 1500mg, 140mg , 10mg      olmesartan (BENICAR) 40 mg tablet Take 1 Tab by mouth daily. For blood pressure 90 Tab 3    budesonide-formoterol (SYMBICORT) 160-4.5 mcg/actuation HFAA Take 2 Puffs by inhalation two (2) times a day.  1 Inhaler 5    albuterol (PROVENTIL HFA, VENTOLIN HFA, PROAIR HFA) 90 mcg/actuation inhaler Take 2 Puffs by inhalation every six (6) hours as needed for Wheezing. 1 Inhaler 2    calcium-cholecalciferol, D3, (CALTRATE 600+D) tablet Take 1 Tab by mouth daily.  multivitamin (ONE A DAY) tablet Take 1 Tab by mouth daily.  OMEPRAZOLE MAGNESIUM (PRILOSEC OTC PO) Take 1 Tab by mouth daily.  fish oil/borage/flax/om3,6,9 1 (OMEGA 3-6-9 COMPLEX PO) Take  by mouth. Hemp Oil       Allergies   Allergen Reactions    Pcn [Penicillins] Itching     Lips itching    Femara [Letrozole] Other (comments)     htn       ROS:   Complete review of systems was reviewed with pertinent information listed in HPI. Review of Systems   Constitutional: Negative for chills, diaphoresis, fever, malaise/fatigue and weight loss. HENT: Negative for congestion, ear pain, hearing loss, sinus pain, sore throat and tinnitus. Eyes: Negative for blurred vision and double vision. Respiratory: Negative for shortness of breath. Cardiovascular: Negative for chest pain, palpitations and leg swelling. Gastrointestinal: Negative for abdominal pain, blood in stool, constipation, diarrhea, heartburn, melena, nausea and vomiting. Genitourinary: Negative for dysuria, frequency, hematuria and urgency. Musculoskeletal: Positive for neck pain. Negative for joint pain and myalgias. Skin: Negative for itching and rash. Neurological: Negative for dizziness, tingling, tremors, sensory change, speech change, focal weakness, weakness and headaches. Endo/Heme/Allergies: Negative for polydipsia. Psychiatric/Behavioral: Negative. Objective:     Visit Vitals  /90   Pulse 77   Temp 98.4 °F (36.9 °C) (Oral)   Resp 18   Ht 5' (1.524 m)   Wt 182 lb (82.6 kg)   SpO2 98%   BMI 35.54 kg/m²       Vitals and Nurse Documentation reviewed. Physical Exam   Constitutional: She is well-developed, well-nourished, and in no distress. HENT:   Head: Normocephalic and atraumatic. Eyes: Pupils are equal, round, and reactive to light. EOM are normal.   Neck: Neck supple.  Muscular tenderness present. No spinous process tenderness present. Decreased range of motion present. Cardiovascular: Normal rate. Pulmonary/Chest: Effort normal.   Musculoskeletal:        Cervical back: She exhibits decreased range of motion and tenderness. She exhibits no bony tenderness, no swelling, no edema, no deformity, no laceration, no pain and no spasm. Lymphadenopathy:     She has no cervical adenopathy. Neurological: She is alert. She has normal sensation, normal strength, normal reflexes and intact cranial nerves. Gait normal.   Reflex Scores:       Bicep reflexes are 2+ on the right side and 2+ on the left side. Brachioradialis reflexes are 2+ on the right side and 2+ on the left side. Skin: Skin is warm, dry and intact. She is not diaphoretic. Psychiatric: Mood and affect normal.   Nursing note and vitals reviewed. Assessment/Plan:     Diagnoses and all orders for this visit:    1. Strain of neck muscle, initial encounter: Acute, non-traumatic neck discomfort without red flags or neurovascular deficits on exam. Discussed likely secondary to muscular strain. Start conservative therapy; NSAID's, muscle relaxer PRN, heating pad, PT.   -     cyclobenzaprine (FLEXERIL) 5 mg tablet; Take 1 Tab by mouth nightly.  -     REFERRAL TO PHYSICAL THERAPY      Follow-up and Dispositions    · Return if symptoms worsen or fail to improve.          Discussed expected course/resolution/complications of diagnosis in detail with patient.    Medication risks/benefits/costs/interactions/alternatives discussed with patient.    Pt was given an after visit summary which includes diagnoses, current medications & vitals.  Pt expressed understanding with the diagnosis and plan

## 2019-06-04 NOTE — PROGRESS NOTES
Chief Complaint   Patient presents with    Neck Pain     pt states she is having neck and left shoulder pain. started 2 weeks ago now     \"REVIEWED RECORD IN PREPARATION FOR VISIT AND HAVE OBTAINED THE NECESSARY DOCUMENTATION\"  1. Have you been to the ER, urgent care clinic since your last visit? Hospitalized since your last visit? No    2. Have you seen or consulted any other health care providers outside of the [address] [place] [address] since your last visit? Include any pap smears or colon screening.  No

## 2019-06-11 ENCOUNTER — HOSPITAL ENCOUNTER (OUTPATIENT)
Dept: PHYSICAL THERAPY | Age: 74
Discharge: HOME | End: 2019-06-11
Payer: MEDICARE

## 2019-06-11 DIAGNOSIS — S16.1XXA STRAIN OF NECK MUSCLE, INITIAL ENCOUNTER: ICD-10-CM

## 2019-06-11 PROCEDURE — 97161 PT EVAL LOW COMPLEX 20 MIN: CPT | Performed by: PHYSICAL THERAPIST

## 2019-06-11 PROCEDURE — 97110 THERAPEUTIC EXERCISES: CPT | Performed by: PHYSICAL THERAPIST

## 2019-06-11 PROCEDURE — 97140 MANUAL THERAPY 1/> REGIONS: CPT | Performed by: PHYSICAL THERAPIST
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
## 2019-06-11 NOTE — PROGRESS NOTES
[facility] Physical Therapy  [address]  [place], [address] [facility]  Phone: [phone]  Fax: [phone]    Plan of Care/Statement of Necessity for Physical Therapy Services  2-15    Patient name: Nj Almonte  : 1945  Provider#: [id number]  Referral source: Marva Corcoran NP      Medical/Treatment Diagnosis: Strain of neck muscle, initial encounter [S16. 1XXA]     Prior Hospitalization: see medical history     Comorbidities: osteoporosis, DM, OA, HTN, scoliosis, B DIDIER, breast CA 15 years ago, toe fusions  Prior Level of Function: Intermittent neck pain, activity based  Medications: Verified on Patient Summary List  Start of Care: 19      Onset Date: 3 weeks ago   The Plan of Care and following information is based on the information from the initial evaluation. Assessment/ key information: Patient presents with neck pain and L UE pain with postural dysfunction and limited cervical ROM limiting ability to perform functional activities such as sleeping. She would benefit from skilled PT to increase ROM, improve posture and decrease pain so she can return to prior level of function. Problem List: pain affecting function, decrease ROM, decrease ADL/ functional abilitiies, decrease activity tolerance and decrease flexibility/ joint mobility   Treatment Plan may include any combination of the following: Therapeutic exercise, Therapeutic activities, Neuromuscular re-education, Physical agent/modality, Manual therapy and Patient education  Patient / Family readiness to learn indicated by: asking questions, trying to perform skills and interest  Persons(s) to be included in education: patient (P)  Barriers to Learning/Limitations: None  Patient Goal (s): Not to be in pain, be able to sleep well  Patient Self Reported Health Status: good  Rehabilitation Potential: good    Short Term Goals: To be accomplished in 2 weeks:    1.  Patient will be I in HEP to promote self management of symptoms. 2. Patient will report pain level at worst as less than or equal to 6/10 so they can be performed without pain. Long Term Goals: To be accomplished in 4-6 weeks:    1. Patient will report pain level at worst as less than or equal to 4/10 so they can be performed without pain. 2. Patient will have B cervical rotation AROM > or = 50 degrees pain free to assist with driving. 3.Patient will be able to sleep > or = 6.5 hrs without waking secondary to neck or L UE pain. Frequency / Duration: Patient to be seen 2 times per week for 4 weeks. Patient/ Caregiver education and instruction: activity modification and exercises    [x]  Plan of care has been reviewed with PTA        Certification Period: 6/11/19-9/11/19  Shawn Portillo, PT 6/11/2019 4:16 PM    ________________________________________________________________________    I certify that the above Therapy Services are being furnished while the patient is under my care. I agree with the treatment plan and certify that this therapy is necessary.     [de-identified] Signature:____________________  Date:____________Time: _________

## 2019-06-13 ENCOUNTER — HOSPITAL ENCOUNTER (OUTPATIENT)
Dept: PHYSICAL THERAPY | Age: 74
Discharge: HOME | End: 2019-06-13
Payer: MEDICARE

## 2019-06-13 PROCEDURE — 97110 THERAPEUTIC EXERCISES: CPT | Performed by: PHYSICAL THERAPIST

## 2019-06-13 PROCEDURE — 97140 MANUAL THERAPY 1/> REGIONS: CPT | Performed by: PHYSICAL THERAPIST

## 2019-06-13 NOTE — PROGRESS NOTES
PT DAILY TREATMENT NOTE - [facility] 2-15    Patient Name: Nelly Done  Date:2019  : 1945  [x]  Patient  Verified  Payor: Sudhakar Ladd / Plan: [place] MEDICARE PART A & B / Product Type: Medicare /    In time:1025AM  Out time:1105  Total Treatment Time (min): 40  Total Timed Codes (min): 30  1:1 Treatment Time ( [address] only): 30   Visit #:  2    Treatment Area: Neck pain [M54.2]    SUBJECTIVE  Pain Level (0-10 scale): 2  Any medication changes, allergies to medications, adverse drug reactions, diagnosis change, or new procedure performed?: [x] No    [] Yes (see summary sheet for update)  Subjective functional status/changes:   [] No changes reported  Patient reports that her neck feels \"maybe a little better. \"     OBJECTIVE    Modality rationale: decrease pain to improve the patients ability to perform ADLs   Min Type Additional Details       [] Estim: []Att   []Unatt    []TENS instruct                  []IFC  []Premod   []NMES                     []Other:  []w/US   []w/ice   []w/heat  Position:  Location:       []  Traction: [] Cervical       []Lumbar                       [] Prone          []Supine                       []Intermittent   []Continuous Lbs:  [] before manual  [] after manual  []w/heat    []  Ultrasound: []Continuous   [] Pulsed                       at: []1MHz   []3MHz Location:  W/cm2:    [] Paraffin         Location:   []w/heat   10 []  Ice     [x]  Heat pre treatment  []  Ice massage Position:seated  Location:neck     []  Laser  []  Other: Position:  Location:      []  Vasopneumatic Device Pressure:       [] lo [] med [] hi   Temperature:      [x] Skin assessment post-treatment:  [x]intact []redness- no adverse reaction    []redness  adverse reaction:     15 min Therapeutic Exercise:  [x] See flow sheet : progressed per flow sheet   Rationale: increase ROM to improve the patients ability to perform ADLs      15 min Manual Therapy: STM L UT    Rationale: decrease pain, increase ROM and increase tissue extensibility to improve the patients ability to perform ADLs, sleep              With   [] TE   [] TA   [] neuro   [] other: Patient Education: [x] Review HEP    [] Progressed/Changed HEP based on:   [] positioning   [] body mechanics   [] transfers   [] heat/ice application    [] other:      Other Objective/Functional Measures: nt     Pain Level (0-10 scale) post treatment: \"good\"    ASSESSMENT/Changes in Function:   Patient tolerated progression of exercise program well today. Patient will continue to benefit from skilled PT services to modify and progress therapeutic interventions, address functional mobility deficits, address ROM deficits, address strength deficits, analyze and address soft tissue restrictions, analyze and cue movement patterns, analyze and modify body mechanics/ergonomics and assess and modify postural abnormalities to attain remaining goals. Progress towards goals / Updated goals:  Short Term Goals: To be accomplished in 2 weeks:               1. Patient will be I in HEP to promote self management of symptoms. MET              2. Patient will report pain level at worst as less than or equal to 6/10 so they can be performed without pain. PROGRESSING   Long Term Goals: To be accomplished in 4-6 weeks:               1.  Patient will report pain level at worst as less than or equal to 4/10 so they can be performed without pain. 2. Patient will have B cervical rotation AROM > or = 50 degrees pain free to assist with driving. 3.Patient will be able to sleep > or = 6.5 hrs without waking secondary to neck or L UE pain.          PLAN  []  Upgrade activities as tolerated     [x]  Continue plan of care  []  Update interventions per flow sheet       []  Discharge due to:_  []  Other:_      Stu Sarmiento, PT 6/13/2019

## 2019-06-17 ENCOUNTER — HOSPITAL ENCOUNTER (OUTPATIENT)
Dept: PHYSICAL THERAPY | Age: 74
Discharge: HOME | End: 2019-06-17
Payer: MEDICARE

## 2019-06-17 PROCEDURE — 97140 MANUAL THERAPY 1/> REGIONS: CPT | Performed by: PHYSICAL THERAPIST

## 2019-06-17 PROCEDURE — 97110 THERAPEUTIC EXERCISES: CPT | Performed by: PHYSICAL THERAPIST

## 2019-06-17 NOTE — PROGRESS NOTES
PT DAILY TREATMENT NOTE - [facility] 2-15    Patient Name: Laurie Silva  Date:2019  : 1945  [x]  Patient  Verified  Payor: Melvi Scott / Plan: [place] MEDICARE PART A & B / Product Type: Medicare /    In time:1205PM  Out time:1245PM  Total Treatment Time (min): 40  Total Timed Codes (min): 30  1:1 Treatment Time ( [address] only): 30   Visit #:  3    Treatment Area: Neck pain [M54.2]    SUBJECTIVE  Pain Level (0-10 scale): 1  Any medication changes, allergies to medications, adverse drug reactions, diagnosis change, or new procedure performed?: [x] No    [] Yes (see summary sheet for update)  Subjective functional status/changes:   [] No changes reported  Patient reports that her neck is feeling better. It feels good during the day but continues to be painful at night time.      OBJECTIVE    Modality rationale: decrease pain to improve the patients ability to perform ADLs   Min Type Additional Details       [] Estim: []Att   []Unatt    []TENS instruct                  []IFC  []Premod   []NMES                     []Other:  []w/US   []w/ice   []w/heat  Position:  Location:       []  Traction: [] Cervical       []Lumbar                       [] Prone          []Supine                       []Intermittent   []Continuous Lbs:  [] before manual  [] after manual  []w/heat    []  Ultrasound: []Continuous   [] Pulsed                       at: []1MHz   []3MHz Location:  W/cm2:    [] Paraffin         Location:   []w/heat   10 []  Ice     [x]  Heat pre treatment  []  Ice massage Position:seated  Location:neck     []  Laser  []  Other: Position:  Location:      []  Vasopneumatic Device Pressure:       [] lo [] med [] hi   Temperature:      [x] Skin assessment post-treatment:  [x]intact []redness- no adverse reaction    []redness  adverse reaction:     15 min Therapeutic Exercise:  [x] See flow sheet : progressed per flow sheet   Rationale: increase ROM to improve the patients ability to perform ADLs      15 min Manual Therapy: STM L UT    Rationale: decrease pain, increase ROM and increase tissue extensibility to improve the patients ability to perform ADLs, sleep              With   [] TE   [] TA   [] neuro   [] other: Patient Education: [x] Review HEP    [] Progressed/Changed HEP based on:   [] positioning   [] body mechanics   [] transfers   [] heat/ice application    [] other:      Other Objective/Functional Measures: nt     Pain Level (0-10 scale) post treatment: 0    ASSESSMENT/Changes in Function:   Patient tolerated progression of exercise program well today. Patient will continue to benefit from skilled PT services to modify and progress therapeutic interventions, address functional mobility deficits, address ROM deficits, address strength deficits, analyze and address soft tissue restrictions, analyze and cue movement patterns, analyze and modify body mechanics/ergonomics and assess and modify postural abnormalities to attain remaining goals. Progress towards goals / Updated goals:  Short Term Goals: To be accomplished in 2 weeks:               1. Patient will be I in HEP to promote self management of symptoms. MET              2. Patient will report pain level at worst as less than or equal to 6/10 so they can be performed without pain. PROGRESSING   Long Term Goals: To be accomplished in 4-6 weeks:               1.  Patient will report pain level at worst as less than or equal to 4/10 so they can be performed without pain. 2. Patient will have B cervical rotation AROM > or = 50 degrees pain free to assist with driving. 3.Patient will be able to sleep > or = 6.5 hrs without waking secondary to neck or L UE pain.          PLAN  []  Upgrade activities as tolerated     [x]  Continue plan of care  []  Update interventions per flow sheet       []  Discharge due to:_  []  Other:_      Alisa Schaeffer, PT 6/17/2019

## 2019-06-20 ENCOUNTER — HOSPITAL ENCOUNTER (OUTPATIENT)
Dept: PHYSICAL THERAPY | Age: 74
Discharge: HOME | End: 2019-06-20
Payer: MEDICARE

## 2019-06-20 PROCEDURE — 97110 THERAPEUTIC EXERCISES: CPT | Performed by: PHYSICAL THERAPIST

## 2019-06-20 PROCEDURE — 97140 MANUAL THERAPY 1/> REGIONS: CPT | Performed by: PHYSICAL THERAPIST

## 2019-06-20 NOTE — PROGRESS NOTES
PT DAILY TREATMENT NOTE - [facility] 2-15    Patient Name: Cheyenne Lam  Date:2019  : 1945  [x]  Patient  Verified  Payor: Ruthy Currie / Plan: [place] / Product Type: Medicare /    In time:1030AM  Out time:1110AM  Total Treatment Time (min): 40  Total Timed Codes (min): 30  1:1 Treatment Time ([facility] only): 30   Visit #:  4    Treatment Area: Neck pain [M54.2]    SUBJECTIVE  Pain Level (0-10 scale): 2  Any medication changes, allergies to medications, adverse drug reactions, diagnosis change, or new procedure performed?: [x] No    [] Yes (see summary sheet for update)  Subjective functional status/changes:   [] No changes reported  Patient reports that she has run out of her muscle relaxer and her neck has been feeling more sore. She has been feeling pain into her L UE at night times.    OBJECTIVE    Modality rationale: decrease pain to improve the patients ability to perform ADLs   Min Type Additional Details       [] Estim: []Att   []Unatt    []TENS instruct                  []IFC  []Premod   []NMES                     []Other:  []w/US   []w/ice   []w/heat  Position:  Location:       []  Traction: [] Cervical       []Lumbar                       [] Prone          []Supine                       []Intermittent   []Continuous Lbs:  [] before manual  [] after manual  []w/heat    []  Ultrasound: []Continuous   [] Pulsed                       at: []1MHz   []3MHz Location:  W/cm2:    [] Paraffin         Location:   []w/heat   10 []  Ice     [x]  Heat pre treatment  []  Ice massage Position:seated  Location:neck     []  Laser  []  Other: Position:  Location:      []  Vasopneumatic Device Pressure:       [] lo [] med [] hi   Temperature:      [x] Skin assessment post-treatment:  [x]intact []redness- no adverse reaction    []redness  adverse reaction:     15 min Therapeutic Exercise:  [x] See flow sheet : progressed per flow sheet   Rationale: increase ROM to improve the patients ability to perform ADLs      15 min Manual Therapy: STM L UT    Rationale: decrease pain, increase ROM and increase tissue extensibility to improve the patients ability to perform ADLs, sleep              With   [] TE   [] TA   [] neuro   [] other: Patient Education: [x] Review HEP    [] Progressed/Changed HEP based on:   [] positioning   [] body mechanics   [] transfers   [] heat/ice application    [] other:      Other Objective/Functional Measures: nt     Pain Level (0-10 scale) post treatment: 1    ASSESSMENT/Changes in Function:   Patient tolerated treatment well today. Patient will continue to benefit from skilled PT services to modify and progress therapeutic interventions, address functional mobility deficits, address ROM deficits, address strength deficits, analyze and address soft tissue restrictions, analyze and cue movement patterns, analyze and modify body mechanics/ergonomics and assess and modify postural abnormalities to attain remaining goals. Progress towards goals / Updated goals:  Short Term Goals: To be accomplished in 2 weeks:               1. Patient will be I in HEP to promote self management of symptoms. MET              2. Patient will report pain level at worst as less than or equal to 6/10 so they can be performed without pain. PROGRESSING   Long Term Goals: To be accomplished in 4-6 weeks:               1.  Patient will report pain level at worst as less than or equal to 4/10 so they can be performed without pain. 2. Patient will have B cervical rotation AROM > or = 50 degrees pain free to assist with driving. 3.Patient will be able to sleep > or = 6.5 hrs without waking secondary to neck or L UE pain.          PLAN  []  Upgrade activities as tolerated     [x]  Continue plan of care  []  Update interventions per flow sheet       []  Discharge due to:_  []  Other:_      Judah Curry, PT 6/20/2019

## 2019-06-24 ENCOUNTER — HOSPITAL ENCOUNTER (OUTPATIENT)
Dept: PHYSICAL THERAPY | Age: 74
Discharge: HOME | End: 2019-06-24
Payer: MEDICARE

## 2019-06-24 PROCEDURE — 97140 MANUAL THERAPY 1/> REGIONS: CPT | Performed by: PHYSICAL THERAPIST

## 2019-06-24 PROCEDURE — 97110 THERAPEUTIC EXERCISES: CPT | Performed by: PHYSICAL THERAPIST

## 2019-06-24 NOTE — PROGRESS NOTES
PT DAILY TREATMENT NOTE - [facility] 2-15    Patient Name: Deandra Allred  Date:2019  : 1945  [x]  Patient  Verified  Payor: Veena Padilla / Plan: [place] MED[place] PART A & B / Product Type: Medicare /    In time:905AM  Out time:940AM  Total Treatment Time (min): 35  Total Timed Codes (min): 25  1:1 Treatment Time ( [address] only): 25  Visit #:  5    Treatment Area: Neck pain [M54.2]    SUBJECTIVE  Pain Level (0-10 scale): 1  Any medication changes, allergies to medications, adverse drug reactions, diagnosis change, or new procedure performed?: [x] No    [] Yes (see summary sheet for update)  Subjective functional status/changes:   [] No changes reported  Patient reports that her neck is feeling better.    OBJECTIVE    Modality rationale: decrease pain to improve the patients ability to perform ADLs   Min Type Additional Details       [] Estim: []Att   []Unatt    []TENS instruct                  []IFC  []Premod   []NMES                     []Other:  []w/US   []w/ice   []w/heat  Position:  Location:       []  Traction: [] Cervical       []Lumbar                       [] Prone          []Supine                       []Intermittent   []Continuous Lbs:  [] before manual  [] after manual  []w/heat    []  Ultrasound: []Continuous   [] Pulsed                       at: []1MHz   []3MHz Location:  W/cm2:    [] Paraffin         Location:   []w/heat   10 []  Ice     [x]  Heat pre treatment  []  Ice massage Position:seated  Location:neck     []  Laser  []  Other: Position:  Location:      []  Vasopneumatic Device Pressure:       [] lo [] med [] hi   Temperature:      [x] Skin assessment post-treatment:  [x]intact []redness- no adverse reaction    []redness  adverse reaction:     15 min Therapeutic Exercise:  [x] See flow sheet : progressed per flow sheet   Rationale: increase ROM to improve the patients ability to perform ADLs      10 min Manual Therapy: STM L UT    Rationale: decrease pain, increase ROM and increase tissue extensibility to improve the patients ability to perform ADLs, sleep              With   [] TE   [] TA   [] neuro   [] other: Patient Education: [x] Review HEP    [] Progressed/Changed HEP based on:   [] positioning   [] body mechanics   [] transfers   [] heat/ice application    [] other:      Other Objective/Functional Measures: nt     Pain Level (0-10 scale) post treatment: 0    ASSESSMENT/Changes in Function:   Patient tolerated treatment well today. Patient will continue to benefit from skilled PT services to modify and progress therapeutic interventions, address functional mobility deficits, address ROM deficits, address strength deficits, analyze and address soft tissue restrictions, analyze and cue movement patterns, analyze and modify body mechanics/ergonomics and assess and modify postural abnormalities to attain remaining goals. Progress towards goals / Updated goals:  Short Term Goals: To be accomplished in 2 weeks:               1. Patient will be I in HEP to promote self management of symptoms. MET              2. Patient will report pain level at worst as less than or equal to 6/10 so they can be performed without pain. PROGRESSING   Long Term Goals: To be accomplished in 4-6 weeks:               1.  Patient will report pain level at worst as less than or equal to 4/10 so they can be performed without pain. 2. Patient will have B cervical rotation AROM > or = 50 degrees pain free to assist with driving. 3.Patient will be able to sleep > or = 6.5 hrs without waking secondary to neck or L UE pain.          PLAN  []  Upgrade activities as tolerated     [x]  Continue plan of care  []  Update interventions per flow sheet       []  Discharge due to:_  []  Other:_      Bernardino Alvarado, PT 6/24/2019

## 2019-06-26 ENCOUNTER — HOSPITAL ENCOUNTER (OUTPATIENT)
Dept: PHYSICAL THERAPY | Age: 74
Discharge: HOME | End: 2019-06-26
Payer: MEDICARE

## 2019-06-26 PROCEDURE — 97140 MANUAL THERAPY 1/> REGIONS: CPT | Performed by: PHYSICAL THERAPIST

## 2019-06-26 PROCEDURE — 97110 THERAPEUTIC EXERCISES: CPT | Performed by: PHYSICAL THERAPIST

## 2019-06-26 NOTE — PROGRESS NOTES
PT DAILY TREATMENT NOTE - [facility] 2-15    Patient Name: Elodia Meng  Date:2019  : 1945  [x]  Patient  Verified  Payor: Scout Lorenzo / Plan: [place] MEDICARE PART A & B / Product Type: Medicare /    In time:1055AM  Out time: 1130AM  Total Treatment Time (min): 35  Total Timed Codes (min): 25  1:1 Treatment Time ( only): 25  Visit #:  6    Treatment Area: Neck pain [M54.2]    SUBJECTIVE  Pain Level (0-10 scale): 0  Any medication changes, allergies to medications, adverse drug reactions, diagnosis change, or new procedure performed?: [x] No    [] Yes (see summary sheet for update)  Subjective functional status/changes:   [] No changes reported  Patient reports that her neck is feeling better. She had some pain sitting on a big cushioned couch yesterday that eased after she got out of that position.      OBJECTIVE    Modality rationale: decrease pain to improve the patients ability to perform ADLs   Min Type Additional Details       [] Estim: []Att   []Unatt    []TENS instruct                  []IFC  []Premod   []NMES                     []Other:  []w/US   []w/ice   []w/heat  Position:  Location:       []  Traction: [] Cervical       []Lumbar                       [] Prone          []Supine                       []Intermittent   []Continuous Lbs:  [] before manual  [] after manual  []w/heat    []  Ultrasound: []Continuous   [] Pulsed                       at: []1MHz   []3MHz Location:  W/cm2:    [] Paraffin         Location:   []w/heat   10 []  Ice     [x]  Heat pre treatment  []  Ice massage Position:seated  Location:neck     []  Laser  []  Other: Position:  Location:      []  Vasopneumatic Device Pressure:       [] lo [] med [] hi   Temperature:      [x] Skin assessment post-treatment:  [x]intact []redness- no adverse reaction    []redness  adverse reaction:     15 min Therapeutic Exercise:  [x] See flow sheet : progressed per flow sheet   Rationale: increase ROM to improve the patients ability to perform ADLs      10 min Manual Therapy: STM L UT    Rationale: decrease pain, increase ROM and increase tissue extensibility to improve the patients ability to perform ADLs, sleep              With   [] TE   [] TA   [] neuro   [] other: Patient Education: [x] Review HEP    [] Progressed/Changed HEP based on:   [] positioning   [] body mechanics   [] transfers   [] heat/ice application    [] other:      Other Objective/Functional Measures: nt     Pain Level (0-10 scale) post treatment: 0    ASSESSMENT/Changes in Function:   Patient tolerated treatment well today. Progressing with decreased pain. Patient will continue to benefit from skilled PT services to modify and progress therapeutic interventions, address functional mobility deficits, address ROM deficits, address strength deficits, analyze and address soft tissue restrictions, analyze and cue movement patterns, analyze and modify body mechanics/ergonomics and assess and modify postural abnormalities to attain remaining goals. Progress towards goals / Updated goals:  Short Term Goals: To be accomplished in 2 weeks:               1. Patient will be I in HEP to promote self management of symptoms. MET              2. Patient will report pain level at worst as less than or equal to 6/10 so they can be performed without pain. PROGRESSING   Long Term Goals: To be accomplished in 4-6 weeks:               1.  Patient will report pain level at worst as less than or equal to 4/10 so they can be performed without pain. 2. Patient will have B cervical rotation AROM > or = 50 degrees pain free to assist with driving. 3.Patient will be able to sleep > or = 6.5 hrs without waking secondary to neck or L UE pain. PLAN  []  Upgrade activities as tolerated     [x]  Continue plan of care Patient to be out of town on vacation.  Resume POC upon return.   []  Update interventions per flow sheet       []  Discharge due to:_  []  Other:_ Chastity Camp, PT 6/26/2019

## 2019-07-22 ENCOUNTER — HOSPITAL ENCOUNTER (OUTPATIENT)
Dept: PHYSICAL THERAPY | Age: 74
Discharge: HOME | End: 2019-07-22
Payer: MEDICARE

## 2019-07-22 PROCEDURE — 97140 MANUAL THERAPY 1/> REGIONS: CPT | Performed by: PHYSICAL THERAPIST

## 2019-07-22 PROCEDURE — 97110 THERAPEUTIC EXERCISES: CPT | Performed by: PHYSICAL THERAPIST

## 2019-07-22 NOTE — PROGRESS NOTES
PT DAILY TREATMENT NOTE/PROGRESS NOTE- [facility] 2-15    Patient Name: Melisa Cabot  Date:2019  : 1945  [x]  Patient  Verified  Payor: Shania Sharp / Plan: [place] MEDICARE PART A & B / Product Type: Medicare /    In time:1130AM  Out time: 1210PM  Total Treatment Time (min): 40  Total Timed Codes (min): 30  1:1 Treatment Time ( [address] only): 30  Visit #:  7    Treatment Area: Neck pain [M54.2]    SUBJECTIVE  Pain Level (0-10 scale): 1 current, 3 worst   Any medication changes, allergies to medications, adverse drug reactions, diagnosis change, or new procedure performed?: [x] No    [] Yes (see summary sheet for update)  Subjective functional status/changes:   [] No changes reported  Patient reports 75 % improvement in neck pain since beginning therapy. Reports pain with turning her head to L side.      OBJECTIVE  Observation: forward head posture, increased thoracic kyphosis     ROM:   Cervical AROM:   Flexion: 40 degrees  Extension: 40 degrees   R side bendin degrees  L side bendin degrees  R rotation:  45 degrees  L rotation:  50 degrees       Shoulder AROM: WFL pain free      Strength:   R Shoulder flexion: 5/5  R Elbow flexion: 5/5  R Elbow extension: 5/5  R Wrist extension: 5/5  R Wrist flexion: 5/5  R Finger abduction: 5/5     L Shoulder flexion: 5/5  L Elbow flexion: 5/5  L Elbow extension: 5/5  L Wrist extension: 5/5  L Wrist flexion: 5/5  L Finger abduction: 5/5     Palpation: Tender to palpation L infraspinatus     Joint mobility: hypomobile B lateral cervical SB PPIVMs       Special Tests: distraction -     Modality rationale: decrease pain to improve the patients ability to perform ADLs   Min Type Additional Details       [] Estim: []Att   []Unatt    []TENS instruct                  []IFC  []Premod   []NMES                     []Other:  []w/US   []w/ice   []w/heat  Position:  Location:       []  Traction: [] Cervical       []Lumbar                       [] Prone          []Supine []Intermittent   []Continuous Lbs:  [] before manual  [] after manual  []w/heat    []  Ultrasound: []Continuous   [] Pulsed                       at: []1MHz   []3MHz Location:  W/cm2:    [] Paraffin         Location:   []w/heat   10 []  Ice     [x]  Heat pre treatment  []  Ice massage Position:seated  Location:neck     []  Laser  []  Other: Position:  Location:      []  Vasopneumatic Device Pressure:       [] lo [] med [] hi   Temperature:      [x] Skin assessment post-treatment:  [x]intact []redness- no adverse reaction    []redness  adverse reaction:     15 min Therapeutic Exercise:  [x] See flow sheet : progressed per flow sheet   Rationale: increase ROM to improve the patients ability to perform ADLs      15 min Manual Therapy: STM L UT    Rationale: decrease pain, increase ROM and increase tissue extensibility to improve the patients ability to perform ADLs, sleep              With   [] TE   [] TA   [] neuro   [] other: Patient Education: [x] Review HEP    [] Progressed/Changed HEP based on:   [] positioning   [] body mechanics   [] transfers   [] heat/ice application    [] other:      Other Objective/Functional Measures: nt     Pain Level (0-10 scale) post treatment: 0    ASSESSMENT/Changes in Function:   Patient is progressing with decreased pain and increased cervical ROM. Patient will continue to benefit from skilled PT services to modify and progress therapeutic interventions, address functional mobility deficits, address ROM deficits, address strength deficits, analyze and address soft tissue restrictions, analyze and cue movement patterns, analyze and modify body mechanics/ergonomics and assess and modify postural abnormalities to attain remaining goals. Progress towards goals / Updated goals:  Short Term Goals: To be accomplished in 2 weeks:               1. Patient will be I in HEP to promote self management of symptoms.  MET              2. Patient will report pain level at worst as less than or equal to 6/10 so they can be performed without pain. MET  Long Term Goals: To be accomplished in 4-6 weeks:               1.  Patient will report pain level at worst as less than or equal to 4/10 so they can be performed without pain. MET              2. Patient will have B cervical rotation AROM > or = 50 degrees pain free to assist with driving. PROGRESSING              3.Patient will be able to sleep > or = 6.5 hrs without waking secondary to neck or L UE pain.  PROGRESSING        PLAN  []  Upgrade activities as tolerated     [x]  Continue plan of care 1 visit in 2 weeks   []  Update interventions per flow sheet       []  Discharge due to:_  []  Other:_      Michael Forbes, PT 7/22/2019

## 2019-08-05 ENCOUNTER — HOSPITAL ENCOUNTER (OUTPATIENT)
Dept: PHYSICAL THERAPY | Age: 74
End: 2019-08-05

## 2019-08-05 NOTE — ANCILLARY DISCHARGE INSTRUCTIONS
[facility] [address] [facility], [address] [facility] Phone: [phone] Fax: [phone] Discharge Summary 2-15 Patient name: Gatito Wiggins  : 1945  Provider#: [id number] Referral source: Kavin Mora NP Medical/Treatment Diagnosis: Neck pain [M54.2] Prior Hospitalization: see medical history Comorbidities: osteoporosis, DM, OA, HTN, scoliosis, B DIDIER, breast CA 15 years ago, toe fusions Prior Level of Function:Intermittent neck pain, activity based Medications: Verified on Patient Summary List 
 
Start of Care: 19      Onset Date:2019 Visits from Start of Care: 7     Missed Visits: 0 Reporting Period : 19 to 19 Short Term Goals: To be accomplished in 2 weeks:  
            0. Patient will be I in HEP to promote self management of symptoms. MET 
            2. Patient will report pain level at worst as less than or equal to 6/10 so they can be performed without pain. MET Long Term Goals: To be accomplished in 4-6 weeks:  
            1.  Patient will report pain level at worst as less than or equal to 4/10 so they can be performed without pain. MET 
            2. Patient will have B cervical rotation AROM > or = 50 degrees pain free to assist with driving. MET 
            7.USXLARB will be able to sleep > or = 6.5 hrs without waking secondary to neck or L UE pain. MET 
  
 
Assessment/Summary of care: Patient was seen 7 visits. Patient returned for follow up on 19 reporting 0/10 neck pain at all times. RECOMMENDATIONS: 
[x]Discontinue therapy: [x]Patient has reached or is progressing toward set goals []Patient is non-compliant or has abdicated 
   []Due to lack of appreciable progress towards set goals Leandra Cooks, PT 2019

## 2019-11-10 DIAGNOSIS — E11.9 DIABETES TYPE 2, CONTROLLED (HCC): ICD-10-CM

## 2019-11-10 DIAGNOSIS — A60.00 GENITAL HERPES SIMPLEX, UNSPECIFIED SITE: ICD-10-CM

## 2019-11-11 RX ORDER — VALACYCLOVIR HYDROCHLORIDE 500 MG/1
TABLET, FILM COATED ORAL
Qty: 90 TAB | Refills: 0 | Status: SHIPPED | OUTPATIENT
Start: 2019-11-11 | End: 2020-03-09

## 2019-11-11 RX ORDER — METFORMIN HYDROCHLORIDE 500 MG/1
TABLET ORAL
Qty: 180 TAB | Refills: 0 | Status: SHIPPED | OUTPATIENT
Start: 2019-11-11 | End: 2020-03-09

## 2019-11-21 ENCOUNTER — HOSPITAL ENCOUNTER (EMERGENCY)
Age: 74
Discharge: HOME | End: 2019-11-21
Attending: EMERGENCY MEDICINE
Payer: MEDICARE

## 2019-11-21 VITALS
TEMPERATURE: 97.4 F | SYSTOLIC BLOOD PRESSURE: 140 MMHG | OXYGEN SATURATION: 94 % | RESPIRATION RATE: 16 BRPM | DIASTOLIC BLOOD PRESSURE: 68 MMHG | HEART RATE: 83 BPM

## 2019-11-21 DIAGNOSIS — A08.4 VIRAL GASTROENTERITIS: ICD-10-CM

## 2019-11-21 DIAGNOSIS — R11.2 NON-INTRACTABLE VOMITING WITH NAUSEA, UNSPECIFIED VOMITING TYPE: Primary | ICD-10-CM

## 2019-11-21 LAB
ALBUMIN SERPL-MCNC: 3.9 G/DL (ref 3.5–5)
ALBUMIN/GLOB SERPL: 1.1 {RATIO} (ref 1.1–2.2)
ALP SERPL-CCNC: 101 U/L (ref 45–117)
ALT SERPL-CCNC: 17 U/L (ref 12–78)
ANION GAP SERPL CALC-SCNC: 9 MMOL/L (ref 5–15)
APPEARANCE UR: CLEAR
AST SERPL-CCNC: 20 U/L (ref 15–37)
ATRIAL RATE: 87 BPM
BACTERIA URNS QL MICRO: NEGATIVE /HPF
BASOPHILS # BLD: 0 K/UL (ref 0–0.1)
BASOPHILS NFR BLD: 0 % (ref 0–1)
BILIRUB SERPL-MCNC: 0.5 MG/DL (ref 0.2–1)
BILIRUB UR QL: NEGATIVE
BUN SERPL-MCNC: 15 MG/DL (ref 6–20)
BUN/CREAT SERPL: 21 (ref 12–20)
CALCIUM SERPL-MCNC: 8.9 MG/DL (ref 8.5–10.1)
CALCULATED R AXIS, ECG10: 2 DEGREES
CALCULATED T AXIS, ECG11: 41 DEGREES
CHLORIDE SERPL-SCNC: 101 MMOL/L (ref 97–108)
CO2 SERPL-SCNC: 24 MMOL/L (ref 21–32)
COLOR UR: ABNORMAL
COMMENT, HOLDF: NORMAL
CREAT SERPL-MCNC: 0.7 MG/DL (ref 0.55–1.02)
DIAGNOSIS, 93000: NORMAL
DIFFERENTIAL METHOD BLD: ABNORMAL
EOSINOPHIL # BLD: 0 K/UL (ref 0–0.4)
EOSINOPHIL NFR BLD: 0 % (ref 0–7)
EPITH CASTS URNS QL MICRO: ABNORMAL /LPF
ERYTHROCYTE [DISTWIDTH] IN BLOOD BY AUTOMATED COUNT: 12.6 % (ref 11.5–14.5)
FLUAV AG NPH QL IA: NEGATIVE
FLUBV AG NOSE QL IA: NEGATIVE
GLOBULIN SER CALC-MCNC: 3.7 G/DL (ref 2–4)
GLUCOSE SERPL-MCNC: 151 MG/DL (ref 65–100)
GLUCOSE UR STRIP.AUTO-MCNC: NEGATIVE MG/DL
HCT VFR BLD AUTO: 41.9 % (ref 35–47)
HGB BLD-MCNC: 14.1 G/DL (ref 11.5–16)
HGB UR QL STRIP: ABNORMAL
HYALINE CASTS URNS QL MICRO: ABNORMAL /LPF (ref 0–5)
IMM GRANULOCYTES # BLD AUTO: 0 K/UL (ref 0–0.04)
IMM GRANULOCYTES NFR BLD AUTO: 0 % (ref 0–0.5)
KETONES UR QL STRIP.AUTO: ABNORMAL MG/DL
LEUKOCYTE ESTERASE UR QL STRIP.AUTO: NEGATIVE
LYMPHOCYTES # BLD: 1 K/UL (ref 0.8–3.5)
LYMPHOCYTES NFR BLD: 13 % (ref 12–49)
MCH RBC QN AUTO: 32 PG (ref 26–34)
MCHC RBC AUTO-ENTMCNC: 33.7 G/DL (ref 30–36.5)
MCV RBC AUTO: 95.2 FL (ref 80–99)
MONOCYTES # BLD: 0.3 K/UL (ref 0–1)
MONOCYTES NFR BLD: 4 % (ref 5–13)
NEUTS SEG # BLD: 6.7 K/UL (ref 1.8–8)
NEUTS SEG NFR BLD: 83 % (ref 32–75)
NITRITE UR QL STRIP.AUTO: NEGATIVE
NRBC # BLD: 0 K/UL (ref 0–0.01)
NRBC BLD-RTO: 0 PER 100 WBC
P-R INTERVAL, ECG05: 144 MS
PH UR STRIP: 7 [PH] (ref 5–8)
PLATELET # BLD AUTO: 272 K/UL (ref 150–400)
PMV BLD AUTO: 10.1 FL (ref 8.9–12.9)
POTASSIUM SERPL-SCNC: 3.9 MMOL/L (ref 3.5–5.1)
PROT SERPL-MCNC: 7.6 G/DL (ref 6.4–8.2)
PROT UR STRIP-MCNC: NEGATIVE MG/DL
Q-T INTERVAL, ECG07: 354 MS
QRS DURATION, ECG06: 82 MS
QTC CALCULATION (BEZET), ECG08: 425 MS
RBC # BLD AUTO: 4.4 M/UL (ref 3.8–5.2)
RBC #/AREA URNS HPF: ABNORMAL /HPF (ref 0–5)
SAMPLES BEING HELD,HOLD: NORMAL
SODIUM SERPL-SCNC: 134 MMOL/L (ref 136–145)
SP GR UR REFRACTOMETRY: 1.02 (ref 1–1.03)
TROPONIN I SERPL-MCNC: <0.05 NG/ML
UR CULT HOLD, URHOLD: NORMAL
UROBILINOGEN UR QL STRIP.AUTO: 0.2 EU/DL (ref 0.2–1)
VENTRICULAR RATE, ECG03: 87 BPM
WBC # BLD AUTO: 8.1 K/UL (ref 3.6–11)
WBC URNS QL MICRO: ABNORMAL /HPF (ref 0–4)

## 2019-11-21 PROCEDURE — 87804 INFLUENZA ASSAY W/OPTIC: CPT

## 2019-11-21 PROCEDURE — 93005 ELECTROCARDIOGRAM TRACING: CPT

## 2019-11-21 PROCEDURE — 74011250636 HC RX REV CODE- 250/636: Performed by: EMERGENCY MEDICINE

## 2019-11-21 PROCEDURE — 81001 URINALYSIS AUTO W/SCOPE: CPT

## 2019-11-21 PROCEDURE — 96375 TX/PRO/DX INJ NEW DRUG ADDON: CPT

## 2019-11-21 PROCEDURE — 99283 EMERGENCY DEPT VISIT LOW MDM: CPT

## 2019-11-21 PROCEDURE — 36415 COLL VENOUS BLD VENIPUNCTURE: CPT

## 2019-11-21 PROCEDURE — 96374 THER/PROPH/DIAG INJ IV PUSH: CPT

## 2019-11-21 PROCEDURE — 85025 COMPLETE CBC W/AUTO DIFF WBC: CPT

## 2019-11-21 PROCEDURE — 84484 ASSAY OF TROPONIN QUANT: CPT

## 2019-11-21 PROCEDURE — 80053 COMPREHEN METABOLIC PANEL: CPT

## 2019-11-21 RX ORDER — SODIUM CHLORIDE 9 MG/ML
1000 INJECTION, SOLUTION INTRAVENOUS ONCE
Status: COMPLETED | OUTPATIENT
Start: 2019-11-21 | End: 2019-11-21

## 2019-11-21 RX ORDER — ONDANSETRON 2 MG/ML
4 INJECTION INTRAMUSCULAR; INTRAVENOUS ONCE
Status: COMPLETED | OUTPATIENT
Start: 2019-11-21 | End: 2019-11-21

## 2019-11-21 RX ORDER — METOCLOPRAMIDE 10 MG/1
10 TABLET ORAL
Qty: 10 TAB | Refills: 0 | Status: SHIPPED | OUTPATIENT
Start: 2019-11-21 | End: 2020-06-08 | Stop reason: ALTCHOICE

## 2019-11-21 RX ORDER — KETOROLAC TROMETHAMINE 30 MG/ML
15 INJECTION, SOLUTION INTRAMUSCULAR; INTRAVENOUS ONCE
Status: COMPLETED | OUTPATIENT
Start: 2019-11-21 | End: 2019-11-21

## 2019-11-21 RX ORDER — METOCLOPRAMIDE HYDROCHLORIDE 5 MG/ML
10 INJECTION INTRAMUSCULAR; INTRAVENOUS
Status: COMPLETED | OUTPATIENT
Start: 2019-11-21 | End: 2019-11-21

## 2019-11-21 RX ADMIN — KETOROLAC TROMETHAMINE 15 MG: 30 INJECTION, SOLUTION INTRAMUSCULAR at 04:02

## 2019-11-21 RX ADMIN — SODIUM CHLORIDE 1000 ML: 900 INJECTION, SOLUTION INTRAVENOUS at 02:06

## 2019-11-21 RX ADMIN — METOCLOPRAMIDE 10 MG: 5 INJECTION, SOLUTION INTRAMUSCULAR; INTRAVENOUS at 03:48

## 2019-11-21 RX ADMIN — ONDANSETRON 4 MG: 2 INJECTION INTRAMUSCULAR; INTRAVENOUS at 02:09

## 2019-11-21 NOTE — ED PROVIDER NOTES
72-year-old female presents to the emergency room for evaluation of nausea and vomiting. Patient awoke this morning with nausea and body aches. Patient states that 11 AM he started to vomit. Patient has had intermittent episodes of vomiting and nausea through the day. She reports approximately 8 episodes of nonbloody nonbilious emesis. Last episode was 20 minutes prior to arrival.  Patient tried Zofran at 2 PM and then at 10 PM.  This provided no relief. She denies associated dysuria frequency or urgency. No abdominal pain. No diarrhea. No chest pain, shortness of breath or difficulty breathing. No headaches. No unusual foods. No known sick contacts. No known precipitating events.     Social hx    Nonsmoker  +alcohol           Past Medical History:   Diagnosis Date    Breast cancer [facility]) 2003    lumpectomy + radiation right breast    Diabetes mellitus, controlled ([facility] [address] 9/3/2015    Encounter for screening mammogram for breast cancer 11/20/2018    Dr. Valdo Adler - normal - repeat in 1 year     Genital herpes 3/10/2010    GERD (gastroesophageal reflux disease)     HTN, goal below 140/90 3/8/2010    Hyperlipemia 11/26/2014    Osteopenia 03/10/2010    Reclast    Screening mammogram, encounter for 09/17/2018    Dr. Valdo Adler - possible mass on right breast        Past Surgical History:   Procedure Laterality Date    ENDOSCOPY, COLON, DIAGNOSTIC  2000    HX APPENDECTOMY      HX BREAST LUMPECTOMY      R breast - lumpectomy + radiation - on arimidex x 5 years - Dr. Anni Espinoza HX LITHOTRIPSY  2006    kidney stone    HX ORTHOPAEDIC  [id number]    bilat hips replaced - needs keflex prior to dental procedure    HX ORTHOPAEDIC      bilateral foot fusion of great hallux. hammer toe    HX TONSILLECTOMY           Family History:   Problem Relation Age of Onset    Arthritis-osteo Mother     Stroke Father     Arthritis-osteo Maternal Grandmother        Social History     Socioeconomic History    Marital status:      Spouse name: Not on file    Number of children: Not on file    Years of education: Not on file    Highest education level: Not on file   Occupational History    Not on file   Social Needs    Financial resource strain: Not on file    Food insecurity:     Worry: Not on file     Inability: Not on file    Transportation needs:     Medical: Not on file     Non-medical: Not on file   Tobacco Use    Smoking status: Never Smoker    Smokeless tobacco: Never Used   Substance and Sexual Activity    Alcohol use: Yes     Comment: red wine 7/wk    Drug use: No    Sexual activity: Yes     Partners: Male     Birth control/protection: Condom   Lifestyle    Physical activity:     Days per week: Not on file     Minutes per session: Not on file    Stress: Not on file   Relationships    Social connections:     Talks on phone: Not on file     Gets together: Not on file     Attends Shinto service: Not on file     Active member of club or organization: Not on file     Attends meetings of clubs or organizations: Not on file     Relationship status: Not on file    Intimate partner violence:     Fear of current or ex partner: Not on file     Emotionally abused: Not on file     Physically abused: Not on file     Forced sexual activity: Not on file   Other Topics Concern     Service No    Blood Transfusions No    Caffeine Concern No    Occupational Exposure No    Hobby Hazards No    Sleep Concern No    Stress Concern No    Weight Concern Yes     Comment: weight gain     Special Diet No    Back Care No    Exercise Yes     Comment: walks up stairs     Bike Helmet No    Seat Belt Yes    Self-Exams Yes   Social History Narrative    Not on file         ALLERGIES: Pcn [penicillins] and Femara [letrozole]    Review of Systems   Constitutional: Negative for chills and fever. Respiratory: Negative for cough and shortness of breath.     Cardiovascular: Negative for chest pain and palpitations. Gastrointestinal: Positive for nausea and vomiting. Negative for abdominal pain, blood in stool and diarrhea. Genitourinary: Negative for dysuria, flank pain, frequency and urgency. Musculoskeletal: Positive for myalgias. Negative for arthralgias, neck pain and neck stiffness. Skin: Negative for rash and wound. Neurological: Negative for dizziness, numbness and headaches. All other systems reviewed and are negative. Vitals:    11/21/19 0139   BP: (!) 150/92   Pulse: 97   Resp: 16   Temp: 98.1 °F (36.7 °C)   SpO2: 96%            Physical Exam  Vitals signs and nursing note reviewed. Constitutional:       General: She is not in acute distress. Appearance: Normal appearance. She is well-developed. She is not ill-appearing or toxic-appearing. HENT:      Head: Normocephalic and atraumatic. Right Ear: External ear normal.      Left Ear: External ear normal.      Mouth/Throat:      Mouth: Mucous membranes are moist.      Pharynx: No oropharyngeal exudate. Eyes:      Conjunctiva/sclera: Conjunctivae normal.      Pupils: Pupils are equal, round, and reactive to light. Neck:      Musculoskeletal: Normal range of motion and neck supple. Cardiovascular:      Rate and Rhythm: Normal rate and regular rhythm. Heart sounds: Normal heart sounds. Pulmonary:      Effort: Pulmonary effort is normal. No respiratory distress. Breath sounds: Normal breath sounds. No wheezing. Abdominal:      General: Bowel sounds are normal. There is no distension or abdominal bruit. Palpations: Abdomen is soft. Abdomen is not rigid. There is no shifting dullness, hepatomegaly, splenomegaly, mass or pulsatile mass. Tenderness: There is no tenderness. There is no right CVA tenderness, left CVA tenderness, guarding or rebound. Negative signs include Segovia's sign and McBurney's sign. Hernia: No hernia is present. Comments: Abdomen soft, nontender to palpation.   No peritoneal signs   Musculoskeletal: Normal range of motion. General: No tenderness. Skin:     General: Skin is warm and dry. Findings: No erythema or rash. Neurological:      Mental Status: She is alert and oriented to person, place, and time. Cranial Nerves: No cranial nerve deficit. Coordination: Coordination normal.      Deep Tendon Reflexes: Reflexes are normal and symmetric. Reflexes normal.   Psychiatric:         Behavior: Behavior normal.         Thought Content: Thought content normal.         Judgment: Judgment normal.          MDM  Number of Diagnoses or Management Options  Non-intractable vomiting with nausea, unspecified vomiting type:   Diagnosis management comments: 81-year-old female presenting to the emergency room for nausea and vomiting. Her abdomen is soft and nontender. Her lungs are clear. No wheezing. No fever. She is not tachycardic or hypoxic. She is in no acute distress. She is not ill-appearing  Plan: IV fluid, Zofran, urinalysis and labs    3:00 AM  Change of shift. Pt case including HPI, PE, and all available lab and radiology results has been discussed with attending physician, Dr. Anup Sharma. Care of patient signed over pending labs. Amount and/or Complexity of Data Reviewed  Discuss the patient with other providers: yes (ER attending-Kevan)    Patient Progress  Patient progress: stable         Procedures      ED EKG interpretation:  Rhythm: normal sinus rhythm; and regular .  Rate (approx.): 85; Axis: normal; P wave: normal; QRS interval: normal ; ST/T wave: non-specific changes;  EKG documented by Tammy Sullivan PA-C,  Sriram Carlson DO, ED MD.

## 2019-11-21 NOTE — ED TRIAGE NOTES
Arrived from home, reporting nausea, vomiting, body aches and productive cough that started around this morning. Reports vomiting 6-8x. Patient concerned for the flu    Denies blood in stool or phlegm.

## 2019-11-21 NOTE — DISCHARGE INSTRUCTIONS
Use clear liquid diet over next 12 hours. Advance as tolerated. Reglan:1 pill every 6 hours as needed for nausea. Return to ER for any fever, chills, onset of abdominal pain, chest pain, shortness of breath, difficulty breathing. Nausea and Vomiting: Care Instructions  Your Care Instructions    When you are nauseated, you may feel weak and sweaty and notice a lot of saliva in your mouth. Nausea often leads to vomiting. Most of the time you do not need to worry about nausea and vomiting, but they can be signs of other illnesses. Two common causes of nausea and vomiting are stomach flu and food poisoning. Nausea and vomiting from viral stomach flu will usually start to improve within 24 hours. Nausea and vomiting from food poisoning may last from 12 to 48 hours. The doctor has checked you carefully, but problems can develop later. If you notice any problems or new symptoms, get medical treatment right away. Follow-up care is a key part of your treatment and safety. Be sure to make and go to all appointments, and call your doctor if you are having problems. It's also a good idea to know your test results and keep a list of the medicines you take. How can you care for yourself at home? · To prevent dehydration, drink plenty of fluids, enough so that your urine is light yellow or clear like water. Choose water and other caffeine-free clear liquids until you feel better. If you have kidney, heart, or liver disease and have to limit fluids, talk with your doctor before you increase the amount of fluids you drink. · Rest in bed until you feel better. · When you are able to eat, try clear soups, mild foods, and liquids until all symptoms are gone for 12 to 48 hours. Other good choices include dry toast, crackers, cooked cereal, and gelatin dessert, such as Jell-O. When should you call for help? Call 911 anytime you think you may need emergency care.  For example, call if:    · You passed out (lost consciousness).    Call your doctor now or seek immediate medical care if:    · You have symptoms of dehydration, such as:  ? Dry eyes and a dry mouth. ? Passing only a little dark urine. ? Feeling thirstier than usual.     · You have new or worsening belly pain.     · You have a new or higher fever.     · You vomit blood or what looks like coffee grounds.    Watch closely for changes in your health, and be sure to contact your doctor if:    · You have ongoing nausea and vomiting.     · Your vomiting is getting worse.     · Your vomiting lasts longer than 2 days.     · You are not getting better as expected. Where can you learn more? Go to http://ivy-yoandy.info/. Enter 25 [id number] in the search box to learn more about \"Nausea and Vomiting: Care Instructions. \"  Current as of: June 26, 2019  Content Version: 12.2  © 3361-5301 [employer]. Care instructions adapted under license by [employer] (which disclaims liability or warranty for this information). If you have questions about a medical condition or this instruction, always ask your healthcare professional. [address] any warranty or liability for your use of this information.

## 2019-11-21 NOTE — ED NOTES
Full care assumed from Alexis Franklin, at [address] [facility] returned showing no significant abnormalities. UA negative for infection. Patient had improvement in her symptoms with Reglan    Discharged home with Reglan, recommended increased p.o. fluid intake, rest, PCP follow-up.

## 2019-12-24 LAB — MAMMOGRAPHY, EXTERNAL: NORMAL

## 2020-01-10 ENCOUNTER — HOSPITAL ENCOUNTER (OUTPATIENT)
Dept: LAB | Age: 75
Discharge: HOME | End: 2020-01-10
Payer: MEDICARE

## 2020-01-10 ENCOUNTER — OFFICE VISIT (OUTPATIENT)
Dept: FAMILY MEDICINE CLINIC | Age: 75
End: 2020-01-10

## 2020-01-10 VITALS
DIASTOLIC BLOOD PRESSURE: 78 MMHG | SYSTOLIC BLOOD PRESSURE: 120 MMHG | HEART RATE: 82 BPM | WEIGHT: 179 LBS | RESPIRATION RATE: 18 BRPM | HEIGHT: 62 IN | BODY MASS INDEX: 32.94 KG/M2 | OXYGEN SATURATION: 98 % | TEMPERATURE: 98.6 F

## 2020-01-10 DIAGNOSIS — Z00.00 MEDICARE ANNUAL WELLNESS VISIT, SUBSEQUENT: Primary | ICD-10-CM

## 2020-01-10 DIAGNOSIS — E11.9 CONTROLLED TYPE 2 DIABETES MELLITUS WITHOUT COMPLICATION, WITHOUT LONG-TERM CURRENT USE OF INSULIN (HCC): ICD-10-CM

## 2020-01-10 DIAGNOSIS — E78.5 HYPERLIPIDEMIA, UNSPECIFIED HYPERLIPIDEMIA TYPE: ICD-10-CM

## 2020-01-10 DIAGNOSIS — M81.0 OSTEOPOROSIS, UNSPECIFIED OSTEOPOROSIS TYPE, UNSPECIFIED PATHOLOGICAL FRACTURE PRESENCE: ICD-10-CM

## 2020-01-10 DIAGNOSIS — M41.9 SCOLIOSIS, UNSPECIFIED SCOLIOSIS TYPE, UNSPECIFIED SPINAL REGION: ICD-10-CM

## 2020-01-10 DIAGNOSIS — I10 HTN, GOAL BELOW 140/90: ICD-10-CM

## 2020-01-10 DIAGNOSIS — E66.01 SEVERE OBESITY (HCC): ICD-10-CM

## 2020-01-10 PROCEDURE — 36415 COLL VENOUS BLD VENIPUNCTURE: CPT

## 2020-01-10 PROCEDURE — 83036 HEMOGLOBIN GLYCOSYLATED A1C: CPT

## 2020-01-10 PROCEDURE — 82043 UR ALBUMIN QUANTITATIVE: CPT

## 2020-01-10 PROCEDURE — 80061 LIPID PANEL: CPT

## 2020-01-10 PROCEDURE — 80053 COMPREHEN METABOLIC PANEL: CPT

## 2020-01-10 RX ORDER — GABAPENTIN 300 MG/1
300 CAPSULE ORAL
COMMUNITY
Start: 2019-12-04 | End: 2022-06-06

## 2020-01-10 NOTE — PROGRESS NOTES
Chief Complaint   Patient presents with   [facility] Wellness Visit     G [id number] - fasting      1. Have you been to the ER, urgent care clinic since your last visit? Hospitalized since your last visit? No    2. Have you seen or consulted any other health care providers outside of the [address] [place] [address] since your last visit? Include any pap smears or colon screening.  No

## 2020-01-10 NOTE — PATIENT INSTRUCTIONS
Call Dr. Sheeba Garcia to set up your Reclast infusion for osteoporosis   [facility] at [address] [facility] [address] [place] [address], [address] [facility], [address], [address] [place]  [phone]    Medicare Wellness Visit, Female     The best way to live healthy is to have a lifestyle where you eat a well-balanced diet, exercise regularly, limit alcohol use, and quit all forms of tobacco/nicotine, if applicable. Regular preventive services are another way to keep healthy. Preventive services (vaccines, screening tests, monitoring & exams) can help personalize your care plan, which helps you manage your own care. Screening tests can find health problems at the earliest stages, when they are easiest to treat. Jim follows the current, evidence-based guidelines published by the [facility] ([facility]) when recommending preventive services for our patients. Because we follow these guidelines, sometimes recommendations change over time as research supports it. (For example, mammograms used to be recommended annually. Even though Medicare will still pay for an annual mammogram, the newer guidelines recommend a mammogram every two years for women of average risk). Of course, you and your doctor may decide to screen more often for some diseases, based on your risk and your co-morbidities (chronic disease you are already diagnosed with). Preventive services for you include:  - Medicare offers their members a free annual wellness visit, which is time for you and your primary care provider to discuss and plan for your preventive service needs. Take advantage of this benefit every year!  -All adults over the age of 72 should receive the recommended pneumonia vaccines.  Current USPSTF guidelines recommend a series of two vaccines for the best pneumonia protection.   -All adults should have a flu vaccine yearly and a tetanus vaccine every 10 years.   -All adults age 48 and older should receive the shingles vaccines (series of two vaccines). -All adults age 38-68 who are overweight should have a diabetes screening test once every three years.   -All adults born between [birth year] and 1963 should be screened once for Hepatitis C.  -Other screening tests and preventive services for persons with diabetes include: an eye exam to screen for diabetic retinopathy, a kidney function test, a foot exam, and stricter control over your cholesterol.   -Cardiovascular screening for adults with routine risk involves an electrocardiogram (ECG) at intervals determined by your doctor.   -Colorectal cancer screenings should be done for adults age 54-65 with no increased risk factors for colorectal cancer. There are a number of acceptable methods of screening for this type of cancer. Each test has its own benefits and drawbacks. Discuss with your doctor what is most appropriate for you during your annual wellness visit. The different tests include: colonoscopy (considered the best screening method), a fecal occult blood test, a fecal DNA test, and sigmoidoscopy.    -A bone mass density test is recommended when a woman turns 65 to screen for osteoporosis. This test is only recommended one time, as a screening. Some providers will use this same test as a disease monitoring tool if you already have osteoporosis. -Breast cancer screenings are recommended every other year for women of normal risk, age 54-69.  -Cervical cancer screenings for women over age 72 are only recommended with certain risk factors.      Here is a list of your current Health Maintenance items (your personalized list of preventive services) with a due date:  Health Maintenance Due   Topic Date Due    Hemoglobin A1C    08/22/2019    Diabetic Foot Care  08/31/2019    Albumin Urine Test  08/31/2019    Annual Well Visit  09/01/2019

## 2020-01-10 NOTE — PROGRESS NOTES
Patient Name: Ernestina Becker   MRN: [id number]    Smith Villagomez is a 69 y.o. female who presents with the following: The patient has diabetes, hypertension and hyperlipidemia. She reports taking medications as instructed, no medication side effects noted. Diet and Lifestyle: generally follows a low fat low cholesterol diet, generally follows a low sodium diet, no formal exercise but active during the day. Lab review: orders written for new lab studies as appropriate; see orders. Has severe scoliosis managed by Dr. Martha Colón at [facility]. Planning to do surgery this summer but she would like a second opinion from another orthopedic surgeon before proceeding. Has chronic back pain with right leg involvement. History of osteoporosis. Previously received 1 dose of IV Reclast with Dr. Marcio Ferrari but has not seen her in a few years. Review of Systems   Constitutional: Negative for fever, malaise/fatigue and weight loss. Respiratory: Negative for cough, hemoptysis, shortness of breath and wheezing. Cardiovascular: Negative for chest pain, palpitations, leg swelling and PND. Gastrointestinal: Negative for abdominal pain, constipation, diarrhea, nausea and vomiting. Musculoskeletal: Positive for back pain. The patient's medications, allergies, past medical history, surgical history, family history and social history were reviewed and updated where appropriate. Prior to Admission medications    Medication Sig Start Date End Date Taking? Authorizing Provider   gabapentin (NEURONTIN) 300 mg capsule Take 300 mg by mouth. Takes 2 tabs at night. 12/4/19  Yes Provider, Historical   metoclopramide HCl (REGLAN) 10 mg tablet Take 1 Tab by mouth every six (6) hours as needed for Nausea.  11/21/19  Yes Chavo Cazares PA-C   valACYclovir (VALTREX) 500 mg tablet TAKE 1 TABLET BY MOUTH  DAILY 11/11/19  Yes Aixa Paz MD   metFORMIN (GLUCOPHAGE) 500 mg tablet TAKE 1 TABLET BY MOUTH TWO TIMES DAILY WITH MEALS  Patient taking differently: Take 500 mg by mouth two (2) times daily (with meals). 11/11/19  Yes Aixa Paz MD   olmesartan (BENICAR) 40 mg tablet TAKE ONE TABLET BY MOUTH DAILY FOR BLOOD PRESSURE  Patient taking differently: Take 40 mg by mouth daily. 6/18/19  Yes Aixa Paz MD   cyclobenzaprine (FLEXERIL) 5 mg tablet Take 1 Tab by mouth nightly. 6/4/19  Yes Paulie Quintanilla NP   atorvastatin (LIPITOR) 40 mg tablet TAKE 1 TABLET BY MOUTH  DAILY  Patient taking differently: Take 40 mg by mouth daily. 5/6/19  Yes Aixa Paz MD   SPIRIVA RESPIMAT 2.5 mcg/actuation inhaler Take  by inhalation as needed. 6/26/18  Yes Provider, Historical   TURMERIC, BULK, by Does Not Apply route. 1500mg, 140mg , 10mg   Yes Provider, Historical   budesonide-formoterol (SYMBICORT) 160-4.5 mcg/actuation HFAA Take 2 Puffs by inhalation two (2) times a day. Patient taking differently: Take 2 Puffs by inhalation as needed. 3/8/18  Yes Nesha Jackson NP   albuterol (PROVENTIL HFA, VENTOLIN HFA, PROAIR HFA) 90 mcg/actuation inhaler Take 2 Puffs by inhalation every six (6) hours as needed for Wheezing. 2/27/18  Yes Nesha Jackson NP   calcium-cholecalciferol, D3, (CALTRATE 600+D) tablet Take 1 Tab by mouth daily. Yes Provider, Historical   multivitamin (ONE A DAY) tablet Take 1 Tab by mouth daily. Yes Provider, Historical   OMEPRAZOLE MAGNESIUM (PRILOSEC OTC PO) Take 1 Tab by mouth daily. Yes Provider, Historical   cannabidiol, CBD, extract 100 mg/mL soln by SubLINGual route daily. Provider, Historical   fish oil/borage/flax/om3,6,9 1 (OMEGA 3-6-9 COMPLEX PO) Take  by mouth.  Hemp Oil  1/10/20  Provider, Historical       Allergies   Allergen Reactions    Pcn [Penicillins] Itching     Lips itching    Femara [Letrozole] Other (comments)     htn         OBJECTIVE    Visit Vitals  /78 (BP 1 Location: Left arm, BP Patient Position: Sitting)   Pulse 82   Temp 98.6 °F (37 °C) (Oral)   Resp 18   Ht 5' 2.25\" (1.581 m)   Wt 179 lb (81.2 kg)   SpO2 98%   BMI 32.48 kg/m²       Physical Exam  Vitals signs and nursing note reviewed. Constitutional:       General: She is not in acute distress. Appearance: She is not diaphoretic. Eyes:      Conjunctiva/sclera: Conjunctivae normal.      Pupils: Pupils are equal, round, and reactive to light. Cardiovascular:      Rate and Rhythm: Normal rate and regular rhythm. Heart sounds: Normal heart sounds. No murmur. No friction rub. No gallop. Pulmonary:      Effort: Pulmonary effort is normal. No respiratory distress. Breath sounds: Normal breath sounds. No wheezing. Skin:     General: Skin is warm and dry. Findings: No rash. Neurological:      Mental Status: She is alert and oriented to person, place, and time. Psychiatric:         Mood and Affect: Mood and affect normal.         Cognition and Memory: Memory normal.         Judgment: Judgment normal.           ASSESSMENT AND PLAN  Akin Ramirez is a 69 y.o. female who presents today for:    1. Medicare annual wellness visit, subsequent  See other note. 2. Controlled type 2 diabetes mellitus without complication, without long-term current use of insulin (HCC)  Stable, continue current treatment pending review of labs. - HEMOGLOBIN A1C WITH EAG  - MICROALBUMIN, UR, RAND W/ MICROALB/CREAT RATIO    3. HTN, goal below 140/90  Stable, continue current treatment. 4. Hyperlipidemia, unspecified hyperlipidemia type  - METABOLIC PANEL, COMPREHENSIVE  - LIPID PANEL    5. Severe obesity ([address]  I have reviewed/discussed the above normal BMI with the patient. I have recommended the following interventions: dietary management education, guidance, and counseling, encourage exercise, monitor weight and prescribed dietary intake.      6. Scoliosis, unspecified scoliosis type, unspecified spinal region  - REFERRAL TO ORTHOPEDIC SURGERY    7. Osteoporosis, unspecified osteoporosis type, unspecified pathological fracture presence  Pt to f/u with Dr. Nata Wray for Reclast.       Medications Discontinued During This Encounter   Medication Reason    fish oil/borage/flax/om3,6,9 1 (OMEGA 3-6-9 COMPLEX PO)        Follow-up and Dispositions    · Return in about 7 months (around 8/10/2020) for DM follow up. Medication risks/benefits/costs/interactions/alternatives discussed with patient. Advised patient to call back or return to office if symptoms worsen/change/persist. If patient cannot reach us or should anything more severe/urgent arise he/she should proceed directly to the nearest emergency department. Discussed expected course/resolution/complications of diagnosis in detail with patient. Patient given a written after visit summary which includes his/her diagnoses, current medications and vitals. Patient expressed understanding with the diagnosis and plan. Kristel Leon M.D.

## 2020-01-10 NOTE — PROGRESS NOTES
This is the Subsequent Medicare Annual Wellness Exam, performed 12 months or more after the Initial AWV or the last Subsequent AWV    I have reviewed the patient's medical history in detail and updated the computerized patient record. History     Patient Active Problem List   Diagnosis Code    HTN, goal below 140/90 I10    Osteopenia M85.80    Genital herpes A60.00    Hyperlipemia E78.5    Diabetes mellitus, controlled ([facility] [address] E11.9    GERD (gastroesophageal reflux disease) K21.9    Malignant neoplasm of right female breast ([facility] [address] C50.911    Obesity, Class I, BMI 30-34.9 E66.9    Severe obesity ([address] E66.01    Osteoporosis M81.0     Past Medical History:   Diagnosis Date    Breast cancer ([facility] [address] 2003    lumpectomy + radiation right breast    Diabetes mellitus, controlled ([facility] [address] 9/3/2015    Encounter for screening mammogram for breast cancer 11/20/2018    Dr. Dex Sy - normal - repeat in 1 year     Genital herpes 3/10/2010    GERD (gastroesophageal reflux disease)     HTN, goal below 140/90 3/8/2010    Hyperlipemia 11/26/2014    Osteopenia 03/10/2010    Reclast    Screening mammogram, encounter for 09/17/2018    Dr. Dex Sy - possible mass on right breast     Screening mammogram, encounter for 12/24/2019    normal - repeat in 1 year      Past Surgical History:   Procedure Laterality Date    ENDOSCOPY, COLON, DIAGNOSTIC  2000    HX APPENDECTOMY      HX BREAST LUMPECTOMY      R breast - lumpectomy + radiation - on arimidex x 5 years - Dr. Milton Kelly HX LITHOTRIPSY  2006    kidney stone    HX ORTHOPAEDIC  [id number]    bilat hips replaced - needs keflex prior to dental procedure    HX ORTHOPAEDIC      bilateral foot fusion of great hallux. hammer toe    HX TONSILLECTOMY       Current Outpatient Medications   Medication Sig Dispense Refill    gabapentin (NEURONTIN) 300 mg capsule Take 300 mg by mouth. Takes 2 tabs at night.       metoclopramide HCl (REGLAN) 10 mg tablet Take 1 Tab by mouth every six (6) hours as needed for Nausea. 10 Tab 0    valACYclovir (VALTREX) 500 mg tablet TAKE 1 TABLET BY MOUTH  DAILY 90 Tab 0    metFORMIN (GLUCOPHAGE) 500 mg tablet TAKE 1 TABLET BY MOUTH TWO  TIMES DAILY WITH MEALS (Patient taking differently: Take 500 mg by mouth two (2) times daily (with meals). ) 180 Tab 0    olmesartan (BENICAR) 40 mg tablet TAKE ONE TABLET BY MOUTH DAILY FOR BLOOD PRESSURE (Patient taking differently: Take 40 mg by mouth daily.) 90 Tab 1    cyclobenzaprine (FLEXERIL) 5 mg tablet Take 1 Tab by mouth nightly. 15 Tab 0    atorvastatin (LIPITOR) 40 mg tablet TAKE 1 TABLET BY MOUTH  DAILY (Patient taking differently: Take 40 mg by mouth daily.) 90 Tab 3    SPIRIVA RESPIMAT 2.5 mcg/actuation inhaler Take  by inhalation as needed.  TURMERIC, BULK, by Does Not Apply route. 1500mg, 140mg , 10mg      budesonide-formoterol (SYMBICORT) 160-4.5 mcg/actuation HFAA Take 2 Puffs by inhalation two (2) times a day. (Patient taking differently: Take 2 Puffs by inhalation as needed.) 1 Inhaler 5    albuterol (PROVENTIL HFA, VENTOLIN HFA, PROAIR HFA) 90 mcg/actuation inhaler Take 2 Puffs by inhalation every six (6) hours as needed for Wheezing. 1 Inhaler 2    calcium-cholecalciferol, D3, (CALTRATE 600+D) tablet Take 1 Tab by mouth daily.  multivitamin (ONE A DAY) tablet Take 1 Tab by mouth daily.  OMEPRAZOLE MAGNESIUM (PRILOSEC OTC PO) Take 1 Tab by mouth daily.  cannabidiol, CBD, extract 100 mg/mL soln by SubLINGual route daily.        Allergies   Allergen Reactions    Pcn [Penicillins] Itching     Lips itching    Femara [Letrozole] Other (comments)     htn       Family History   Problem Relation Age of Onset   Dulcynea.Jelani Arthritis-osteo Mother     Stroke Father     Arthritis-osteo Maternal Grandmother      Social History     Tobacco Use    Smoking status: Never Smoker    Smokeless tobacco: Never Used   Substance Use Topics    Alcohol use: Yes     Comment: red wine 7/wk       Depression Risk Factor Screening:     3 most recent PHQ Screens 1/10/2020   Little interest or pleasure in doing things Not at all   Feeling down, depressed, irritable, or hopeless Not at all   Total Score PHQ 2 0       Alcohol Risk Factor Screening:   Do you average 1 drink per night or more than 7 drinks a week:  No    On any one occasion in the past three months have you have had more than 3 drinks containing alcohol:  No      Functional Ability and Level of Safety:   Hearing: Hearing is good. Activities of Daily Living: The home contains: no safety equipment. Patient does total self care    Ambulation: with no difficulty    Fall Risk:  Fall Risk Assessment, last 12 mths 1/10/2020   Able to walk? Yes   Fall in past 12 months? No   Fall with injury? -   Number of falls in past 12 months -   Fall Risk Score -       Abuse Screen:  Patient is not abused    Cognitive Screening   Has your family/caregiver stated any concerns about your memory: no      Patient Care Team   Patient Care Team:  Isamar Felix MD as PCP - General (Family Practice)  Isamar Felix MD as PCP - [facility] Empaneled Provider  Linda Lockett MD (Nephrology)  Hussain Holloway MD (Orthopedic Surgery)    Assessment/Plan   Education and counseling provided:  Are appropriate based on today's review and evaluation    Diagnoses and all orders for this visit:    1. Medicare annual wellness visit, subsequent    2. Controlled type 2 diabetes mellitus without complication, without long-term current use of insulin (HCC)  -     HEMOGLOBIN A1C WITH EAG  -     MICROALBUMIN, UR, RAND W/ MICROALB/CREAT RATIO    3. HTN, goal below 140/90    4. Hyperlipidemia, unspecified hyperlipidemia type  -     METABOLIC PANEL, COMPREHENSIVE  -     LIPID PANEL    5. Severe obesity ([address]    6.  Scoliosis, unspecified scoliosis type, unspecified spinal region  -     REFERRAL TO ORTHOPEDIC SURGERY    7. Osteoporosis, unspecified osteoporosis type, unspecified pathological fracture presence        Health Maintenance Due   Topic Date Due    HEMOGLOBIN A1C Q6M  08/22/2019    FOOT EXAM Q1  08/31/2019    MICROALBUMIN Q1  08/31/2019    MEDICARE YEARLY EXAM  09/01/2019

## 2020-01-10 NOTE — ACP (ADVANCE CARE PLANNING)
Advance Care Planning:   Patient was offered the opportunity to discuss advance care planning:  yes     Does patient have an Advance Directive:  yes   If no, did you provide information on [facility]?   Pt to bring copy

## 2020-01-11 LAB
ALBUMIN SERPL-MCNC: 4.1 G/DL (ref 3.5–4.8)
ALBUMIN/CREAT UR: 4.7 MG/G CREAT (ref 0–30)
ALBUMIN/GLOB SERPL: 2 {RATIO} (ref 1.2–2.2)
ALP SERPL-CCNC: 86 IU/L (ref 39–117)
ALT SERPL-CCNC: 11 IU/L (ref 0–32)
AST SERPL-CCNC: 15 IU/L (ref 0–40)
BILIRUB SERPL-MCNC: 0.5 MG/DL (ref 0–1.2)
BUN SERPL-MCNC: 20 MG/DL (ref 8–27)
BUN/CREAT SERPL: 26 (ref 12–28)
CALCIUM SERPL-MCNC: 9.5 MG/DL (ref 8.7–10.3)
CHLORIDE SERPL-SCNC: 103 MMOL/L (ref 96–106)
CHOLEST SERPL-MCNC: 161 MG/DL (ref 100–199)
CO2 SERPL-SCNC: 22 MMOL/L (ref 20–29)
CREAT SERPL-MCNC: 0.78 MG/DL (ref 0.57–1)
CREAT UR-MCNC: 121.2 MG/DL
EST. AVERAGE GLUCOSE BLD GHB EST-MCNC: 131 MG/DL
GLOBULIN SER CALC-MCNC: 2.1 G/DL (ref 1.5–4.5)
GLUCOSE SERPL-MCNC: 115 MG/DL (ref 65–99)
HBA1C MFR BLD: 6.2 % (ref 4.8–5.6)
HDLC SERPL-MCNC: 65 MG/DL
INTERPRETATION, 910389: NORMAL
LDLC SERPL CALC-MCNC: 77 MG/DL (ref 0–99)
MICROALBUMIN UR-MCNC: 5.7 UG/ML
POTASSIUM SERPL-SCNC: 4.7 MMOL/L (ref 3.5–5.2)
PROT SERPL-MCNC: 6.2 G/DL (ref 6–8.5)
SODIUM SERPL-SCNC: 141 MMOL/L (ref 134–144)
TRIGL SERPL-MCNC: 96 MG/DL (ref 0–149)
VLDLC SERPL CALC-MCNC: 19 MG/DL (ref 5–40)

## 2020-01-13 NOTE — PROGRESS NOTES
Dear Ms. Devine,    I wanted to follow up on your recent test results: Your labs are stable; please continue your current medications.

## 2020-05-04 LAB — HBA1C MFR BLD HPLC: 6.6 %

## 2020-05-18 ENCOUNTER — HOSPITAL ENCOUNTER (OUTPATIENT)
Dept: REHABILITATION | Age: 75
End: 2020-06-02
Attending: PHYSICAL MEDICINE & REHABILITATION | Admitting: PHYSICAL MEDICINE & REHABILITATION

## 2020-05-18 LAB
APPEARANCE UR: CLEAR
BACTERIA URNS QL MICRO: NEGATIVE /HPF
BILIRUB UR QL: NEGATIVE
COLOR UR: NORMAL
EPITH CASTS URNS QL MICRO: NORMAL /LPF
GLUCOSE UR STRIP.AUTO-MCNC: NEGATIVE MG/DL
HGB UR QL STRIP: NEGATIVE
HYALINE CASTS URNS QL MICRO: NORMAL /LPF (ref 0–5)
KETONES UR QL STRIP.AUTO: NEGATIVE MG/DL
LEUKOCYTE ESTERASE UR QL STRIP.AUTO: NEGATIVE
NITRITE UR QL STRIP.AUTO: NEGATIVE
PH UR STRIP: 7.5 [PH] (ref 5–8)
PROT UR STRIP-MCNC: NEGATIVE MG/DL
RBC #/AREA URNS HPF: NORMAL /HPF (ref 0–5)
SP GR UR REFRACTOMETRY: 1.01 (ref 1–1.03)
UA: UC IF INDICATED,UAUC: NORMAL
UROBILINOGEN UR QL STRIP.AUTO: 0.2 EU/DL (ref 0.2–1)
WBC URNS QL MICRO: NORMAL /HPF (ref 0–4)

## 2020-05-18 PROCEDURE — 81001 URINALYSIS AUTO W/SCOPE: CPT

## 2020-05-19 LAB
25(OH)D3 SERPL-MCNC: 27.9 NG/ML (ref 30–100)
ALBUMIN SERPL-MCNC: 2.5 G/DL (ref 3.5–5)
ALBUMIN/GLOB SERPL: 0.7 {RATIO} (ref 1.1–2.2)
ALP SERPL-CCNC: 70 U/L (ref 45–117)
ALT SERPL-CCNC: 9 U/L (ref 12–78)
ANION GAP SERPL CALC-SCNC: 7 MMOL/L (ref 5–15)
AST SERPL-CCNC: 17 U/L (ref 15–37)
BASOPHILS # BLD: 0 K/UL (ref 0–0.1)
BASOPHILS NFR BLD: 0 % (ref 0–1)
BILIRUB SERPL-MCNC: 0.5 MG/DL (ref 0.2–1)
BUN SERPL-MCNC: 8 MG/DL (ref 6–20)
BUN/CREAT SERPL: 14 (ref 12–20)
CALCIUM SERPL-MCNC: 8.6 MG/DL (ref 8.5–10.1)
CHLORIDE SERPL-SCNC: 102 MMOL/L (ref 97–108)
CO2 SERPL-SCNC: 25 MMOL/L (ref 21–32)
CREAT SERPL-MCNC: 0.56 MG/DL (ref 0.55–1.02)
DIFFERENTIAL METHOD BLD: ABNORMAL
EOSINOPHIL # BLD: 0.1 K/UL (ref 0–0.4)
EOSINOPHIL NFR BLD: 2 % (ref 0–7)
ERYTHROCYTE [DISTWIDTH] IN BLOOD BY AUTOMATED COUNT: 13.7 % (ref 11.5–14.5)
GLOBULIN SER CALC-MCNC: 3.7 G/DL (ref 2–4)
GLUCOSE SERPL-MCNC: 118 MG/DL (ref 65–100)
HCT VFR BLD AUTO: 27.3 % (ref 35–47)
HGB BLD-MCNC: 9 G/DL (ref 11.5–16)
IMM GRANULOCYTES # BLD AUTO: 0.1 K/UL (ref 0–0.04)
IMM GRANULOCYTES NFR BLD AUTO: 1 % (ref 0–0.5)
LYMPHOCYTES # BLD: 2 K/UL (ref 0.8–3.5)
LYMPHOCYTES NFR BLD: 23 % (ref 12–49)
MCH RBC QN AUTO: 31 PG (ref 26–34)
MCHC RBC AUTO-ENTMCNC: 33 G/DL (ref 30–36.5)
MCV RBC AUTO: 94.1 FL (ref 80–99)
MONOCYTES # BLD: 0.8 K/UL (ref 0–1)
MONOCYTES NFR BLD: 9 % (ref 5–13)
NEUTS SEG # BLD: 5.7 K/UL (ref 1.8–8)
NEUTS SEG NFR BLD: 65 % (ref 32–75)
NRBC # BLD: 0 K/UL (ref 0–0.01)
NRBC BLD-RTO: 0 PER 100 WBC
PLATELET # BLD AUTO: 286 K/UL (ref 150–400)
PMV BLD AUTO: 9.8 FL (ref 8.9–12.9)
POTASSIUM SERPL-SCNC: 3.6 MMOL/L (ref 3.5–5.1)
PROT SERPL-MCNC: 6.2 G/DL (ref 6.4–8.2)
RBC # BLD AUTO: 2.9 M/UL (ref 3.8–5.2)
SODIUM SERPL-SCNC: 134 MMOL/L (ref 136–145)
WBC # BLD AUTO: 8.7 K/UL (ref 3.6–11)

## 2020-05-19 PROCEDURE — 36415 COLL VENOUS BLD VENIPUNCTURE: CPT

## 2020-05-19 PROCEDURE — 85025 COMPLETE CBC W/AUTO DIFF WBC: CPT

## 2020-05-19 PROCEDURE — 80053 COMPREHEN METABOLIC PANEL: CPT

## 2020-05-19 PROCEDURE — 82306 VITAMIN D 25 HYDROXY: CPT

## 2020-05-21 LAB
ANION GAP SERPL CALC-SCNC: 6 MMOL/L (ref 5–15)
BUN SERPL-MCNC: 13 MG/DL (ref 6–20)
BUN/CREAT SERPL: 20 (ref 12–20)
CALCIUM SERPL-MCNC: 8.4 MG/DL (ref 8.5–10.1)
CHLORIDE SERPL-SCNC: 99 MMOL/L (ref 97–108)
CO2 SERPL-SCNC: 28 MMOL/L (ref 21–32)
CREAT SERPL-MCNC: 0.65 MG/DL (ref 0.55–1.02)
ERYTHROCYTE [DISTWIDTH] IN BLOOD BY AUTOMATED COUNT: 13.4 % (ref 11.5–14.5)
GLUCOSE SERPL-MCNC: 110 MG/DL (ref 65–100)
HCT VFR BLD AUTO: 25.9 % (ref 35–47)
HGB BLD-MCNC: 8.6 G/DL (ref 11.5–16)
MCH RBC QN AUTO: 30.9 PG (ref 26–34)
MCHC RBC AUTO-ENTMCNC: 33.2 G/DL (ref 30–36.5)
MCV RBC AUTO: 93.2 FL (ref 80–99)
NRBC # BLD: 0 K/UL (ref 0–0.01)
NRBC BLD-RTO: 0 PER 100 WBC
PLATELET # BLD AUTO: 308 K/UL (ref 150–400)
PMV BLD AUTO: 9.4 FL (ref 8.9–12.9)
POTASSIUM SERPL-SCNC: 3.6 MMOL/L (ref 3.5–5.1)
RBC # BLD AUTO: 2.78 M/UL (ref 3.8–5.2)
SODIUM SERPL-SCNC: 133 MMOL/L (ref 136–145)
WBC # BLD AUTO: 6.8 K/UL (ref 3.6–11)

## 2020-05-21 PROCEDURE — 36415 COLL VENOUS BLD VENIPUNCTURE: CPT

## 2020-05-21 PROCEDURE — 80048 BASIC METABOLIC PNL TOTAL CA: CPT

## 2020-05-21 PROCEDURE — 85027 COMPLETE CBC AUTOMATED: CPT

## 2020-05-25 LAB
ANION GAP SERPL CALC-SCNC: 4 MMOL/L (ref 5–15)
BUN SERPL-MCNC: 13 MG/DL (ref 6–20)
BUN/CREAT SERPL: 20 (ref 12–20)
CALCIUM SERPL-MCNC: 8.4 MG/DL (ref 8.5–10.1)
CHLORIDE SERPL-SCNC: 99 MMOL/L (ref 97–108)
CO2 SERPL-SCNC: 28 MMOL/L (ref 21–32)
CREAT SERPL-MCNC: 0.66 MG/DL (ref 0.55–1.02)
ERYTHROCYTE [DISTWIDTH] IN BLOOD BY AUTOMATED COUNT: 13.2 % (ref 11.5–14.5)
GLUCOSE SERPL-MCNC: 108 MG/DL (ref 65–100)
HCT VFR BLD AUTO: 27.5 % (ref 35–47)
HGB BLD-MCNC: 8.9 G/DL (ref 11.5–16)
MCH RBC QN AUTO: 30.9 PG (ref 26–34)
MCHC RBC AUTO-ENTMCNC: 32.4 G/DL (ref 30–36.5)
MCV RBC AUTO: 95.5 FL (ref 80–99)
NRBC # BLD: 0 K/UL (ref 0–0.01)
NRBC BLD-RTO: 0 PER 100 WBC
PLATELET # BLD AUTO: 321 K/UL (ref 150–400)
PMV BLD AUTO: 9.4 FL (ref 8.9–12.9)
POTASSIUM SERPL-SCNC: 4.2 MMOL/L (ref 3.5–5.1)
RBC # BLD AUTO: 2.88 M/UL (ref 3.8–5.2)
SODIUM SERPL-SCNC: 131 MMOL/L (ref 136–145)
WBC # BLD AUTO: 8.3 K/UL (ref 3.6–11)

## 2020-05-25 PROCEDURE — 36415 COLL VENOUS BLD VENIPUNCTURE: CPT

## 2020-05-25 PROCEDURE — 85027 COMPLETE CBC AUTOMATED: CPT

## 2020-05-25 PROCEDURE — 80048 BASIC METABOLIC PNL TOTAL CA: CPT

## 2020-05-26 LAB
ANION GAP SERPL CALC-SCNC: 5 MMOL/L (ref 5–15)
BUN SERPL-MCNC: 9 MG/DL (ref 6–20)
BUN/CREAT SERPL: 15 (ref 12–20)
CALCIUM SERPL-MCNC: 8.3 MG/DL (ref 8.5–10.1)
CHLORIDE SERPL-SCNC: 100 MMOL/L (ref 97–108)
CO2 SERPL-SCNC: 28 MMOL/L (ref 21–32)
CREAT SERPL-MCNC: 0.59 MG/DL (ref 0.55–1.02)
GLUCOSE SERPL-MCNC: 98 MG/DL (ref 65–100)
POTASSIUM SERPL-SCNC: 4.5 MMOL/L (ref 3.5–5.1)
SODIUM SERPL-SCNC: 133 MMOL/L (ref 136–145)

## 2020-05-26 PROCEDURE — 36415 COLL VENOUS BLD VENIPUNCTURE: CPT

## 2020-05-26 PROCEDURE — 80048 BASIC METABOLIC PNL TOTAL CA: CPT

## 2020-05-28 LAB
ANION GAP SERPL CALC-SCNC: 9 MMOL/L (ref 5–15)
BUN SERPL-MCNC: 11 MG/DL (ref 6–20)
BUN/CREAT SERPL: 17 (ref 12–20)
CALCIUM SERPL-MCNC: 8.8 MG/DL (ref 8.5–10.1)
CHLORIDE SERPL-SCNC: 99 MMOL/L (ref 97–108)
CO2 SERPL-SCNC: 28 MMOL/L (ref 21–32)
CREAT SERPL-MCNC: 0.66 MG/DL (ref 0.55–1.02)
ERYTHROCYTE [DISTWIDTH] IN BLOOD BY AUTOMATED COUNT: 13.3 % (ref 11.5–14.5)
GLUCOSE SERPL-MCNC: 75 MG/DL (ref 65–100)
HCT VFR BLD AUTO: 29.3 % (ref 35–47)
HGB BLD-MCNC: 9.1 G/DL (ref 11.5–16)
MCH RBC QN AUTO: 30.1 PG (ref 26–34)
MCHC RBC AUTO-ENTMCNC: 31.1 G/DL (ref 30–36.5)
MCV RBC AUTO: 97 FL (ref 80–99)
NRBC # BLD: 0 K/UL (ref 0–0.01)
NRBC BLD-RTO: 0 PER 100 WBC
PLATELET # BLD AUTO: 382 K/UL (ref 150–400)
PMV BLD AUTO: 9.5 FL (ref 8.9–12.9)
POTASSIUM SERPL-SCNC: 4.1 MMOL/L (ref 3.5–5.1)
RBC # BLD AUTO: 3.02 M/UL (ref 3.8–5.2)
SODIUM SERPL-SCNC: 136 MMOL/L (ref 136–145)
WBC # BLD AUTO: 6.6 K/UL (ref 3.6–11)

## 2020-05-28 PROCEDURE — 85027 COMPLETE CBC AUTOMATED: CPT

## 2020-05-28 PROCEDURE — 36415 COLL VENOUS BLD VENIPUNCTURE: CPT

## 2020-05-28 PROCEDURE — 80048 BASIC METABOLIC PNL TOTAL CA: CPT

## 2020-06-01 LAB
ANION GAP SERPL CALC-SCNC: 5 MMOL/L (ref 5–15)
BUN SERPL-MCNC: 10 MG/DL (ref 6–20)
BUN/CREAT SERPL: 16 (ref 12–20)
CALCIUM SERPL-MCNC: 8.7 MG/DL (ref 8.5–10.1)
CHLORIDE SERPL-SCNC: 103 MMOL/L (ref 97–108)
CO2 SERPL-SCNC: 29 MMOL/L (ref 21–32)
CREAT SERPL-MCNC: 0.64 MG/DL (ref 0.55–1.02)
ERYTHROCYTE [DISTWIDTH] IN BLOOD BY AUTOMATED COUNT: 13.3 % (ref 11.5–14.5)
GLUCOSE SERPL-MCNC: 96 MG/DL (ref 65–100)
HCT VFR BLD AUTO: 29.3 % (ref 35–47)
HGB BLD-MCNC: 9.3 G/DL (ref 11.5–16)
MCH RBC QN AUTO: 29.6 PG (ref 26–34)
MCHC RBC AUTO-ENTMCNC: 31.7 G/DL (ref 30–36.5)
MCV RBC AUTO: 93.3 FL (ref 80–99)
NRBC # BLD: 0 K/UL (ref 0–0.01)
NRBC BLD-RTO: 0 PER 100 WBC
PLATELET # BLD AUTO: 416 K/UL (ref 150–400)
PMV BLD AUTO: 9.6 FL (ref 8.9–12.9)
POTASSIUM SERPL-SCNC: 3.8 MMOL/L (ref 3.5–5.1)
RBC # BLD AUTO: 3.14 M/UL (ref 3.8–5.2)
SODIUM SERPL-SCNC: 137 MMOL/L (ref 136–145)
WBC # BLD AUTO: 4.8 K/UL (ref 3.6–11)

## 2020-06-01 PROCEDURE — 85027 COMPLETE CBC AUTOMATED: CPT

## 2020-06-01 PROCEDURE — 80048 BASIC METABOLIC PNL TOTAL CA: CPT

## 2020-06-01 PROCEDURE — 36415 COLL VENOUS BLD VENIPUNCTURE: CPT

## 2020-06-08 ENCOUNTER — OFFICE VISIT (OUTPATIENT)
Dept: FAMILY MEDICINE CLINIC | Age: 75
End: 2020-06-08

## 2020-06-08 VITALS
BODY MASS INDEX: 31.28 KG/M2 | RESPIRATION RATE: 16 BRPM | SYSTOLIC BLOOD PRESSURE: 110 MMHG | HEIGHT: 62 IN | OXYGEN SATURATION: 98 % | WEIGHT: 170 LBS | TEMPERATURE: 98.9 F | DIASTOLIC BLOOD PRESSURE: 68 MMHG | HEART RATE: 100 BPM

## 2020-06-08 DIAGNOSIS — M51.36 DDD (DEGENERATIVE DISC DISEASE), LUMBAR: Primary | ICD-10-CM

## 2020-06-08 DIAGNOSIS — M41.50 DEGENERATIVE SCOLIOSIS: ICD-10-CM

## 2020-06-08 DIAGNOSIS — I10 HTN, GOAL BELOW 140/90: ICD-10-CM

## 2020-06-08 DIAGNOSIS — Z98.1 S/P LAMINECTOMY WITH SPINAL FUSION: ICD-10-CM

## 2020-06-08 RX ORDER — OMEPRAZOLE 20 MG/1
20 CAPSULE, DELAYED RELEASE ORAL
COMMUNITY
End: 2022-08-02

## 2020-06-08 RX ORDER — OXYCODONE HYDROCHLORIDE 5 MG/1
5-10 TABLET ORAL
COMMUNITY
Start: 2020-05-18 | End: 2020-10-20

## 2020-06-08 RX ORDER — OMEPRAZOLE 20 MG/1
1 TABLET, DELAYED RELEASE ORAL DAILY
COMMUNITY
End: 2020-10-20

## 2020-06-08 RX ORDER — OXYCODONE HYDROCHLORIDE 5 MG/1
TABLET ORAL
COMMUNITY
Start: 2020-06-01 | End: 2020-10-20

## 2020-06-08 RX ORDER — SIMVASTATIN 20 MG/1
20 TABLET, FILM COATED ORAL AT BEDTIME
COMMUNITY
End: 2020-06-22

## 2020-06-08 RX ORDER — GABAPENTIN 300 MG/1
CAPSULE ORAL
COMMUNITY
Start: 2020-06-01 | End: 2020-06-08 | Stop reason: ALTCHOICE

## 2020-06-08 RX ORDER — METFORMIN HYDROCHLORIDE 500 MG/1
TABLET ORAL
COMMUNITY
Start: 2020-06-01 | End: 2020-10-20

## 2020-06-08 RX ORDER — OLMESARTAN MEDOXOMIL 40 MG/1
40 TABLET ORAL
COMMUNITY
Start: 2019-06-18 | End: 2020-06-08 | Stop reason: ALTCHOICE

## 2020-06-08 NOTE — PROGRESS NOTES
HISTORY OF PRESENT ILLNESS  Andrea Nichols is a 69 y.o. female. HPI  NANCY from [facility]. No records available at this time. Had surgery on 5/13/20. Spinal fusion for lumbar DDD and degenerative scoliosis. Had T9-L5 fused by Dr. Franco Mendez at [facility].  Spent 2 weeks in rehab. Now at home with home health and PT. Overall progressing well. Wearing back brace and using walker for ambulation. Has had decreased dorsiflexion of left foot since surgery presumed to be due to nerve compression during procedure. Has upcoming EEG scheduled. Pain is managed with oxycodone and muscle relaxer. Has follow up with surgeon this week. History of HTN. Benicar was held during hospitalization and rehab due to BP being low. Normotensive today in office off med. Past Medical History:   Diagnosis Date    Breast cancer ([facility] [address] 2003    lumpectomy + radiation right breast    Diabetes mellitus, controlled ([address] 9/3/2015    Encounter for screening mammogram for breast cancer 11/20/2018    Dr. Malena Bishop - normal - repeat in 1 year     Genital herpes 3/10/2010    GERD (gastroesophageal reflux disease)     HTN, goal below 140/90 3/8/2010    Hyperlipemia 11/26/2014    Osteopenia 03/10/2010    Reclast    Screening mammogram, encounter for 09/17/2018    Dr. Malena Bishop - possible mass on right breast     Screening mammogram, encounter for 12/24/2019    normal - repeat in 1 year     Patient Active Problem List   Diagnosis Code    HTN, goal below 140/90 I10    Osteopenia M85.80    Genital herpes A60.00    Hyperlipemia E78.5    Diabetes mellitus, controlled ([address] E11.9    GERD (gastroesophageal reflux disease) K21.9    Malignant neoplasm of right female breast ([address] C50.911    Obesity, Class I, BMI 30-34.9 E66.9    Severe obesity (HCC) E66.01    Osteoporosis M81.0     Current Outpatient Medications   Medication Sig    omeprazole (PRILOSEC) 20 mg capsule 20 mg.    simvastatin (ZOCOR) 20 mg tablet Take 20 mg by mouth At bedtime.     omeprazole (PriLOSEC OTC) 20 mg tablet Take 1 Tab by mouth daily.  oxyCODONE IR (ROXICODONE) 5 mg immediate release tablet     oxyCODONE IR (ROXICODONE) 5 mg immediate release tablet 5-10 mg.    metFORMIN (GLUCOPHAGE) 500 mg tablet     metFORMIN (GLUCOPHAGE) 500 mg tablet TAKE 1 TABLET BY MOUTH TWO  TIMES DAILY WITH MEALS    gabapentin (NEURONTIN) 300 mg capsule Take 300 mg by mouth. Takes 2 tabs at night.  cyclobenzaprine (FLEXERIL) 5 mg tablet Take 1 Tab by mouth nightly.  atorvastatin (LIPITOR) 40 mg tablet TAKE 1 TABLET BY MOUTH  DAILY (Patient taking differently: Take 40 mg by mouth daily.)    budesonide-formoterol (SYMBICORT) 160-4.5 mcg/actuation HFAA Take 2 Puffs by inhalation two (2) times a day. (Patient taking differently: Take 2 Puffs by inhalation as needed.)    albuterol (PROVENTIL HFA, VENTOLIN HFA, PROAIR HFA) 90 mcg/actuation inhaler Take 2 Puffs by inhalation every six (6) hours as needed for Wheezing.  OMEPRAZOLE MAGNESIUM (PRILOSEC OTC PO) Take 1 Tab by mouth daily.  valACYclovir (VALTREX) 500 mg tablet TAKE 1 TABLET BY MOUTH  DAILY    SPIRIVA RESPIMAT 2.5 mcg/actuation inhaler Take  by inhalation as needed.  TURMERIC, BULK, by Does Not Apply route. 1500mg, 140mg , 10mg    calcium-cholecalciferol, D3, (CALTRATE 600+D) tablet Take 1 Tab by mouth daily. No current facility-administered medications for this visit. Social History     Tobacco Use    Smoking status: Never Smoker    Smokeless tobacco: Never Used   Substance Use Topics    Alcohol use: Yes     Comment: red wine 7/wk    Drug use: No     Blood pressure 110/68, pulse 100, temperature 98.9 °F (37.2 °C), temperature source Oral, resp. rate 16, height 5' 2.25\" (1.581 m), weight 170 lb (77.1 kg), SpO2 98 %. Review of Systems   Constitutional: Negative for chills, fever and malaise/fatigue. Respiratory: Negative for cough and shortness of breath. Cardiovascular: Negative for chest pain and palpitations. Musculoskeletal: Positive for back pain. Neurological: Positive for focal weakness (left dorsiflexion). Negative for tingling and sensory change. All other systems reviewed and are negative. Physical Exam  Constitutional:       General: She is not in acute distress. Cardiovascular:      Rate and Rhythm: Normal rate and regular rhythm. Pulmonary:      Effort: Pulmonary effort is normal.      Breath sounds: Normal breath sounds. Musculoskeletal:      Right lower leg: No edema. Left lower leg: No edema. Skin:     General: Skin is warm and dry. Neurological:      Mental Status: She is alert. ASSESSMENT and PLAN  Diagnoses and all orders for this visit:    1. DDD (degenerative disc disease), lumbar    2. Degenerative scoliosis    3. S/P laminectomy with spinal fusion  Doing well post op. Continue home PT. Follow up as scheduled with surgeon. 4. HTN, goal below 140/90  Continue to hold benicar. Recommend interim BP monitoring and record. Report if > 130/80 consistently. Follow up 4 months or sooner prn. We discussed the expected course, resolution and complications of the diagnosis in detail. Medication risks, benefits, costs, interactions and side effects were discussed. The patient was advised to contact the office for worsening of condition or FTI as anticipated. The patient expressed understanding of the diagnosis and plan.

## 2020-06-22 RX ORDER — ATORVASTATIN CALCIUM 40 MG/1
TABLET, FILM COATED ORAL
Qty: 90 TAB | Refills: 3 | Status: SHIPPED | OUTPATIENT
Start: 2020-06-22 | End: 2021-04-26

## 2020-10-20 ENCOUNTER — VIRTUAL VISIT (OUTPATIENT)
Dept: FAMILY MEDICINE CLINIC | Age: 75
End: 2020-10-20
Payer: MEDICARE

## 2020-10-20 DIAGNOSIS — E78.5 HYPERLIPIDEMIA, UNSPECIFIED HYPERLIPIDEMIA TYPE: ICD-10-CM

## 2020-10-20 DIAGNOSIS — E11.9 CONTROLLED TYPE 2 DIABETES MELLITUS WITHOUT COMPLICATION, WITHOUT LONG-TERM CURRENT USE OF INSULIN (HCC): ICD-10-CM

## 2020-10-20 DIAGNOSIS — M21.372 FOOT DROP, LEFT: Primary | ICD-10-CM

## 2020-10-20 DIAGNOSIS — I10 HTN, GOAL BELOW 140/90: ICD-10-CM

## 2020-10-20 DIAGNOSIS — M81.0 OSTEOPOROSIS, UNSPECIFIED OSTEOPOROSIS TYPE, UNSPECIFIED PATHOLOGICAL FRACTURE PRESENCE: ICD-10-CM

## 2020-10-20 PROCEDURE — 3017F COLORECTAL CA SCREEN DOC REV: CPT | Performed by: FAMILY MEDICINE

## 2020-10-20 PROCEDURE — G0463 HOSPITAL OUTPT CLINIC VISIT: HCPCS | Performed by: FAMILY MEDICINE

## 2020-10-20 PROCEDURE — G8432 DEP SCR NOT DOC, RNG: HCPCS | Performed by: FAMILY MEDICINE

## 2020-10-20 PROCEDURE — G9899 SCRN MAM PERF RSLTS DOC: HCPCS | Performed by: FAMILY MEDICINE

## 2020-10-20 PROCEDURE — 1090F PRES/ABSN URINE INCON ASSESS: CPT | Performed by: FAMILY MEDICINE

## 2020-10-20 PROCEDURE — 99214 OFFICE O/P EST MOD 30 MIN: CPT | Performed by: FAMILY MEDICINE

## 2020-10-20 PROCEDURE — 1101F PT FALLS ASSESS-DOCD LE1/YR: CPT | Performed by: FAMILY MEDICINE

## 2020-10-20 PROCEDURE — G8756 NO BP MEASURE DOC: HCPCS | Performed by: FAMILY MEDICINE

## 2020-10-20 PROCEDURE — 2022F DILAT RTA XM EVC RTNOPTHY: CPT | Performed by: FAMILY MEDICINE

## 2020-10-20 PROCEDURE — G8428 CUR MEDS NOT DOCUMENT: HCPCS | Performed by: FAMILY MEDICINE

## 2020-10-20 PROCEDURE — 3044F HG A1C LEVEL LT 7.0%: CPT | Performed by: FAMILY MEDICINE

## 2020-10-20 NOTE — PROGRESS NOTES
Michaela Reynolds, who was evaluated through a synchronous (real-time) audio-video encounter, and/or her healthcare decision maker, is aware that it is a billable service, with coverage as determined by her insurance carrier. She provided verbal consent to proceed: YES, and patient identification was verified. It was conducted pursuant to the emergency declaration under the [facility], [address] [facility] authority and the [employer] Act. A caregiver was present when appropriate. Ability to conduct physical exam was limited. I was at home. The patient was at home. This virtual visit was conducted via [employer]. Pursuant to the emergency declaration under the [facility], [address] [place] [address] authority and the [employer] and [employer] Act, this Virtual  Visit was conducted to reduce the patient's risk of exposure to COVID-19 and provide continuity of care for an established patient. Services were provided through a video synchronous discussion virtually to substitute for in-person clinic visit. Due to this being a TeleHealth evaluation, many elements of the physical examination are unable to be assessed. Total Time: minutes: 11-20 minutes. Yenny Lee MD    712  Subjective:   Michaela Reynolds was seen for Form Completion    Pt requesting DMV handicap placard due to left foot drop. She is s/p spinal fusion for lumbar DDD and degenerative scoliosis with Dr. Brittany Joel. She has residual left foot drop of which she is getting PT for and had a recent EMG done. Cannot walk more than 200 ft without stopping to rest and uses forearm crutches to walk. Her current placard expires Nov 10th; she would like to have it renewed. Prior to Admission medications    Medication Sig Start Date End Date Taking?  Authorizing Provider   atorvastatin (LIPITOR) 40 mg tablet TAKE 1 TABLET BY MOUTH DAILY 6/22/20   Fermin Doran NP   omeprazole (PRILOSEC) 20 mg capsule 20 mg.    Provider, Historical   omeprazole (PriLOSEC OTC) 20 mg tablet Take 1 Tab by mouth daily. Provider, Historical   oxyCODONE IR (ROXICODONE) 5 mg immediate release tablet  6/1/20   Provider, Historical   oxyCODONE IR (ROXICODONE) 5 mg immediate release tablet 5-10 mg. 5/18/20   Provider, Historical   metFORMIN (GLUCOPHAGE) 500 mg tablet  6/1/20   Provider, Historical   metFORMIN (GLUCOPHAGE) 500 mg tablet TAKE 1 TABLET BY MOUTH TWO  TIMES DAILY WITH MEALS 3/9/20   Robel Mejia MD   valACYclovir (VALTREX) 500 mg tablet TAKE 1 TABLET BY MOUTH  DAILY 3/9/20   Robel Mejia MD   gabapentin (NEURONTIN) 300 mg capsule Take 300 mg by mouth. Takes 2 tabs at night. 12/4/19   Provider, Historical   cyclobenzaprine (FLEXERIL) 5 mg tablet Take 1 Tab by mouth nightly. 6/4/19   Theron Salinas NP   SPIRIVA RESPIMAT 2.5 mcg/actuation inhaler Take  by inhalation as needed. 6/26/18   Provider, Historical   TURMERIC, BULK, by Does Not Apply route. 1500mg, 140mg , 10mg    Provider, Historical   budesonide-formoterol (SYMBICORT) 160-4.5 mcg/actuation HFAA Take 2 Puffs by inhalation two (2) times a day. Patient taking differently: Take 2 Puffs by inhalation as needed. 3/8/18   Baylee Mitchell NP   albuterol (PROVENTIL HFA, VENTOLIN HFA, PROAIR HFA) 90 mcg/actuation inhaler Take 2 Puffs by inhalation every six (6) hours as needed for Wheezing. 2/27/18   Baylee Mitchell NP   calcium-cholecalciferol, D3, (CALTRATE 600+D) tablet Take 1 Tab by mouth daily. Provider, Historical   OMEPRAZOLE MAGNESIUM (PRILOSEC OTC PO) Take 1 Tab by mouth daily. Provider, Historical       Allergies   Allergen Reactions    Pcn [Penicillins] Itching     Lips itching    Femara [Letrozole] Other (comments)     htn       Review of Systems   Constitutional: Negative for fever, malaise/fatigue and weight loss.    Respiratory: Negative for cough, hemoptysis, shortness of breath and wheezing. Cardiovascular: Negative for chest pain, palpitations, leg swelling and PND. Gastrointestinal: Negative for abdominal pain, constipation, diarrhea, nausea and vomiting. Musculoskeletal: Negative for falls. Physical Exam:     There were no vitals taken for this visit. General: alert, cooperative, no distress   Mental  status: normal mood, behavior, speech, dress, motor activity, and thought processes, able to follow commands   HENT: NCAT   Neck: no visualized mass   Resp: no respiratory distress   Neuro: no gross deficits   Skin: no discoloration or lesions of concern on visible areas   Psychiatric: normal affect, consistent with stated mood, no evidence of hallucinations       Assessment & Plan:     Jose Enrique Steele is a 69 y.o. female who presents today for:    1. Foot drop, left  Will fill out DMV forms. Pt to continue with PT.    2. HTN, goal below 140/90  Stable, continue current treatment. 3. Controlled type 2 diabetes mellitus without complication, without long-term current use of insulin (HCC)  Stable, continue current treatment pending review of labs. - HEMOGLOBIN A1C WITH EAG  - MICROALBUMIN, UR, RAND W/ MICROALB/CREAT RATIO    4. Hyperlipidemia, unspecified hyperlipidemia type  - CBC W/O DIFF  - METABOLIC PANEL, COMPREHENSIVE  - LIPID PANEL    5.  Osteoporosis, unspecified osteoporosis type, unspecified pathological fracture presence  - VITAMIN D, 25 HYDROXY       Medications Discontinued During This Encounter   Medication Reason    metFORMIN (GLUCOPHAGE) 500 mg tablet Not A Current Medication    omeprazole (PriLOSEC OTC) 20 mg tablet Not A Current Medication    oxyCODONE IR (ROXICODONE) 5 mg immediate release tablet Not A Current Medication    oxyCODONE IR (ROXICODONE) 5 mg immediate release tablet Not A Current Medication    cyclobenzaprine (FLEXERIL) 5 mg tablet Not A Current Medication    OMEPRAZOLE MAGNESIUM (PRILOSEC OTC PO) Not A Current Medication          Follow-up and Dispositions    · Return in about 6 months (around 4/20/2021) for DM follow up. Treatment risks/benefits/costs/interactions/alternatives discussed with patient. Advised patient to call back or return to office if symptoms worsen/change/persist. If patient cannot reach us or should anything more severe/urgent arise he/she should proceed directly to the nearest emergency department. Discussed expected course/resolution/complications of diagnosis in detail with patient. Patient expressed understanding with the diagnosis and plan. Kristel Echeverria M.D.

## 2020-11-17 ENCOUNTER — HOSPITAL ENCOUNTER (OUTPATIENT)
Dept: LAB | Age: 75
Discharge: HOME | End: 2020-11-17
Payer: MEDICARE

## 2020-11-17 PROCEDURE — 36415 COLL VENOUS BLD VENIPUNCTURE: CPT

## 2020-11-17 PROCEDURE — 82306 VITAMIN D 25 HYDROXY: CPT

## 2020-11-17 PROCEDURE — 80061 LIPID PANEL: CPT

## 2020-11-17 PROCEDURE — 85027 COMPLETE CBC AUTOMATED: CPT

## 2020-11-17 PROCEDURE — 82043 UR ALBUMIN QUANTITATIVE: CPT

## 2020-11-17 PROCEDURE — 83036 HEMOGLOBIN GLYCOSYLATED A1C: CPT

## 2020-11-17 PROCEDURE — 80053 COMPREHEN METABOLIC PANEL: CPT

## 2020-11-18 LAB
25(OH)D3+25(OH)D2 SERPL-MCNC: 46.3 NG/ML (ref 30–100)
ALBUMIN SERPL-MCNC: 4.2 G/DL (ref 3.7–4.7)
ALBUMIN/CREAT UR: 13 MG/G CREAT (ref 0–29)
ALBUMIN/GLOB SERPL: 1.8 {RATIO} (ref 1.2–2.2)
ALP SERPL-CCNC: 110 IU/L (ref 39–117)
ALT SERPL-CCNC: 6 IU/L (ref 0–32)
AST SERPL-CCNC: 16 IU/L (ref 0–40)
BILIRUB SERPL-MCNC: 0.4 MG/DL (ref 0–1.2)
BUN SERPL-MCNC: 16 MG/DL (ref 8–27)
BUN/CREAT SERPL: 22 (ref 12–28)
CALCIUM SERPL-MCNC: 9.5 MG/DL (ref 8.7–10.3)
CHLORIDE SERPL-SCNC: 100 MMOL/L (ref 96–106)
CHOLEST SERPL-MCNC: 156 MG/DL (ref 100–199)
CO2 SERPL-SCNC: 23 MMOL/L (ref 20–29)
CREAT SERPL-MCNC: 0.73 MG/DL (ref 0.57–1)
CREAT UR-MCNC: 52 MG/DL
ERYTHROCYTE [DISTWIDTH] IN BLOOD BY AUTOMATED COUNT: 17.2 % (ref 11.7–15.4)
EST. AVERAGE GLUCOSE BLD GHB EST-MCNC: 128 MG/DL
GLOBULIN SER CALC-MCNC: 2.3 G/DL (ref 1.5–4.5)
GLUCOSE SERPL-MCNC: 109 MG/DL (ref 65–99)
HBA1C MFR BLD: 6.1 % (ref 4.8–5.6)
HCT VFR BLD AUTO: 38.6 % (ref 34–46.6)
HDLC SERPL-MCNC: 71 MG/DL
HGB BLD-MCNC: 12.5 G/DL (ref 11.1–15.9)
INTERPRETATION, 910389: NORMAL
LDLC SERPL CALC-MCNC: 73 MG/DL (ref 0–99)
MCH RBC QN AUTO: 27.2 PG (ref 26.6–33)
MCHC RBC AUTO-ENTMCNC: 32.4 G/DL (ref 31.5–35.7)
MCV RBC AUTO: 84 FL (ref 79–97)
MICROALBUMIN UR-MCNC: 6.5 UG/ML
PLATELET # BLD AUTO: 279 X10E3/UL (ref 150–450)
POTASSIUM SERPL-SCNC: 4.8 MMOL/L (ref 3.5–5.2)
PROT SERPL-MCNC: 6.5 G/DL (ref 6–8.5)
RBC # BLD AUTO: 4.6 X10E6/UL (ref 3.77–5.28)
SODIUM SERPL-SCNC: 138 MMOL/L (ref 134–144)
TRIGL SERPL-MCNC: 59 MG/DL (ref 0–149)
VLDLC SERPL CALC-MCNC: 12 MG/DL (ref 5–40)
WBC # BLD AUTO: 6.7 X10E3/UL (ref 3.4–10.8)

## 2020-11-19 NOTE — PROGRESS NOTES
Dear Ms. Devine,    I wanted to follow up on your recent test results: All labs are stable. Please continue your current medications. Let's follow up in 6 months.

## 2020-12-14 RX ORDER — OLMESARTAN MEDOXOMIL 40 MG/1
40 TABLET ORAL DAILY
Qty: 90 TAB | Refills: 1 | Status: SHIPPED | OUTPATIENT
Start: 2020-12-14 | End: 2021-05-21

## 2020-12-14 NOTE — TELEPHONE ENCOUNTER
MD Gonsalez Severe,    Patient call stating her blood pressure medication was denied refill at the pharmacy. It looks like NP [place] discontinued on 6/8/20 with therapy completed. Per visit in June, Benicar was held during hospitalization and rehab due to BP being low. Patient was normotensive in office that day. VV on 10/20/20. HTN stable, continue current treatment. Goal below 140/90. I tried calling patient and left message to call me back pertaining to the blood pressure medication status.   Thanks, Cherylann Snellen

## 2020-12-14 NOTE — TELEPHONE ENCOUNTER
Yes please clarify if pt is still taking Benicar and what her home BP values have been recently. The 10/20 was a virtual visit.

## 2020-12-14 NOTE — TELEPHONE ENCOUNTER
MD Unice Babinski,    Patient called back and stated she has been taking the olmesartan since July. Her BP had gone back up once she was home and back on a routine. Attached previous rx if appropriate. She stated she is now out of medication and needs today. Thanks, Mary    Last Visit: VV 10/20/20  MD Unice Babinski  Next Appointment: Not scheduled  Previous Refill Encounter(s): 1/27/20  90 + 2    Requested Prescriptions     Pending Prescriptions Disp Refills    olmesartan (BENICAR) 40 mg tablet 90 Tab 1     Sig: Take 1 Tab by mouth daily.

## 2021-04-26 RX ORDER — ATORVASTATIN CALCIUM 40 MG/1
TABLET, FILM COATED ORAL
Qty: 90 TAB | Refills: 3 | Status: SHIPPED | OUTPATIENT
Start: 2021-04-26 | End: 2022-03-02

## 2021-05-21 ENCOUNTER — TELEPHONE (OUTPATIENT)
Dept: FAMILY MEDICINE CLINIC | Age: 76
End: 2021-05-21

## 2021-05-21 DIAGNOSIS — M81.0 OSTEOPOROSIS, UNSPECIFIED OSTEOPOROSIS TYPE, UNSPECIFIED PATHOLOGICAL FRACTURE PRESENCE: ICD-10-CM

## 2021-05-21 DIAGNOSIS — E78.5 HYPERLIPIDEMIA, UNSPECIFIED HYPERLIPIDEMIA TYPE: ICD-10-CM

## 2021-05-21 DIAGNOSIS — E11.9 CONTROLLED TYPE 2 DIABETES MELLITUS WITHOUT COMPLICATION, WITHOUT LONG-TERM CURRENT USE OF INSULIN (HCC): Primary | ICD-10-CM

## 2021-05-21 NOTE — TELEPHONE ENCOUNTER
Shruti Lyons (Self) [phone] (H)       Would like to schedule lab appointment for CPE on 6/14. PT feels the afternoon appointment will be tough for fasting. Please let me know and I will schedule. Thank you.

## 2021-06-08 ENCOUNTER — LAB ONLY (OUTPATIENT)
Dept: FAMILY MEDICINE CLINIC | Age: 76
End: 2021-06-08

## 2021-06-08 DIAGNOSIS — M81.0 OSTEOPOROSIS, UNSPECIFIED OSTEOPOROSIS TYPE, UNSPECIFIED PATHOLOGICAL FRACTURE PRESENCE: ICD-10-CM

## 2021-06-08 DIAGNOSIS — E11.9 CONTROLLED TYPE 2 DIABETES MELLITUS WITHOUT COMPLICATION, WITHOUT LONG-TERM CURRENT USE OF INSULIN (HCC): ICD-10-CM

## 2021-06-08 DIAGNOSIS — E78.5 HYPERLIPIDEMIA, UNSPECIFIED HYPERLIPIDEMIA TYPE: ICD-10-CM

## 2021-06-08 LAB
25(OH)D3 SERPL-MCNC: 59.1 NG/ML (ref 30–100)
ALBUMIN SERPL-MCNC: 4 G/DL (ref 3.5–5)
ALBUMIN/GLOB SERPL: 1.3 {RATIO} (ref 1.1–2.2)
ALP SERPL-CCNC: 101 U/L (ref 45–117)
ALT SERPL-CCNC: 14 U/L (ref 12–78)
ANION GAP SERPL CALC-SCNC: 4 MMOL/L (ref 5–15)
AST SERPL-CCNC: 18 U/L (ref 15–37)
BILIRUB SERPL-MCNC: 0.6 MG/DL (ref 0.2–1)
BUN SERPL-MCNC: 24 MG/DL (ref 6–20)
BUN/CREAT SERPL: 34 (ref 12–20)
CALCIUM SERPL-MCNC: 9.6 MG/DL (ref 8.5–10.1)
CHLORIDE SERPL-SCNC: 107 MMOL/L (ref 97–108)
CHOLEST SERPL-MCNC: 168 MG/DL
CO2 SERPL-SCNC: 28 MMOL/L (ref 21–32)
CREAT SERPL-MCNC: 0.71 MG/DL (ref 0.55–1.02)
CREAT UR-MCNC: 73.6 MG/DL
ERYTHROCYTE [DISTWIDTH] IN BLOOD BY AUTOMATED COUNT: 15.1 % (ref 11.5–14.5)
EST. AVERAGE GLUCOSE BLD GHB EST-MCNC: 137 MG/DL
GLOBULIN SER CALC-MCNC: 3 G/DL (ref 2–4)
GLUCOSE SERPL-MCNC: 111 MG/DL (ref 65–100)
HBA1C MFR BLD: 6.4 % (ref 4–5.6)
HCT VFR BLD AUTO: 43.9 % (ref 35–47)
HDLC SERPL-MCNC: 77 MG/DL
HDLC SERPL: 2.2 {RATIO} (ref 0–5)
HGB BLD-MCNC: 13.8 G/DL (ref 11.5–16)
LDLC SERPL CALC-MCNC: 73.6 MG/DL (ref 0–100)
MCH RBC QN AUTO: 29.6 PG (ref 26–34)
MCHC RBC AUTO-ENTMCNC: 31.4 G/DL (ref 30–36.5)
MCV RBC AUTO: 94.2 FL (ref 80–99)
MICROALBUMIN UR-MCNC: 0.73 MG/DL
MICROALBUMIN/CREAT UR-RTO: 10 MG/G (ref 0–30)
NRBC # BLD: 0 K/UL (ref 0–0.01)
NRBC BLD-RTO: 0 PER 100 WBC
PLATELET # BLD AUTO: 272 K/UL (ref 150–400)
PMV BLD AUTO: 10.9 FL (ref 8.9–12.9)
POTASSIUM SERPL-SCNC: 4.9 MMOL/L (ref 3.5–5.1)
PROT SERPL-MCNC: 7 G/DL (ref 6.4–8.2)
RBC # BLD AUTO: 4.66 M/UL (ref 3.8–5.2)
SODIUM SERPL-SCNC: 139 MMOL/L (ref 136–145)
TRIGL SERPL-MCNC: 87 MG/DL (ref ?–150)
VLDLC SERPL CALC-MCNC: 17.4 MG/DL
WBC # BLD AUTO: 5.5 K/UL (ref 3.6–11)

## 2021-06-14 ENCOUNTER — OFFICE VISIT (OUTPATIENT)
Dept: FAMILY MEDICINE CLINIC | Age: 76
End: 2021-06-14
Payer: MEDICARE

## 2021-06-14 VITALS
HEART RATE: 87 BPM | OXYGEN SATURATION: 97 % | SYSTOLIC BLOOD PRESSURE: 130 MMHG | TEMPERATURE: 97.3 F | HEIGHT: 62 IN | RESPIRATION RATE: 16 BRPM | DIASTOLIC BLOOD PRESSURE: 82 MMHG | BODY MASS INDEX: 33.86 KG/M2 | WEIGHT: 184 LBS

## 2021-06-14 DIAGNOSIS — Z17.0 MALIGNANT NEOPLASM OF UPPER-OUTER QUADRANT OF RIGHT BREAST IN FEMALE, ESTROGEN RECEPTOR POSITIVE (HCC): ICD-10-CM

## 2021-06-14 DIAGNOSIS — E11.9 CONTROLLED TYPE 2 DIABETES MELLITUS WITHOUT COMPLICATION, WITHOUT LONG-TERM CURRENT USE OF INSULIN (HCC): ICD-10-CM

## 2021-06-14 DIAGNOSIS — Z00.00 ENCOUNTER FOR MEDICARE ANNUAL WELLNESS EXAM: Primary | ICD-10-CM

## 2021-06-14 DIAGNOSIS — C50.411 MALIGNANT NEOPLASM OF UPPER-OUTER QUADRANT OF RIGHT BREAST IN FEMALE, ESTROGEN RECEPTOR POSITIVE (HCC): ICD-10-CM

## 2021-06-14 DIAGNOSIS — I10 HTN, GOAL BELOW 140/90: ICD-10-CM

## 2021-06-14 PROBLEM — E66.01 SEVERE OBESITY (HCC): Status: RESOLVED | Noted: 2020-01-10 | Resolved: 2021-06-14

## 2021-06-14 PROCEDURE — G0439 PPPS, SUBSEQ VISIT: HCPCS | Performed by: FAMILY MEDICINE

## 2021-06-14 PROCEDURE — 1101F PT FALLS ASSESS-DOCD LE1/YR: CPT | Performed by: FAMILY MEDICINE

## 2021-06-14 PROCEDURE — G8510 SCR DEP NEG, NO PLAN REQD: HCPCS | Performed by: FAMILY MEDICINE

## 2021-06-14 PROCEDURE — 3044F HG A1C LEVEL LT 7.0%: CPT | Performed by: FAMILY MEDICINE

## 2021-06-14 PROCEDURE — 2022F DILAT RTA XM EVC RTNOPTHY: CPT | Performed by: FAMILY MEDICINE

## 2021-06-14 PROCEDURE — G8427 DOCREV CUR MEDS BY ELIG CLIN: HCPCS | Performed by: FAMILY MEDICINE

## 2021-06-14 PROCEDURE — G8754 DIAS BP LESS 90: HCPCS | Performed by: FAMILY MEDICINE

## 2021-06-14 PROCEDURE — 3017F COLORECTAL CA SCREEN DOC REV: CPT | Performed by: FAMILY MEDICINE

## 2021-06-14 PROCEDURE — G8417 CALC BMI ABV UP PARAM F/U: HCPCS | Performed by: FAMILY MEDICINE

## 2021-06-14 PROCEDURE — G9899 SCRN MAM PERF RSLTS DOC: HCPCS | Performed by: FAMILY MEDICINE

## 2021-06-14 PROCEDURE — G8752 SYS BP LESS 140: HCPCS | Performed by: FAMILY MEDICINE

## 2021-06-14 PROCEDURE — G8536 NO DOC ELDER MAL SCRN: HCPCS | Performed by: FAMILY MEDICINE

## 2021-06-14 RX ORDER — CLINDAMYCIN HYDROCHLORIDE 150 MG/1
CAPSULE ORAL
COMMUNITY
Start: 2021-03-30 | End: 2021-06-14

## 2021-06-14 NOTE — PROGRESS NOTES
This is the Subsequent Medicare Annual Wellness Exam, performed 12 months or more after the Initial AWV or the last Subsequent AWV    I have reviewed the patient's medical history in detail and updated the computerized patient record. Assessment/Plan   Education and counseling provided:  Are appropriate based on today's review and evaluation    1. Encounter for Medicare annual wellness exam  2. Controlled type 2 diabetes mellitus without complication, without long-term current use of insulin ([facility]  Assessment & Plan:   well controlled, continue current medications  3. Malignant neoplasm of upper-outer quadrant of right breast in female, estrogen receptor positive ([facility] [address]  Assessment & Plan:   monitored by specialist. No acute findings meriting change in the plan  4. HTN, goal below 140/90       Depression Risk Factor Screening     3 most recent PHQ Screens 6/14/2021   Little interest or pleasure in doing things Not at all   Feeling down, depressed, irritable, or hopeless Not at all   Total Score PHQ 2 0       Alcohol Risk Screen    Do you average more than 1 drink per night or more than 7 drinks a week:  No    On any one occasion in the past three months have you have had more than 3 drinks containing alcohol:  No        Functional Ability and Level of Safety    Hearing: Hearing is good. Activities of Daily Living: The home contains: no safety equipment.   ADL Assessment 6/14/2021   Feeding yourself No Help Needed   Getting from bed to chair No Help Needed   Getting dressed No Help Needed   Bathing or showering No Help Needed   Walk across the room (includes cane/walker) No Help Needed   Using the telphone No Help Needed   Taking your medications No Help Needed   Preparing meals No Help Needed   Managing money (expenses/bills) No Help Needed   Moderately strenuous housework (laundry) No Help Needed   Shopping for personal items (toiletries/medicines) No Help Needed   Shopping for groceries No Help Needed Driving No Help Needed   Climbing a flight of stairs No Help Needed   Getting to places beyond walking distances No Help Needed         Ambulation: with difficulty, uses a cane     Fall Risk:  Fall Risk Assessment, last 12 mths 6/14/2021   Able to walk? Yes   Fall in past 12 months? 1   Do you feel unsteady? 0   Are you worried about falling 0   Is TUG test greater than 12 seconds? 0   Is the gait abnormal? 0   Number of falls in past 12 months 2   Fall with injury?  0      Abuse Screen:  Patient is not abused       Cognitive Screening    Has your family/caregiver stated any concerns about your memory: no       Health Maintenance Due     Health Maintenance Due   Topic Date Due    Foot Exam Q1  08/31/2019    Eye Exam Retinal or Dilated  03/07/2020       Patient Care Team   Patient Care Team:  Ines Patel MD as PCP - General (Family Medicine)  Ines Patel MD as PCP - [facility] Empaneled Provider  Stephane Torre MD (Nephrology)  Saray Hernandez MD (Orthopedic Surgery)    History     Patient Active Problem List   Diagnosis Code    HTN, goal below 140/90 I10    Osteopenia M85.80    Genital herpes A60.00    Hyperlipemia E78.5    Diabetes mellitus, controlled ([address] E11.9    GERD (gastroesophageal reflux disease) K21.9    Malignant neoplasm of right female breast ([address] C50.911    Obesity, Class I, BMI 30-34.9 E66.9    Osteoporosis M81.0     Past Medical History:   Diagnosis Date    Breast cancer ([address] 2003    lumpectomy + radiation right breast    Diabetes mellitus, controlled ([address] 9/3/2015    Encounter for screening mammogram for breast cancer 11/20/2018    Dr. Rosalia Hui - normal - repeat in 1 year     Genital herpes 3/10/2010    GERD (gastroesophageal reflux disease)     HTN, goal below 140/90 3/8/2010    Hyperlipemia 11/26/2014    Osteopenia 03/10/2010    Reclast    Screening mammogram, encounter for 09/17/2018    Dr. Rosalia Hui - possible mass on right breast     Screening mammogram, encounter for 12/24/2019    normal - repeat in 1 year      Past Surgical History:   Procedure Laterality Date    ENDOSCOPY, COLON, DIAGNOSTIC  2000    HX APPENDECTOMY      HX BREAST LUMPECTOMY      R breast - lumpectomy + radiation - on arimidex x 5 years - Dr. Sharron Shepherd HX LITHOTRIPSY  2006    kidney stone    HX ORTHOPAEDIC  [id number]    bilat hips replaced - needs keflex prior to dental procedure    HX ORTHOPAEDIC      bilateral foot fusion of great hallux. hammer toe    HX TONSILLECTOMY       Current Outpatient Medications   Medication Sig Dispense Refill    vitamin B complex (B COMPLEX PO) Take  by mouth.  olmesartan (BENICAR) 40 mg tablet TAKE ONE TABLET BY MOUTH DAILY 90 Tablet 0    atorvastatin (LIPITOR) 40 mg tablet TAKE 1 TABLET BY MOUTH  DAILY 90 Tab 3    metFORMIN (GLUCOPHAGE) 500 mg tablet TAKE 1 TABLET BY MOUTH TWO  TIMES DAILY WITH MEALS 180 Tab 1    omeprazole (PRILOSEC) 20 mg capsule 20 mg.      gabapentin (NEURONTIN) 300 mg capsule Take 300 mg by mouth. Takes 2 tabs at night.  SPIRIVA RESPIMAT 2.5 mcg/actuation inhaler Take  by inhalation as needed.  budesonide-formoterol (SYMBICORT) 160-4.5 mcg/actuation HFAA Take 2 Puffs by inhalation two (2) times a day. (Patient taking differently: Take 2 Puffs by inhalation as needed.) 1 Inhaler 5    albuterol (PROVENTIL HFA, VENTOLIN HFA, PROAIR HFA) 90 mcg/actuation inhaler Take 2 Puffs by inhalation every six (6) hours as needed for Wheezing. 1 Inhaler 2    calcium-cholecalciferol, D3, (CALTRATE 600+D) tablet Take 1 Tab by mouth daily.       valACYclovir (VALTREX) 500 mg tablet TAKE 1 TABLET BY MOUTH  DAILY (Patient not taking: Reported on 6/14/2021) 90 Tab 2     Allergies   Allergen Reactions    Pcn [Penicillins] Itching     Lips itching    Femara [Letrozole] Other (comments)     htn       Family History   Problem Relation Age of Onset    Arthritis-osteo Mother     Stroke Father     Arthritis-osteo Maternal Grandmother Social History     Tobacco Use    Smoking status: Never Smoker    Smokeless tobacco: Never Used   Substance Use Topics    Alcohol use: Yes     Comment: red wine 7/wk         Larisa Anderson MD

## 2021-06-14 NOTE — PROGRESS NOTES
Patient Name: Kings Wasserman   MRN: [id number]    [address] [place] [address] Bllinda is a 70 y.o. female who presents with the following:     A1c is well controlled. Continues to go to PT for left foot drop which is gradually improving. Uses a cane to walk. Lab Results   Component Value Date/Time    Hemoglobin A1c 6.4 (H) 06/08/2021 09:09 AM    Hemoglobin A1c (POC) 5.6 04/07/2016 08:42 AM    Hemoglobin A1c, External 6.6 05/04/2020 12:00 AM     BP Readings from Last 3 Encounters:   06/14/21 130/82   06/08/20 110/68   01/10/20 120/78     Wt Readings from Last 3 Encounters:   06/14/21 184 lb (83.5 kg)   06/08/20 170 lb (77.1 kg)   01/10/20 179 lb (81.2 kg)     Review of Systems   Constitutional: Negative for fever, malaise/fatigue and weight loss. Respiratory: Negative for cough, hemoptysis, shortness of breath and wheezing. Cardiovascular: Negative for chest pain, palpitations, leg swelling and PND. Gastrointestinal: Negative for abdominal pain, constipation, diarrhea, nausea and vomiting. The patient's medications, allergies, past medical history, surgical history, family history and social history were reviewed and updated where appropriate. Prior to Admission medications    Medication Sig Start Date End Date Taking? Authorizing Provider   vitamin B complex (B COMPLEX PO) Take  by mouth. Yes Provider, Historical   olmesartan (BENICAR) 40 mg tablet TAKE ONE TABLET BY MOUTH DAILY 5/21/21  Yes Melisa Thomas MD   atorvastatin (LIPITOR) 40 mg tablet TAKE 1 TABLET BY MOUTH  DAILY 4/26/21  Yes Melisa Thomas MD   metFORMIN (GLUCOPHAGE) 500 mg tablet TAKE 1 TABLET BY MOUTH TWO  TIMES DAILY WITH MEALS 2/2/21  Yes Melisa Thomas MD   omeprazole (PRILOSEC) 20 mg capsule 20 mg. Yes Provider, Historical   gabapentin (NEURONTIN) 300 mg capsule Take 300 mg by mouth. Takes 2 tabs at night. 12/4/19  Yes Provider, Historical   SPIRIVA RESPIMAT 2.5 mcg/actuation inhaler Take  by inhalation as needed.  6/26/18 Yes Provider, Historical   budesonide-formoterol (SYMBICORT) 160-4.5 mcg/actuation HFAA Take 2 Puffs by inhalation two (2) times a day. Patient taking differently: Take 2 Puffs by inhalation as needed. 3/8/18  Yes Peggy Jackson NP   albuterol (PROVENTIL HFA, VENTOLIN HFA, PROAIR HFA) 90 mcg/actuation inhaler Take 2 Puffs by inhalation every six (6) hours as needed for Wheezing. 2/27/18  Yes Peggy Jackson NP   calcium-cholecalciferol, D3, (CALTRATE 600+D) tablet Take 1 Tab by mouth daily. Yes Provider, Historical   clindamycin (CLEOCIN) 150 mg capsule  3/30/21 6/14/21  Provider, Historical   valACYclovir (VALTREX) 500 mg tablet TAKE 1 TABLET BY MOUTH  DAILY  Patient not taking: Reported on 6/14/2021 3/9/20   MD SANGEETA GouldMERGUTIERREZ, BULK, by Does Not Apply route. 1500mg, 140mg , 10mg  Patient not taking: Reported on 6/14/2021 6/14/21  Provider, Historical       Allergies   Allergen Reactions    Pcn [Penicillins] Itching     Lips itching    Femara [Letrozole] Other (comments)     htn         OBJECTIVE    Visit Vitals  /82 (BP 1 Location: Left arm, BP Patient Position: Sitting, BP Cuff Size: Adult)   Pulse 87   Temp 97.3 °F (36.3 °C) (Temporal)   Resp 16   Ht 5' 2.25\" (1.581 m)   Wt 184 lb (83.5 kg)   SpO2 97%   BMI 33.38 kg/m²       Physical Exam  Vitals and nursing note reviewed. Constitutional:       General: She is not in acute distress. Appearance: She is not diaphoretic. Eyes:      Conjunctiva/sclera: Conjunctivae normal.      Pupils: Pupils are equal, round, and reactive to light. Cardiovascular:      Rate and Rhythm: Normal rate and regular rhythm. Heart sounds: Normal heart sounds. No murmur heard. No friction rub. No gallop. Pulmonary:      Effort: Pulmonary effort is normal. No respiratory distress. Breath sounds: Normal breath sounds. No wheezing. Skin:     General: Skin is warm and dry. Neurological:      Mental Status: She is alert. ASSESSMENT AND PLAN  Harshad Ariza is a 70 y.o. female who presents today for:    1. Encounter for Medicare annual wellness exam    2. Controlled type 2 diabetes mellitus without complication, without long-term current use of insulin (HCC)  Stable, continue current treatment. 3. Malignant neoplasm of upper-outer quadrant of right breast in female, estrogen receptor positive ([facility]    4. HTN, goal below 140/90  Stable, continue current treatment. Medications Discontinued During This Encounter   Medication Reason    clindamycin (CLEOCIN) 150 mg capsule LIST CLEANUP    TURMERIC, BULK, LIST CLEANUP       Follow-up and Dispositions    · Return in about 6 months (around 12/14/2021) for HTN follow up, DM follow up. Treatment risks/benefits/costs/interactions/alternatives discussed with patient. Advised patient to call back or return to office if symptoms worsen/change/persist. If patient cannot reach us or should anything more severe/urgent arise he/she should proceed directly to the nearest emergency department. Discussed expected course/resolution/complications of diagnosis in detail with patient. Patient expressed understanding with the diagnosis and plan. Kristel Morales M.D. Diagnoses and all orders for this visit:    1. Encounter for Medicare annual wellness exam    2. Controlled type 2 diabetes mellitus without complication, without long-term current use of insulin ([facility] [address]  Assessment & Plan:   well controlled, continue current medications      3. Malignant neoplasm of upper-outer quadrant of right breast in female, estrogen receptor positive ([facility] [address]  Assessment & Plan:   monitored by specialist. No acute findings meriting change in the plan      4.  HTN, goal below 140/90

## 2021-07-15 DIAGNOSIS — E11.9 DIABETES TYPE 2, CONTROLLED (HCC): ICD-10-CM

## 2021-07-15 RX ORDER — METFORMIN HYDROCHLORIDE 500 MG/1
TABLET ORAL
Qty: 180 TABLET | Refills: 3 | Status: SHIPPED | OUTPATIENT
Start: 2021-07-15 | End: 2022-05-26

## 2021-11-17 ENCOUNTER — OFFICE VISIT (OUTPATIENT)
Dept: ORTHOPEDIC SURGERY | Age: 76
End: 2021-11-17
Payer: MEDICARE

## 2021-11-17 VITALS — WEIGHT: 180 LBS | BODY MASS INDEX: 30.73 KG/M2 | HEIGHT: 64 IN

## 2021-11-17 DIAGNOSIS — M25.519 SHOULDER PAIN, UNSPECIFIED CHRONICITY, UNSPECIFIED LATERALITY: Primary | ICD-10-CM

## 2021-11-17 DIAGNOSIS — M19.012 OSTEOARTHRITIS OF LEFT SHOULDER, UNSPECIFIED OSTEOARTHRITIS TYPE: ICD-10-CM

## 2021-11-17 DIAGNOSIS — M19.011 OSTEOARTHRITIS OF RIGHT SHOULDER, UNSPECIFIED OSTEOARTHRITIS TYPE: ICD-10-CM

## 2021-11-17 PROCEDURE — G8432 DEP SCR NOT DOC, RNG: HCPCS | Performed by: ORTHOPAEDIC SURGERY

## 2021-11-17 PROCEDURE — G8427 DOCREV CUR MEDS BY ELIG CLIN: HCPCS | Performed by: ORTHOPAEDIC SURGERY

## 2021-11-17 PROCEDURE — 3017F COLORECTAL CA SCREEN DOC REV: CPT | Performed by: ORTHOPAEDIC SURGERY

## 2021-11-17 PROCEDURE — G8536 NO DOC ELDER MAL SCRN: HCPCS | Performed by: ORTHOPAEDIC SURGERY

## 2021-11-17 PROCEDURE — 20610 DRAIN/INJ JOINT/BURSA W/O US: CPT | Performed by: ORTHOPAEDIC SURGERY

## 2021-11-17 PROCEDURE — 1101F PT FALLS ASSESS-DOCD LE1/YR: CPT | Performed by: ORTHOPAEDIC SURGERY

## 2021-11-17 PROCEDURE — G9899 SCRN MAM PERF RSLTS DOC: HCPCS | Performed by: ORTHOPAEDIC SURGERY

## 2021-11-17 PROCEDURE — 1090F PRES/ABSN URINE INCON ASSESS: CPT | Performed by: ORTHOPAEDIC SURGERY

## 2021-11-17 PROCEDURE — 99204 OFFICE O/P NEW MOD 45 MIN: CPT | Performed by: ORTHOPAEDIC SURGERY

## 2021-11-17 PROCEDURE — G8417 CALC BMI ABV UP PARAM F/U: HCPCS | Performed by: ORTHOPAEDIC SURGERY

## 2021-11-17 PROCEDURE — G8756 NO BP MEASURE DOC: HCPCS | Performed by: ORTHOPAEDIC SURGERY

## 2021-11-17 RX ORDER — METHYLPREDNISOLONE ACETATE 40 MG/ML
80 INJECTION, SUSPENSION INTRA-ARTICULAR; INTRALESIONAL; INTRAMUSCULAR; SOFT TISSUE ONCE
Status: COMPLETED | OUTPATIENT
Start: 2021-11-17 | End: 2021-11-17

## 2021-11-17 RX ORDER — BUPIVACAINE HYDROCHLORIDE 2.5 MG/ML
1 INJECTION, SOLUTION INFILTRATION; PERINEURAL ONCE
Status: COMPLETED | OUTPATIENT
Start: 2021-11-17 | End: 2021-11-17

## 2021-11-17 RX ORDER — LIDOCAINE HYDROCHLORIDE 10 MG/ML
1 INJECTION INFILTRATION; PERINEURAL ONCE
Status: COMPLETED | OUTPATIENT
Start: 2021-11-17 | End: 2021-11-17

## 2021-11-17 RX ADMIN — LIDOCAINE HYDROCHLORIDE 1 ML: 10 INJECTION INFILTRATION; PERINEURAL at 21:00

## 2021-11-17 RX ADMIN — BUPIVACAINE HYDROCHLORIDE 2.5 MG: 2.5 INJECTION, SOLUTION INFILTRATION; PERINEURAL at 21:00

## 2021-11-17 RX ADMIN — METHYLPREDNISOLONE ACETATE 80 MG: 40 INJECTION, SUSPENSION INTRA-ARTICULAR; INTRALESIONAL; INTRAMUSCULAR; SOFT TISSUE at 21:00

## 2021-11-17 NOTE — PROGRESS NOTES
SUBJECTIVE/OBJECTIVE:  Andrea Nichols (: 1945) is a 78 y.o. female, patient,here for evaluation of the right shoulder. She reports she has had a long history with right shoulder. She reports she has had pain for many years and she had an MRI number years ago which showed osteoarthritis in the shoulder. More recently, she has been recovering from back surgery. She reports she has been using a walker so she has been doing quite a bit of weightbearing of the right shoulder. She wears her bothers her at night. She reports she lost strength and motion. She reports rest does make it better but activity makes it worse. She denies any numbness or tingling erythema or warmth. She has any neck pain. Physical Exam    Upon physical examination, the patient is well developed, well nourished, alert and oriented times three, with normal mood and affect and walks with a normal gait. Upon examination of the right shoulder, the patient sits with the scapula protracted and depressed. They are tender to palpation over the trapezius and rhomboids as well as the anterior aspect of the supraspinatus and biceps. They are non-tender to palpation over the neck, clavicle, scapula, and elbow. There is no soft tissue swelling and eccymosis. The patient has limited range of motion of the shoulder in all planes secondary to discomfort. The patient is unable to tolerate stability testing. They have 5/5 strength at their side, and are neurovascularly intact distally. There is no erythema, warmth or skin lesions present. On examination of the contralateral extremity, the patient is nontender to palpation and has excellent range of motion, stability and strength. Imaging:    3 views the shoulder show severe glenohumeral arthrosis. ASSESSMENT/PLAN:  Below is the assessment and plan developed based on review of pertinent history, physical exam, labs, studies, and medications.     1. Shoulder pain, unspecified chronicity, unspecified laterality  -     XR SHOULDER RT AP/LAT MIN 2 V; Future  -     MO DRAIN/INJECT LARGE JOINT/BURSA  2. Osteoarthritis of left shoulder, unspecified osteoarthritis type  3. Osteoarthritis of right shoulder, unspecified osteoarthritis type  -     MO DRAIN/INJECT LARGE JOINT/BURSA      Return if symptoms worsen or fail to improve. After a long discussion regarding the patient's diagnosis and treatment options, we have decided to treat this condition conservatively. In the interim, they will continue to ice, elevate and take anti-inflammatories along with their home exercise program.  After discussing the options for treatment and the risks of injection, the patient wished to proceed with the injection to reduce pain and swelling. After a sterile prep, 3cc of lidocaine, 3cc of marcaine and 2cc of depo-medrol were injected into the right shoulder. The patient tolerated the procedure well and there were no complications. Post injection pain, blood sugar elevation, skin discoloration, fatty atrophy and the signs of infection were discussed in detail. The patient was instructed to contact us if there were any questions or concerns prior to their follow up appointment. Referred her to see one of our joint replacement specialist to discuss shoulder arthroplasty. Allergies   Allergen Reactions    Pcn [Penicillins] Itching     Lips itching    Femara [Letrozole] Other (comments)     htn       Current Outpatient Medications   Medication Sig    olmesartan (BENICAR) 40 mg tablet TAKE ONE TABLET BY MOUTH DAILY    metFORMIN (GLUCOPHAGE) 500 mg tablet TAKE 1 TABLET BY MOUTH  TWICE DAILY WITH MEALS    vitamin B complex (B COMPLEX PO) Take  by mouth.  atorvastatin (LIPITOR) 40 mg tablet TAKE 1 TABLET BY MOUTH  DAILY    omeprazole (PRILOSEC) 20 mg capsule 20 mg.    gabapentin (NEURONTIN) 300 mg capsule Take 300 mg by mouth. Takes 2 tabs at night.     SPIRIVA RESPIMAT 2.5 mcg/actuation inhaler Take  by inhalation as needed.  budesonide-formoterol (SYMBICORT) 160-4.5 mcg/actuation HFAA Take 2 Puffs by inhalation two (2) times a day. (Patient taking differently: Take 2 Puffs by inhalation as needed.)    albuterol (PROVENTIL HFA, VENTOLIN HFA, PROAIR HFA) 90 mcg/actuation inhaler Take 2 Puffs by inhalation every six (6) hours as needed for Wheezing.  calcium-cholecalciferol, D3, (CALTRATE 600+D) tablet Take 1 Tab by mouth daily.      Current Facility-Administered Medications   Medication    bupivacaine HCl (MARCAINE) 0.25 % (2.5 mg/mL) injection 2.5 mg    lidocaine (XYLOCAINE) 10 mg/mL (1 %) injection 1 mL    methylPREDNISolone acetate (DEPO-MEDROL) 40 mg/mL injection 80 mg       Past Medical History:   Diagnosis Date    Breast cancer ([facility] [address] 2003    lumpectomy + radiation right breast    Diabetes mellitus, controlled ([facility] [address] 9/3/2015    Encounter for screening mammogram for breast cancer 11/20/2018    Dr. Calixto Awad - normal - repeat in 1 year     Genital herpes 3/10/2010    GERD (gastroesophageal reflux disease)     HTN, goal below 140/90 3/8/2010    Hyperlipemia 11/26/2014    Osteopenia 03/10/2010    Reclast    Screening mammogram, encounter for 09/17/2018    Dr. Calixto Awad - possible mass on right breast     Screening mammogram, encounter for 12/24/2019    normal - repeat in 1 year       Past Surgical History:   Procedure Laterality Date    ENDOSCOPY, COLON, DIAGNOSTIC  2000    HX APPENDECTOMY      HX BREAST LUMPECTOMY      R breast - lumpectomy + radiation - on arimidex x 5 years - Dr. Montse Serrano HX LITHOTRIPSY  2006    kidney stone    HX ORTHOPAEDIC  [id number]    bilat hips replaced - needs keflex prior to dental procedure    HX ORTHOPAEDIC      bilateral foot fusion of great hallux. hammer toe    HX TONSILLECTOMY         Family History   Problem Relation Age of Onset    Arthritis-osteo Mother     Stroke Father     Arthritis-osteo Maternal Grandmother        Social History Socioeconomic History    Marital status:      Spouse name: Not on file    Number of children: Not on file    Years of education: Not on file    Highest education level: Not on file   Occupational History    Not on file   Tobacco Use    Smoking status: Never Smoker    Smokeless tobacco: Never Used   Vaping Use    Vaping Use: Never used   Substance and Sexual Activity    Alcohol use: Yes     Comment: red wine 7/wk    Drug use: No    Sexual activity: Yes     Partners: Male     Birth control/protection: Condom   Other Topics Concern     Service No    Blood Transfusions No    Caffeine Concern No    Occupational Exposure No    Hobby Hazards No    Sleep Concern No    Stress Concern No    Weight Concern Yes     Comment: weight gain     Special Diet No    Back Care No    Exercise Yes     Comment: walks up stairs     Bike Helmet No    Seat Belt Yes    Self-Exams Yes   Social History Narrative    Not on file     Social Determinants of Health     Financial Resource Strain:     Difficulty of Paying Living Expenses: Not on file   Food Insecurity:     Worried About Running Out of Food in the Last Year: Not on file    Howie of Food in the Last Year: Not on file   Transportation Needs:     Lack of Transportation (Medical): Not on file    Lack of Transportation (Non-Medical):  Not on file   Physical Activity:     Days of Exercise per Week: Not on file    Minutes of Exercise per Session: Not on file   Stress:     Feeling of Stress : Not on file   Social Connections:     Frequency of Communication with Friends and Family: Not on file    Frequency of Social Gatherings with Friends and Family: Not on file    Attends Anabaptism Services: Not on file    Active Member of Clubs or Organizations: Not on file    Attends Club or Organization Meetings: Not on file    Marital Status: Not on file   Intimate Partner Violence:     Fear of Current or Ex-Partner: Not on file   [employer] Abused: Not on file    Physically Abused: Not on file    Sexually Abused: Not on file   Housing Stability:     Unable to Pay for Housing in the Last Year: Not on file    Number of Places Lived in the Last Year: Not on file    Unstable Housing in the Last Year: Not on file       Review of Systems    No flowsheet data found. Vitals:  Ht 5' 4\" (1.626 m)   Wt 180 lb (81.6 kg)   BMI 30.90 kg/m²    Body mass index is 30.9 kg/m². An electronic signature was used to authenticate this note.   -- Jose Juan Tena MD

## 2021-12-03 ENCOUNTER — TELEPHONE (OUTPATIENT)
Dept: FAMILY MEDICINE CLINIC | Age: 76
End: 2021-12-03

## 2021-12-03 DIAGNOSIS — E11.9 CONTROLLED TYPE 2 DIABETES MELLITUS WITHOUT COMPLICATION, WITHOUT LONG-TERM CURRENT USE OF INSULIN (HCC): Primary | ICD-10-CM

## 2021-12-13 ENCOUNTER — OFFICE VISIT (OUTPATIENT)
Dept: FAMILY MEDICINE CLINIC | Age: 76
End: 2021-12-13
Payer: MEDICARE

## 2021-12-13 ENCOUNTER — TELEPHONE (OUTPATIENT)
Dept: FAMILY MEDICINE CLINIC | Age: 76
End: 2021-12-13

## 2021-12-13 VITALS
SYSTOLIC BLOOD PRESSURE: 137 MMHG | WEIGHT: 181.2 LBS | OXYGEN SATURATION: 98 % | HEART RATE: 93 BPM | HEIGHT: 64 IN | DIASTOLIC BLOOD PRESSURE: 81 MMHG | TEMPERATURE: 98 F | BODY MASS INDEX: 30.93 KG/M2 | RESPIRATION RATE: 16 BRPM

## 2021-12-13 DIAGNOSIS — E11.9 CONTROLLED TYPE 2 DIABETES MELLITUS WITHOUT COMPLICATION, WITHOUT LONG-TERM CURRENT USE OF INSULIN (HCC): Primary | ICD-10-CM

## 2021-12-13 PROCEDURE — G8432 DEP SCR NOT DOC, RNG: HCPCS | Performed by: NURSE PRACTITIONER

## 2021-12-13 PROCEDURE — G9899 SCRN MAM PERF RSLTS DOC: HCPCS | Performed by: NURSE PRACTITIONER

## 2021-12-13 PROCEDURE — 3044F HG A1C LEVEL LT 7.0%: CPT | Performed by: NURSE PRACTITIONER

## 2021-12-13 PROCEDURE — G8752 SYS BP LESS 140: HCPCS | Performed by: NURSE PRACTITIONER

## 2021-12-13 PROCEDURE — 2022F DILAT RTA XM EVC RTNOPTHY: CPT | Performed by: NURSE PRACTITIONER

## 2021-12-13 PROCEDURE — 1090F PRES/ABSN URINE INCON ASSESS: CPT | Performed by: NURSE PRACTITIONER

## 2021-12-13 PROCEDURE — G8536 NO DOC ELDER MAL SCRN: HCPCS | Performed by: NURSE PRACTITIONER

## 2021-12-13 PROCEDURE — 99213 OFFICE O/P EST LOW 20 MIN: CPT | Performed by: NURSE PRACTITIONER

## 2021-12-13 PROCEDURE — 3017F COLORECTAL CA SCREEN DOC REV: CPT | Performed by: NURSE PRACTITIONER

## 2021-12-13 PROCEDURE — G8417 CALC BMI ABV UP PARAM F/U: HCPCS | Performed by: NURSE PRACTITIONER

## 2021-12-13 PROCEDURE — G8427 DOCREV CUR MEDS BY ELIG CLIN: HCPCS | Performed by: NURSE PRACTITIONER

## 2021-12-13 PROCEDURE — G0463 HOSPITAL OUTPT CLINIC VISIT: HCPCS | Performed by: NURSE PRACTITIONER

## 2021-12-13 PROCEDURE — 1101F PT FALLS ASSESS-DOCD LE1/YR: CPT | Performed by: NURSE PRACTITIONER

## 2021-12-13 PROCEDURE — G8754 DIAS BP LESS 90: HCPCS | Performed by: NURSE PRACTITIONER

## 2021-12-13 NOTE — PROGRESS NOTES
Chief Complaint   Patient presents with    Diabetes       1. Have you been to the ER, urgent care clinic since your last visit? Hospitalized since your last visit? No    2. Have you seen or consulted any other health care providers outside of the [address] [place] [address] [place] since your last visit? Include any pap smears or colon screening. No    3. For patients over 45: Has the patient had a colonoscopy? Yes, HM satisfied with blue hyperlink     If the patient is female:    4. For patients over 40: Has the patient had a mammogram? No    5. For patients over 21: Has the patient had a pap smear? NA based on age or sex    3 most recent PHQ Screens 12/13/2021   Little interest or pleasure in doing things Not at all   Feeling down, depressed, irritable, or hopeless Not at all   Total Score PHQ 2 0     Abuse Screening Questionnaire 12/13/2021   Do you ever feel afraid of your partner? N   Are you in a relationship with someone who physically or mentally threatens you? N   Is it safe for you to go home? Y       Fall Risk Assessment, last 12 mths 12/13/2021   Able to walk? Yes   Fall in past 12 months? 0   Do you feel unsteady? 1   Are you worried about falling 1   Is TUG test greater than 12 seconds?  0   Is the gait abnormal? 1   Number of falls in past 12 months 2   Fall with injury? -       ADL Assessment 12/13/2021   Feeding yourself No Help Needed   Getting from bed to chair No Help Needed   Getting dressed No Help Needed   Bathing or showering No Help Needed   Walk across the room (includes cane/walker) No Help Needed   Using the telphone No Help Needed   Taking your medications No Help Needed   Preparing meals No Help Needed   Managing money (expenses/bills) No Help Needed   Moderately strenuous housework (laundry) No Help Needed   Shopping for personal items (toiletries/medicines) No Help Needed   Shopping for groceries No Help Needed   Driving No Help Needed   Climbing a flight of stairs Help Needed   Getting to places beyond walking distances Help Needed     Health Maintenance Due   Topic Date Due    Foot Exam Q1  08/31/2019    Eye Exam Retinal or Dilated  03/07/2020    COVID-19 Vaccine (3 - Booster for Moderna series) 09/05/2021    Breast Cancer Screen Mammogram  12/29/2021

## 2021-12-13 NOTE — PROGRESS NOTES
Assessment/Plan:     Diagnoses and all orders for this visit:    1. Controlled type 2 diabetes mellitus without complication, without long-term current use of insulin ([facility])  -      DIABETES FOOT EXAM    Stable on current therapy. Continue at this time. Eye exam requested today. Follow-up and Dispositions    · Return in about 6 months (around 6/13/2022) for Medicare Wellness. Discussed expected course/resolution/complications of diagnosis in detail with patient. Medication risks/benefits/costs/interactions/alternatives discussed with patient. Pt was given after visit summary which includes diagnoses, current medications & vitals. Pt expressed understanding with the diagnosis and plan          Subjective:      Pepper Berrios is a 78 y.o. female who presents for had concerns including Diabetes. Patient is a 78 y.o. female that comes today for follow up of Diabetes Mellitus Type 2. Patient does not regularly monitor  their FSBG at home. Max Ramos generally follows a low fat low cholesterol diet  Patients activity level: sedentery  there was no change in patient's weight. .  The patient has not experienced recent hypoglycemia. reports that she has never smoked. She has never used smokeless tobacco.    Lab Results   Component Value Date/Time    Hemoglobin A1c 6.1 (H) 12/06/2021 08:48 AM    Hemoglobin A1c 6.4 (H) 06/08/2021 09:09 AM    Hemoglobin A1c 6.1 (H) 11/17/2020 09:45 AM    Hemoglobin A1c, External 6.6 05/04/2020 12:00 AM     She is followed by [facility] on [facility] and is up to date on routine eye exams.      Patient Active Problem List   Diagnosis Code    HTN, goal below 140/90 I10    Osteopenia M85.80    Genital herpes A60.00    Hyperlipemia E78.5    Diabetes mellitus, controlled ([address] E11.9    GERD (gastroesophageal reflux disease) K21.9    Malignant neoplasm of right female breast ([facility] [address] C50.911    Obesity, Class I, BMI 30-34.9 E66.9    Osteoporosis M81.0 Current Outpatient Medications   Medication Sig Dispense Refill    olmesartan (BENICAR) 40 mg tablet TAKE ONE TABLET BY MOUTH DAILY 90 Tablet 2    metFORMIN (GLUCOPHAGE) 500 mg tablet TAKE 1 TABLET BY MOUTH  TWICE DAILY WITH MEALS 180 Tablet 3    vitamin B complex (B COMPLEX PO) Take  by mouth.  atorvastatin (LIPITOR) 40 mg tablet TAKE 1 TABLET BY MOUTH  DAILY 90 Tab 3    omeprazole (PRILOSEC) 20 mg capsule 20 mg.      gabapentin (NEURONTIN) 300 mg capsule Take 300 mg by mouth. Takes 2 tabs at night.  SPIRIVA RESPIMAT 2.5 mcg/actuation inhaler Take  by inhalation as needed.  budesonide-formoterol (SYMBICORT) 160-4.5 mcg/actuation HFAA Take 2 Puffs by inhalation two (2) times a day. (Patient taking differently: Take 2 Puffs by inhalation as needed.) 1 Inhaler 5    albuterol (PROVENTIL HFA, VENTOLIN HFA, PROAIR HFA) 90 mcg/actuation inhaler Take 2 Puffs by inhalation every six (6) hours as needed for Wheezing. 1 Inhaler 2    calcium-cholecalciferol, D3, (CALTRATE 600+D) tablet Take 1 Tab by mouth daily. Allergies   Allergen Reactions    Pcn [Penicillins] Itching     Lips itching    Femara [Letrozole] Other (comments)     htn       ROS:   Review of Systems   Constitutional: Negative for malaise/fatigue. Eyes: Negative for blurred vision. Respiratory: Negative for shortness of breath. Cardiovascular: Negative for chest pain. Objective:     Visit Vitals  /81 (BP 1 Location: Left upper arm, BP Patient Position: Sitting, BP Cuff Size: Large adult long)   Pulse 93   Temp 98 °F (36.7 °C) (Temporal)   Resp 16   Ht 5' 4\" (1.626 m)   Wt 181 lb 3.2 oz (82.2 kg)   SpO2 98%   BMI 31.10 kg/m²       Vitals and Nurse Documentation reviewed. Physical Exam  Constitutional:       General: She is not in acute distress. Appearance: She is obese. Cardiovascular:      Heart sounds: S1 normal and S2 normal. No murmur heard. No friction rub. No gallop.     Pulmonary: Effort: No respiratory distress. Breath sounds: Normal breath sounds. Musculoskeletal:      Left foot: Foot drop (chronic) present. Feet:      Right foot:      Protective Sensation: 4 sites tested. 4 sites sensed. Left foot:      Protective Sensation: 4 sites tested. 4 sites sensed. Skin:     General: Skin is warm and dry.    Psychiatric:         Mood and Affect: Mood and affect normal.

## 2021-12-14 NOTE — TELEPHONE ENCOUNTER
Called Pt and advised that it was too soon to out in lab orders so Pt has to call back two weeks prior per nurse. Pt agreed and will call back.

## 2022-03-01 NOTE — TELEPHONE ENCOUNTER
Requested Prescriptions     Pending Prescriptions Disp Refills    atorvastatin (LIPITOR) 40 mg tablet [Pharmacy Med Name: Atorvastatin Calcium 40 MG Oral Tablet] 90 Tablet 3     Sig: TAKE 1 TABLET BY MOUTH  DAILY

## 2022-03-02 RX ORDER — ATORVASTATIN CALCIUM 40 MG/1
TABLET, FILM COATED ORAL
Qty: 90 TABLET | Refills: 3 | Status: SHIPPED | OUTPATIENT
Start: 2022-03-02

## 2022-03-18 PROBLEM — E66.9 OBESITY, CLASS I, BMI 30-34.9: Status: ACTIVE | Noted: 2018-02-17

## 2022-03-18 PROBLEM — E66.811 OBESITY, CLASS I, BMI 30-34.9: Status: ACTIVE | Noted: 2018-02-17

## 2022-03-19 PROBLEM — M81.0 OSTEOPOROSIS: Status: ACTIVE | Noted: 2020-01-10

## 2022-03-19 PROBLEM — C50.911 MALIGNANT NEOPLASM OF RIGHT FEMALE BREAST (HCC): Status: ACTIVE | Noted: 2017-06-16

## 2022-04-14 ENCOUNTER — OFFICE VISIT (OUTPATIENT)
Dept: ORTHOPEDIC SURGERY | Age: 77
End: 2022-04-14
Payer: MEDICARE

## 2022-04-14 VITALS — HEIGHT: 63 IN | BODY MASS INDEX: 31.89 KG/M2 | WEIGHT: 180 LBS

## 2022-04-14 DIAGNOSIS — Z98.1 S/P SPINAL FUSION: Primary | ICD-10-CM

## 2022-04-14 PROCEDURE — G8427 DOCREV CUR MEDS BY ELIG CLIN: HCPCS | Performed by: ORTHOPAEDIC SURGERY

## 2022-04-14 PROCEDURE — G8536 NO DOC ELDER MAL SCRN: HCPCS | Performed by: ORTHOPAEDIC SURGERY

## 2022-04-14 PROCEDURE — 3017F COLORECTAL CA SCREEN DOC REV: CPT | Performed by: ORTHOPAEDIC SURGERY

## 2022-04-14 PROCEDURE — G8417 CALC BMI ABV UP PARAM F/U: HCPCS | Performed by: ORTHOPAEDIC SURGERY

## 2022-04-14 PROCEDURE — 1101F PT FALLS ASSESS-DOCD LE1/YR: CPT | Performed by: ORTHOPAEDIC SURGERY

## 2022-04-14 PROCEDURE — G8510 SCR DEP NEG, NO PLAN REQD: HCPCS | Performed by: ORTHOPAEDIC SURGERY

## 2022-04-14 PROCEDURE — G9899 SCRN MAM PERF RSLTS DOC: HCPCS | Performed by: ORTHOPAEDIC SURGERY

## 2022-04-14 PROCEDURE — 1090F PRES/ABSN URINE INCON ASSESS: CPT | Performed by: ORTHOPAEDIC SURGERY

## 2022-04-14 PROCEDURE — 99213 OFFICE O/P EST LOW 20 MIN: CPT | Performed by: ORTHOPAEDIC SURGERY

## 2022-04-14 PROCEDURE — G8756 NO BP MEASURE DOC: HCPCS | Performed by: ORTHOPAEDIC SURGERY

## 2022-04-14 NOTE — PROGRESS NOTES
XR Results (most recent):        Ebb Sample (: 1945) is a 78 y.o. female, patient, here for evaluation of the following chief complaint(s):  LOW BACK PAIN       ASSESSMENT/PLAN:    Below is the assessment and plan developed based on review of pertinent history, physical exam, labs, studies, and medications. At this point she is not had any new complaints or issues. She is here mainly to renew her parking pass. She still has a foot drop on the left-hand side and does wear the AFO but she does have some minor dorsiflexion in her left lower foot. At this time we will just continue to increase her activities. She will see us back as needed. Recommended over-the-counter medications for her minimal symptoms. 1. S/P spinal fusion  -     XR SPINE LUMB MIN 4 V; Future      No follow-ups on file. SUBJECTIVE/OBJECTIVE:  Ebb Sample (: 1945) is a 78 y.o. female. No flowsheet data found. Comes in her 2-year visit for a follow-up. She had a thoracolumbar scoliosis and has [place] no pain from that. She unfortunately had a left-sided foot drop with which she is needed an AFO. We have been renewing her parking pass as needed. She is wondering if we can do a permanent pass today. She not taking any medications from us. Imaging:    No imaging today. MRI Results (most recent): Allergies   Allergen Reactions    Pcn [Penicillins] Itching     Lips itching    Femara [Letrozole] Other (comments)     htn       Current Outpatient Medications   Medication Sig    atorvastatin (LIPITOR) 40 mg tablet TAKE 1 TABLET BY MOUTH  DAILY    olmesartan (BENICAR) 40 mg tablet TAKE ONE TABLET BY MOUTH DAILY    metFORMIN (GLUCOPHAGE) 500 mg tablet TAKE 1 TABLET BY MOUTH  TWICE DAILY WITH MEALS    vitamin B complex (B COMPLEX PO) Take  by mouth.  omeprazole (PRILOSEC) 20 mg capsule 20 mg.    gabapentin (NEURONTIN) 300 mg capsule Take 300 mg by mouth. Takes 2 tabs at night.     SPIRIVA RESPIMAT 2.5 mcg/actuation inhaler Take  by inhalation as needed.  budesonide-formoterol (SYMBICORT) 160-4.5 mcg/actuation HFAA Take 2 Puffs by inhalation two (2) times a day. (Patient taking differently: Take 2 Puffs by inhalation as needed.)    albuterol (PROVENTIL HFA, VENTOLIN HFA, PROAIR HFA) 90 mcg/actuation inhaler Take 2 Puffs by inhalation every six (6) hours as needed for Wheezing.  calcium-cholecalciferol, D3, (CALTRATE 600+D) tablet Take 1 Tab by mouth daily. No current facility-administered medications for this visit.        Past Medical History:   Diagnosis Date    Breast cancer [facility]) 2003    lumpectomy + radiation right breast    Diabetes mellitus, controlled ([facility] [address] 9/3/2015    Encounter for screening mammogram for breast cancer 11/20/2018    Dr. Otto Harris - normal - repeat in 1 year     Genital herpes 3/10/2010    GERD (gastroesophageal reflux disease)     HTN, goal below 140/90 3/8/2010    Hyperlipemia 11/26/2014    Osteopenia 03/10/2010    Reclast    Screening mammogram, encounter for 09/17/2018    Dr. Otto Harris - possible mass on right breast     Screening mammogram, encounter for 12/24/2019    normal - repeat in 1 year        Past Surgical History:   Procedure Laterality Date    ENDOSCOPY, COLON, DIAGNOSTIC  2000    HX APPENDECTOMY      HX BREAST LUMPECTOMY      R breast - lumpectomy + radiation - on arimidex x 5 years - Dr. Burgos Res HX LITHOTRIPSY  2006    kidney stone    HX ORTHOPAEDIC  [id number]    bilat hips replaced - needs keflex prior to dental procedure    HX ORTHOPAEDIC      bilateral foot fusion of great hallux. hammer toe    HX TONSILLECTOMY         Family History   Problem Relation Age of Onset    OSTEOARTHRITIS Mother     Stroke Father     OSTEOARTHRITIS Maternal Grandmother         Social History     Tobacco Use    Smoking status: Never Smoker    Smokeless tobacco: Never Used   Vaping Use    Vaping Use: Never used   Substance Use Topics    Alcohol use: Yes     Comment: red wine 7/wk    Drug use: No        Review of Systems       Vitals:  Ht 5' 3\" (1.6 m)   Wt 180 lb (81.6 kg)   BMI 31.89 kg/m²    Body mass index is 31.89 kg/m². Ortho Exam       Integumentary  Assessment of Surgical Incision - healing and consistent with normal anticipated wound healing. Neurologic  Sensory  Light Touch - Intact - Globally. Overall Assessment of Muscle Strength and Tone reveals  Lower Extremities - Right Iliopsoas - 5/5. Left Iliopsoas - 5/5. Right Tibialis Anterior - 5/5. Left Tibialis Anterior - 3/5. Right Gastroc-Soleus - 5/5. Left Gastroc-Soleus - 5/5. Right EHL - 5/5. Left EHL - 5/5. She has a walking cane on the right-hand side and does use an AFO on the left-hand side. General Assessment of Reflexes  Right Ankle - Clonus is not present. Left Ankle - Clonus is not present. Reflexes (Dermatomes)  2/2 Normal - Left Achilles (L5-S2), Left Knee (L2-4), Right Achilles (L5-S2) and Right Knee (L2-4). Musculoskeletal  Global Assessment  Examination of related systems reveals - well-developed, well-nourished, in no acute distress, alert and oriented x 3 and normal coordination. Spine/Ribs/Pelvis  Lumbosacral Spine - Examination of the lumbosacral spine reveals - no known fractures or deformities. Inspection and Palpation - Tenderness - moderate. Assessment of pain reveals the following findings - The pain is characterized as - moderate. Location - pain refers to lower back bilaterally.

## 2022-04-14 NOTE — LETTER
4/14/2022    Patient: Flavia Eldridge   YOB: 1945   Date of Visit: 4/14/2022     Susana Herzog MD  [address] [facility]    Dear Susana Herzog MD,      Thank you for referring Ms. Carole Centeno to [facility] for evaluation. My notes for this consultation are attached. If you have questions, please do not hesitate to call me. I look forward to following your patient along with you.       Sincerely,    Alina Baker MD

## 2022-05-11 NOTE — TELEPHONE ENCOUNTER
Last Visit: 12/13/21 MIRYAM Doran  Next Appointment: 6/13/22 MD Fagan  Previous Refill Encounter(s): 8/16/21 90 + 2    Requested Prescriptions     Pending Prescriptions Disp Refills    olmesartan (BENICAR) 40 mg tablet 90 Tablet 1     Sig: Take 1 Tablet by mouth daily.

## 2022-05-13 RX ORDER — OLMESARTAN MEDOXOMIL 40 MG/1
40 TABLET ORAL DAILY
Qty: 90 TABLET | Refills: 1 | Status: SHIPPED | OUTPATIENT
Start: 2022-05-13

## 2022-05-20 DIAGNOSIS — Z98.1 S/P SPINAL FUSION: Primary | ICD-10-CM

## 2022-05-24 DIAGNOSIS — E11.9 DIABETES TYPE 2, CONTROLLED (HCC): ICD-10-CM

## 2022-05-26 RX ORDER — METFORMIN HYDROCHLORIDE 500 MG/1
TABLET ORAL
Qty: 180 TABLET | Refills: 3 | Status: SHIPPED | OUTPATIENT
Start: 2022-05-26

## 2022-06-06 ENCOUNTER — OFFICE VISIT (OUTPATIENT)
Dept: FAMILY MEDICINE CLINIC | Age: 77
End: 2022-06-06
Payer: MEDICARE

## 2022-06-06 VITALS
BODY MASS INDEX: 31.75 KG/M2 | SYSTOLIC BLOOD PRESSURE: 138 MMHG | HEART RATE: 89 BPM | TEMPERATURE: 97.3 F | RESPIRATION RATE: 16 BRPM | HEIGHT: 63 IN | DIASTOLIC BLOOD PRESSURE: 72 MMHG | OXYGEN SATURATION: 98 % | WEIGHT: 179.2 LBS

## 2022-06-06 DIAGNOSIS — I10 HTN, GOAL BELOW 140/90: ICD-10-CM

## 2022-06-06 DIAGNOSIS — R10.32 LLQ ABDOMINAL PAIN: ICD-10-CM

## 2022-06-06 DIAGNOSIS — C50.411 MALIGNANT NEOPLASM OF UPPER-OUTER QUADRANT OF RIGHT BREAST IN FEMALE, ESTROGEN RECEPTOR POSITIVE (HCC): ICD-10-CM

## 2022-06-06 DIAGNOSIS — E78.5 HYPERLIPIDEMIA, UNSPECIFIED HYPERLIPIDEMIA TYPE: ICD-10-CM

## 2022-06-06 DIAGNOSIS — Z17.0 MALIGNANT NEOPLASM OF UPPER-OUTER QUADRANT OF RIGHT BREAST IN FEMALE, ESTROGEN RECEPTOR POSITIVE (HCC): ICD-10-CM

## 2022-06-06 DIAGNOSIS — Z00.00 ENCOUNTER FOR MEDICARE ANNUAL WELLNESS EXAM: Primary | ICD-10-CM

## 2022-06-06 DIAGNOSIS — E11.9 CONTROLLED TYPE 2 DIABETES MELLITUS WITHOUT COMPLICATION, WITHOUT LONG-TERM CURRENT USE OF INSULIN (HCC): ICD-10-CM

## 2022-06-06 DIAGNOSIS — K52.9 CHRONIC DIARRHEA: ICD-10-CM

## 2022-06-06 LAB
ALBUMIN SERPL-MCNC: 4.2 G/DL (ref 3.5–5)
ALBUMIN/GLOB SERPL: 1.4 {RATIO} (ref 1.1–2.2)
ALP SERPL-CCNC: 92 U/L (ref 45–117)
ALT SERPL-CCNC: 12 U/L (ref 12–78)
ANION GAP SERPL CALC-SCNC: 7 MMOL/L (ref 5–15)
AST SERPL-CCNC: 15 U/L (ref 15–37)
BILIRUB SERPL-MCNC: 0.6 MG/DL (ref 0.2–1)
BUN SERPL-MCNC: 19 MG/DL (ref 6–20)
BUN/CREAT SERPL: 28 (ref 12–20)
CALCIUM SERPL-MCNC: 9.8 MG/DL (ref 8.5–10.1)
CHLORIDE SERPL-SCNC: 104 MMOL/L (ref 97–108)
CHOLEST SERPL-MCNC: 166 MG/DL
CO2 SERPL-SCNC: 25 MMOL/L (ref 21–32)
CREAT SERPL-MCNC: 0.69 MG/DL (ref 0.55–1.02)
ERYTHROCYTE [DISTWIDTH] IN BLOOD BY AUTOMATED COUNT: 12.9 % (ref 11.5–14.5)
EST. AVERAGE GLUCOSE BLD GHB EST-MCNC: 131 MG/DL
GLOBULIN SER CALC-MCNC: 2.9 G/DL (ref 2–4)
GLUCOSE SERPL-MCNC: 116 MG/DL (ref 65–100)
HBA1C MFR BLD: 6.2 % (ref 4–5.6)
HCT VFR BLD AUTO: 44.2 % (ref 35–47)
HDLC SERPL-MCNC: 67 MG/DL
HDLC SERPL: 2.5 {RATIO} (ref 0–5)
HGB BLD-MCNC: 14 G/DL (ref 11.5–16)
LDLC SERPL CALC-MCNC: 76.2 MG/DL (ref 0–100)
LIPASE SERPL-CCNC: 119 U/L (ref 73–393)
MCH RBC QN AUTO: 30.1 PG (ref 26–34)
MCHC RBC AUTO-ENTMCNC: 31.7 G/DL (ref 30–36.5)
MCV RBC AUTO: 95.1 FL (ref 80–99)
NRBC # BLD: 0 K/UL (ref 0–0.01)
NRBC BLD-RTO: 0 PER 100 WBC
PLATELET # BLD AUTO: 285 K/UL (ref 150–400)
PMV BLD AUTO: 11 FL (ref 8.9–12.9)
POTASSIUM SERPL-SCNC: 4.8 MMOL/L (ref 3.5–5.1)
PROT SERPL-MCNC: 7.1 G/DL (ref 6.4–8.2)
RBC # BLD AUTO: 4.65 M/UL (ref 3.8–5.2)
SODIUM SERPL-SCNC: 136 MMOL/L (ref 136–145)
T4 SERPL-MCNC: 8.3 UG/DL (ref 4.8–13.9)
TRIGL SERPL-MCNC: 114 MG/DL (ref ?–150)
TSH SERPL DL<=0.05 MIU/L-ACNC: 1.24 UIU/ML (ref 0.36–3.74)
VLDLC SERPL CALC-MCNC: 22.8 MG/DL
WBC # BLD AUTO: 6.9 K/UL (ref 3.6–11)

## 2022-06-06 PROCEDURE — G8536 NO DOC ELDER MAL SCRN: HCPCS | Performed by: FAMILY MEDICINE

## 2022-06-06 PROCEDURE — 1090F PRES/ABSN URINE INCON ASSESS: CPT | Performed by: FAMILY MEDICINE

## 2022-06-06 PROCEDURE — 3046F HEMOGLOBIN A1C LEVEL >9.0%: CPT | Performed by: FAMILY MEDICINE

## 2022-06-06 PROCEDURE — 2022F DILAT RTA XM EVC RTNOPTHY: CPT | Performed by: FAMILY MEDICINE

## 2022-06-06 PROCEDURE — G0463 HOSPITAL OUTPT CLINIC VISIT: HCPCS | Performed by: FAMILY MEDICINE

## 2022-06-06 PROCEDURE — 1101F PT FALLS ASSESS-DOCD LE1/YR: CPT | Performed by: FAMILY MEDICINE

## 2022-06-06 PROCEDURE — G8432 DEP SCR NOT DOC, RNG: HCPCS | Performed by: FAMILY MEDICINE

## 2022-06-06 PROCEDURE — G9899 SCRN MAM PERF RSLTS DOC: HCPCS | Performed by: FAMILY MEDICINE

## 2022-06-06 PROCEDURE — G8752 SYS BP LESS 140: HCPCS | Performed by: FAMILY MEDICINE

## 2022-06-06 PROCEDURE — 3017F COLORECTAL CA SCREEN DOC REV: CPT | Performed by: FAMILY MEDICINE

## 2022-06-06 PROCEDURE — G8754 DIAS BP LESS 90: HCPCS | Performed by: FAMILY MEDICINE

## 2022-06-06 PROCEDURE — G0439 PPPS, SUBSEQ VISIT: HCPCS | Performed by: FAMILY MEDICINE

## 2022-06-06 PROCEDURE — 99214 OFFICE O/P EST MOD 30 MIN: CPT | Performed by: FAMILY MEDICINE

## 2022-06-06 PROCEDURE — G8417 CALC BMI ABV UP PARAM F/U: HCPCS | Performed by: FAMILY MEDICINE

## 2022-06-06 PROCEDURE — G8427 DOCREV CUR MEDS BY ELIG CLIN: HCPCS | Performed by: FAMILY MEDICINE

## 2022-06-06 PROCEDURE — 1123F ACP DISCUSS/DSCN MKR DOCD: CPT | Performed by: FAMILY MEDICINE

## 2022-06-06 NOTE — PROGRESS NOTES
This is the Subsequent Medicare Annual Wellness Exam, performed 12 months or more after the Initial AWV or the last Subsequent AWV    I have reviewed the patient's medical history in detail and updated the computerized patient record. Assessment/Plan   Education and counseling provided:  Are appropriate based on today's review and evaluation    1. Encounter for Medicare annual wellness exam  2. Chronic diarrhea  -     REFERRAL TO GASTROENTEROLOGY  -     CT ABD PELV W CONT; Future  -     LIPASE; Future  -     TSH 3RD GENERATION; Future  -     T4 (THYROXINE); Future  3. LLQ abdominal pain  -     REFERRAL TO GASTROENTEROLOGY  -     CT ABD PELV W CONT; Future  -     LIPASE; Future  4. Malignant neoplasm of upper-outer quadrant of right breast in female, estrogen receptor positive ([facility] [address]  5. Controlled type 2 diabetes mellitus without complication, without long-term current use of insulin (HCC)  -     HEMOGLOBIN A1C WITH EAG; Future  -     MICROALBUMIN, UR, RAND W/ MICROALB/CREAT RATIO; Future  6. HTN, goal below 140/90  7. Hyperlipidemia, unspecified hyperlipidemia type  -     METABOLIC PANEL, COMPREHENSIVE; Future  -     LIPID PANEL; Future  -     CBC W/O DIFF; Future       Depression Risk Factor Screening     3 most recent PHQ Screens 4/14/2022   Little interest or pleasure in doing things Not at all   Feeling down, depressed, irritable, or hopeless Not at all   Total Score PHQ 2 0       Alcohol & Drug Abuse Risk Screen    Do you average more than 1 drink per night or more than 7 drinks a week:  No    On any one occasion in the past three months have you have had more than 3 drinks containing alcohol:  No          Functional Ability and Level of Safety    Hearing: Hearing is good. Activities of Daily Living: The home contains: no safety equipment. Patient does total self care      Ambulation: with difficulty, uses a cane     Fall Risk:  Fall Risk Assessment, last 12 mths 12/13/2021   Able to walk?  Yes Fall in past 12 months? 0   Do you feel unsteady? 1   Are you worried about falling 1   Is TUG test greater than 12 seconds? 0   Is the gait abnormal? 1   Number of falls in past 12 months 2   Fall with injury?  -      Abuse Screen:  Patient is not abused       Cognitive Screening    Has your family/caregiver stated any concerns about your memory: no       Health Maintenance Due     Health Maintenance Due   Topic Date Due    Pneumococcal 65+ years (2 - PCV) 09/18/2015    COVID-19 Vaccine (3 - Booster for Moderna series) 08/05/2021    Colorectal Cancer Screening Combo  06/05/2022    MICROALBUMIN Q1  06/08/2022    Lipid Screen  06/08/2022       Patient Care Team   Patient Care Team:  Ada Amaya MD as PCP - General (Family Medicine)  Ada Amaya MD as PCP - [facility] Empaneled Provider  Lucy Andrade MD (Nephrology)  Mehreen Larsen MD (Orthopedic Surgery)    History     Patient Active Problem List   Diagnosis Code    HTN, goal below 140/90 I10    Osteopenia M85.80    Genital herpes A60.00    Hyperlipemia E78.5    Diabetes mellitus, controlled ([address] E11.9    GERD (gastroesophageal reflux disease) K21.9    Malignant neoplasm of right female breast ([address] C50.911    Obesity, Class I, BMI 30-34.9 E66.9    Osteoporosis M81.0     Past Medical History:   Diagnosis Date    Breast cancer ([address] 2003    lumpectomy + radiation right breast    Diabetes mellitus, controlled ([address] 9/3/2015    Encounter for screening mammogram for breast cancer 11/20/2018    Dr. Rickie Lilly - normal - repeat in 1 year     Genital herpes 3/10/2010    GERD (gastroesophageal reflux disease)     HTN, goal below 140/90 3/8/2010    Hyperlipemia 11/26/2014    Osteopenia 03/10/2010    Reclast    Screening mammogram, encounter for 09/17/2018    Dr. Rickie Lilly - possible mass on right breast     Screening mammogram, encounter for 12/24/2019    normal - repeat in 1 year      Past Surgical History:   Procedure Laterality Date    ENDOSCOPY, COLON, DIAGNOSTIC  2000    HX APPENDECTOMY      HX BREAST LUMPECTOMY      R breast - lumpectomy + radiation - on arimidex x 5 years - Dr. Chuck Way HX LITHOTRIPSY  2006    kidney stone    HX ORTHOPAEDIC  [id number]    bilat hips replaced - needs keflex prior to dental procedure    HX ORTHOPAEDIC      bilateral foot fusion of great hallux. hammer toe    HX TONSILLECTOMY       Current Outpatient Medications   Medication Sig Dispense Refill    OTHER CBD oil      metFORMIN (GLUCOPHAGE) 500 mg tablet TAKE 1 TABLET BY MOUTH  TWICE DAILY WITH MEALS 180 Tablet 3    olmesartan (BENICAR) 40 mg tablet Take 1 Tablet by mouth daily. 90 Tablet 1    atorvastatin (LIPITOR) 40 mg tablet TAKE 1 TABLET BY MOUTH  DAILY 90 Tablet 3    omeprazole (PRILOSEC) 20 mg capsule 20 mg.      SPIRIVA RESPIMAT 2.5 mcg/actuation inhaler Take  by inhalation as needed.  budesonide-formoterol (SYMBICORT) 160-4.5 mcg/actuation HFAA Take 2 Puffs by inhalation two (2) times a day. (Patient taking differently: Take 2 Puffs by inhalation as needed.) 1 Inhaler 5    albuterol (PROVENTIL HFA, VENTOLIN HFA, PROAIR HFA) 90 mcg/actuation inhaler Take 2 Puffs by inhalation every six (6) hours as needed for Wheezing. 1 Inhaler 2    calcium-cholecalciferol, D3, (CALTRATE 600+D) tablet Take 1 Tab by mouth daily.        Allergies   Allergen Reactions    Pcn [Penicillins] Itching     Lips itching    Femara [Letrozole] Other (comments)     htn       Family History   Problem Relation Age of Onset    OSTEOARTHRITIS Mother     Stroke Father     OSTEOARTHRITIS Maternal Grandmother      Social History     Tobacco Use    Smoking status: Never Smoker    Smokeless tobacco: Never Used   Substance Use Topics    Alcohol use: Yes     Comment: red wine 7/wk         Itzel Meyer MD

## 2022-06-06 NOTE — PROGRESS NOTES
Chief Complaint   Patient presents with    Abdominal Pain     x1 month, left sided     Diarrhea     pt has been experiencing for a couple months        1. \"Have you been to the ER, urgent care clinic since your last visit? Hospitalized since your last visit? \" No    2. \"Have you seen or consulted any other health care providers outside of the [address] [place] [address] since your last visit? \" No     3. For patients aged 39-70: Has the patient had a colonoscopy / FIT/ Cologuard? Yes - Care Gap present. Most recent result on file      If the patient is female:    4. For patients aged 41-77: Has the patient had a mammogram within the past 2 years? Yes - no Care Gap present      5. For patients aged 21-65: Has the patient had a pap smear?  NA - based on age or sex    3 most recent PHQ Screens 4/14/2022   Little interest or pleasure in doing things Not at all   Feeling down, depressed, irritable, or hopeless Not at all   Total Score PHQ 2 0

## 2022-06-06 NOTE — PROGRESS NOTES
Patient Name: Oren Dunn   MRN: [id number]    [address] [place] [address] is a 78 y.o. female who presents with the following:     Patient reports 2-month history of looser stools. Also has intermittent left lower quadrant pain that started about a month ago. Noticeable when she is sitting in a computer or her pants are too tight. Denies any blood in stool, recent antibiotic use, or recent travel. Does not notice any particular pattern with certain types of food. Does not drink much alcohol. Still has her gallbladder intact. She does have a upcoming screening colonoscopy on July 25; previous one was normal 10 years ago. Due for DM labs. Wt Readings from Last 3 Encounters:   06/06/22 179 lb 3.2 oz (81.3 kg)   04/14/22 180 lb (81.6 kg)   12/13/21 181 lb 3.2 oz (82.2 kg)       Review of Systems   Constitutional: Negative for fever, malaise/fatigue and weight loss. Respiratory: Negative for cough, hemoptysis, shortness of breath and wheezing. Cardiovascular: Negative for chest pain, palpitations, leg swelling and PND. Gastrointestinal: Positive for abdominal pain and diarrhea. Negative for blood in stool, constipation, melena, nausea and vomiting. The patient's medications, allergies, past medical history, surgical history, family history and social history were reviewed and updated where appropriate. Current Outpatient Medications:     OTHER, CBD oil, Disp: , Rfl:     metFORMIN (GLUCOPHAGE) 500 mg tablet, TAKE 1 TABLET BY MOUTH  TWICE DAILY WITH MEALS, Disp: 180 Tablet, Rfl: 3    olmesartan (BENICAR) 40 mg tablet, Take 1 Tablet by mouth daily. , Disp: 90 Tablet, Rfl: 1    atorvastatin (LIPITOR) 40 mg tablet, TAKE 1 TABLET BY MOUTH  DAILY, Disp: 90 Tablet, Rfl: 3    omeprazole (PRILOSEC) 20 mg capsule, 20 mg., Disp: , Rfl:     SPIRIVA RESPIMAT 2.5 mcg/actuation inhaler, Take  by inhalation as needed. , Disp: , Rfl:     budesonide-formoterol (SYMBICORT) 160-4.5 mcg/actuation HFAA, Take 2 Puffs by inhalation two (2) times a day. (Patient taking differently: Take 2 Puffs by inhalation as needed.), Disp: 1 Inhaler, Rfl: 5    albuterol (PROVENTIL HFA, VENTOLIN HFA, PROAIR HFA) 90 mcg/actuation inhaler, Take 2 Puffs by inhalation every six (6) hours as needed for Wheezing., Disp: 1 Inhaler, Rfl: 2    calcium-cholecalciferol, D3, (CALTRATE 600+D) tablet, Take 1 Tab by mouth daily. , Disp: , Rfl:     vitamin B complex (B COMPLEX PO), Take  by mouth., Disp: , Rfl:     gabapentin (NEURONTIN) 300 mg capsule, Take 300 mg by mouth. Takes 2 tabs at night., Disp: , Rfl:     Allergies   Allergen Reactions    Pcn [Penicillins] Itching     Lips itching    Femara [Letrozole] Other (comments)     htn         OBJECTIVE    Visit Vitals  /72 (BP 1 Location: Left upper arm, BP Patient Position: Sitting, BP Cuff Size: Adult)   Pulse 89   Temp 97.3 °F (36.3 °C) (Temporal)   Resp 16   Ht 5' 3\" (1.6 m)   Wt 179 lb 3.2 oz (81.3 kg)   SpO2 98%   BMI 31.74 kg/m²       Physical Exam  Constitutional:       General: She is not in acute distress. Appearance: She is not diaphoretic. HENT:      Head: Normocephalic. Right Ear: External ear normal.      Left Ear: External ear normal.   Eyes:      Conjunctiva/sclera: Conjunctivae normal.      Pupils: Pupils are equal, round, and reactive to light. Cardiovascular:      Rate and Rhythm: Normal rate and regular rhythm. Heart sounds: Normal heart sounds. No murmur heard. No friction rub. No gallop. Pulmonary:      Effort: Pulmonary effort is normal. No respiratory distress. Breath sounds: Normal breath sounds. No wheezing or rales. Abdominal:      General: Bowel sounds are normal. There is no distension. Palpations: Abdomen is soft. Tenderness: There is no abdominal tenderness. There is no guarding or rebound. Skin:     General: Skin is warm and dry. Neurological:      Mental Status: She is alert and oriented to person, place, and time. Psychiatric:         Mood and Affect: Affect normal.         Cognition and Memory: Memory normal.         Judgment: Judgment normal.           ASSESSMENT AND PLAN  Ashley Corado is a 78 y.o. female who presents today for:    1. Encounter for Medicare annual wellness exam    2. Chronic diarrhea  Advised to ask for earlier colonoscopy. Will do labs and CT scan. Recommend fiber supplement and probiotic. Consider stool studies if persistent.  - REFERRAL TO GASTROENTEROLOGY  - CT ABD PELV W CONT; Future  - LIPASE; Future  - TSH 3RD GENERATION; Future  - T4 (THYROXINE); Future  - T4 (THYROXINE)  - TSH 3RD GENERATION  - LIPASE    3. LLQ abdominal pain  As above. - REFERRAL TO GASTROENTEROLOGY  - CT ABD PELV W CONT; Future  - LIPASE; Future  - LIPASE    4. Malignant neoplasm of upper-outer quadrant of right breast in female, estrogen receptor positive ([facility] [address]  Stable, continue current treatment. 5. Controlled type 2 diabetes mellitus without complication, without long-term current use of insulin (HCC)  Stable, continue current treatment pending review of labs. - HEMOGLOBIN A1C WITH EAG; Future  - MICROALBUMIN, UR, RAND W/ MICROALB/CREAT RATIO; Future  - HEMOGLOBIN A1C WITH EAG    6. HTN, goal below 140/90  Stable, continue current treatment. 7. Hyperlipidemia, unspecified hyperlipidemia type  - METABOLIC PANEL, COMPREHENSIVE; Future  - LIPID PANEL; Future  - CBC W/O DIFF; Future  - CBC W/O DIFF  - LIPID PANEL  - METABOLIC PANEL, COMPREHENSIVE       Medications Discontinued During This Encounter   Medication Reason    gabapentin (NEURONTIN) 300 mg capsule LIST CLEANUP    vitamin B complex (B COMPLEX PO) LIST CLEANUP           Treatment risks/benefits/costs/interactions/alternatives discussed with patient.   Advised patient to call back or return to office if symptoms worsen/change/persist. If patient cannot reach us or should anything more severe/urgent arise he/she should proceed directly to the nearest emergency department. Discussed expected course/resolution/complications of diagnosis in detail with patient. Patient expressed understanding with the diagnosis and plan. This dictation may have been completed with Dragon, the computer voice recognition software. Unanticipated grammatical, syntax, homophones, and other interpretive errors are sometimes inadvertently transcribed by the computer software. Please disregard any errors that have escaped final proofreading. Aivi N. Valentino Leber M.D.

## 2022-06-07 NOTE — PROGRESS NOTES
Dear Ms.  [place],    I wanted to follow up on your recent test results:    A1c is at goal. Cholesterol is normal. Other labs are also normal.

## 2022-06-09 ENCOUNTER — HOSPITAL ENCOUNTER (OUTPATIENT)
Dept: CT IMAGING | Age: 77
Discharge: HOME | End: 2022-06-09
Attending: FAMILY MEDICINE
Payer: MEDICARE

## 2022-06-09 DIAGNOSIS — R10.32 LLQ ABDOMINAL PAIN: ICD-10-CM

## 2022-06-09 DIAGNOSIS — K52.9 CHRONIC DIARRHEA: ICD-10-CM

## 2022-06-09 PROCEDURE — 74011000636 HC RX REV CODE- 636: Performed by: RADIOLOGY

## 2022-06-09 PROCEDURE — 74177 CT ABD & PELVIS W/CONTRAST: CPT

## 2022-06-09 RX ADMIN — IOPAMIDOL 100 ML: 755 INJECTION, SOLUTION INTRAVENOUS at 14:14

## 2022-08-01 NOTE — PROGRESS NOTES
ASSESSMENT/PLAN:  Below is the assessment and plan developed based on review of pertinent history, physical exam, labs, studies, and medications. 1. Shoulder pain, unspecified chronicity, unspecified laterality      No follow-ups on file. In discussion with the patient, we considered the numerus possible diagnoses that could be contributing to their present symptoms. We also deliberated on the extensive management options that must be considered to treat their current condition. We reviewed their accessible prior medical records, diagnostic tests, and current health and employment information. We considered how these symptoms were affecting the patient´s activities of daily living as well as employment and fitness activities. The patient had various questions regarding the possible risks, benefits, complications, morbidity and mortality regarding their diagnosis and treatment options. The patients´ comorbidities were considered, and I advocated that they consider maximizing lifestyle modification through nutrition and exercise to aid in addressing their symptoms. Shared decision making yielded an understanding to move forward with conservation treatment preferences. The patient expressed understanding that if conservative management fails to alleviate the present symptoms they will return to office for re-evaluation and consideration of additional diagnostic tests and potential surgical options. In the interim, we have recommended ice, elevation, and take prescription anti-inflammatory medications along with a physician directed home exercise program. We discussed the risks and common side effects of anti-inflammatory medications and instructed the patient to discontinue the medication and contact us if they experienced any side effects. The patient was encouraged to discuss the possible side effects with their family physician or pharmacist prior to initiating any new medications.     We discussed the fact that many of the recommended treatment options presented are significantly limited by the patient´s social determinants of health. We also reviewed the circumstances surrounding the environment that they live and work which affect a wide range of health risk. We considered the limited access to appropriate educational resources regarding proper nutrition and exercise as well as the economic and social support necessary to maintain health and wellbeing. We discussed the possibility of an injection of a steroid mixed with a local anesthetic to relieve pain and decrease inflammation in the left shoulder. The risks of a steroid injection which include but are not limited to cartilage damage, death of nearby bone, joint infection, nerve damage, temporary facial flushing, temporary steroid flare of pain and inflammation in the joint, temporary increase in blood sugar, tendon weakening or rupture, thinning of nearby bone (osteoporosis), thinning of skin and soft tissue around the injection site, and whitening or lightening of the skin around the injection site were reviewed at length. We specifically advised that patients with diabetes talk to their primary care to ensure they are safe for a steroid injection due to the transient increase in blood sugar associated with the injection. We discussed the chance of increased bleeding and bruising if the patient is on blood thinners or certain dietary supplements that have a blood-thinning effect. We advised patients that have an active infection, history of allergic reactions to steroids or take medications that may prohibit them from receiving a steroid injection to talk to their primary care physician before receiving and injection. We also talked about limiting the number of injections because these potential side effects increase with larger doses and repeated use.     After explaining the risks and benefits of the procedure and obtaining verbal informed consent from the patient, the proposed area for injection was confirmed with the patient. After all questions and concerns were addressed, the skin was prepped with alcohol to reduce the chances of infection. The skin was anesthetized with topical ethylene chloride spray and a mixture of two milliliters of Depo-Medrol (40mg/ml), one milliliter of Lidocaine and one milliliter of Marcaine was injected slowly into the left shoulder in a sterile fashion without difficulty. The needle was removed and disposed of in a sterile container. The patient tolerated the injection well and a band-aid was placed on the skin. The patient was counseled on protecting the injection area, avoiding water submersion for 2 days, icing for pain relief and looking for signs and symptoms of infection. We requested that the patient contact us if any symptoms persist greater than 48 hours after the injection. Given that the patient's symptoms are increasing in frequency and duration we have decided to prescribe physical therapy. We talked about the fact that the goal of physical therapy is for the therapist to assist in developing a program to help return the patient to full strength, function and mobility and decrease pain. We also discussed that the therapist may combine several techniques to help decrease pain. These include but are not limited to stretching, balance exercises, strength training, massage, cold and heat therapy, and electrical stimulation. Although, physical therapy is generally safe, we went over the potential risks to include the worsening of pre-existing conditions, continued pain and no improvement in flexibility, mobility, and strength. We will have the patient follow up after physical therapy to closely monitor their progress. We talked about following up sooner if therapy is not progressing on a weekly basis. We talked about the fact that I was concerned that she was developing a frozen shoulder from her arthritis. We will see how the injection helps as well as some physical therapy. I will see her back in 6 weeks time to evaluate her progress. We talked about sending her to our shoulder arthritis specialist if she continued to have difficulty. SUBJECTIVE/OBJECTIVE:  Julieth Burt (: 1945) is a 78 y.o. female, patient,here for evaluation of the No chief complaint on file. .   Of note, I have evaluated her for the right shoulder in . She was diagnosed with significant glenohumeral osteoarthritis and referred to one of our arthritis specialist.  Unfortunately, she woke up last Thursday without trauma and had shoulder pain in the left side. She reports has been aching at night. She denies any numbness or tingling or neck pain. She denies any falls. She reports she has been using crutches recently in an effort to help her lower extremity ambulation. She has been icing elevating. She has had to stop taking anti-inflammatory medications due to her microcolitis. She has a history of right shoulder arthritis. Physical Exam    Upon physical examination, the patient is well developed, well nourished, alert and oriented times three, with normal mood and affect and walks with a normal gait. Upon examination of the left shoulder, the patient sits with the scapula protracted and depressed. They are tender to palpation over the trapezius and rhomboids as well as the anterior aspect of the supraspinatus and biceps. They are non-tender to palpation over the neck, clavicle, scapula, and elbow. There is no soft tissue swelling and eccymosis. The patient has limited range of motion of the shoulder in all planes secondary to discomfort. The patient is unable to tolerate stability testing. They have 5/5 strength at their side, and are neurovascularly intact distally. There is no erythema, warmth or skin lesions present.     On examination of the contralateral extremity, the patient is nontender to palpation and has excellent range of motion, stability and strength. Imaging:    3 views of the left shoulder show no fracture or dislocation. She does have significant glenohumeral arthritis. Allergies   Allergen Reactions    Pcn [Penicillins] Itching     Lips itching    Femara [Letrozole] Other (comments)     htn       Current Outpatient Medications   Medication Sig    OTHER CBD oil    metFORMIN (GLUCOPHAGE) 500 mg tablet TAKE 1 TABLET BY MOUTH  TWICE DAILY WITH MEALS    olmesartan (BENICAR) 40 mg tablet Take 1 Tablet by mouth daily. atorvastatin (LIPITOR) 40 mg tablet TAKE 1 TABLET BY MOUTH  DAILY    omeprazole (PRILOSEC) 20 mg capsule 20 mg. SPIRIVA RESPIMAT 2.5 mcg/actuation inhaler Take  by inhalation as needed. budesonide-formoterol (SYMBICORT) 160-4.5 mcg/actuation HFAA Take 2 Puffs by inhalation two (2) times a day. (Patient taking differently: Take 2 Puffs by inhalation as needed.)    albuterol (PROVENTIL HFA, VENTOLIN HFA, PROAIR HFA) 90 mcg/actuation inhaler Take 2 Puffs by inhalation every six (6) hours as needed for Wheezing. calcium-cholecalciferol, D3, (CALTRATE 600+D) tablet Take 1 Tab by mouth daily. No current facility-administered medications for this visit.        Past Medical History:   Diagnosis Date    Breast cancer [facility]) 2003    lumpectomy + radiation right breast    Diabetes mellitus, controlled ([facility] [address] 9/3/2015    Encounter for screening mammogram for breast cancer 11/20/2018    Dr. Jaycee Lewis - normal - repeat in 1 year     Genital herpes 3/10/2010    GERD (gastroesophageal reflux disease)     HTN, goal below 140/90 3/8/2010    Hyperlipemia 11/26/2014    Osteopenia 03/10/2010    Reclast    Screening mammogram, encounter for 09/17/2018    Dr. Jaycee Lewis - possible mass on right breast     Screening mammogram, encounter for 12/24/2019    normal - repeat in 1 year       Past Surgical History:   Procedure Laterality Date    ENDOSCOPY, COLON, DIAGNOSTIC  2000    HX APPENDECTOMY      HX BREAST LUMPECTOMY R breast - lumpectomy + radiation - on arimidex x 5 years - Dr. Tamia Bui    HX LITHOTRIPSY  2006    kidney stone    HX ORTHOPAEDIC  1667-2025    bilat hips replaced - needs keflex prior to dental procedure    HX ORTHOPAEDIC      bilateral foot fusion of great hallux. hammer toe    HX TONSILLECTOMY         Family History   Problem Relation Age of Onset    OSTEOARTHRITIS Mother     Stroke Father     OSTEOARTHRITIS Maternal Grandmother        Social History     Socioeconomic History    Marital status:      Spouse name: Not on file    Number of children: Not on file    Years of education: Not on file    Highest education level: Not on file   Occupational History    Not on file   Tobacco Use    Smoking status: Never    Smokeless tobacco: Never   Vaping Use    Vaping Use: Never used   Substance and Sexual Activity    Alcohol use: Yes     Comment: red wine 7/wk    Drug use: No    Sexual activity: Not Currently   Other Topics Concern     Service No    Blood Transfusions No    Caffeine Concern No    Occupational Exposure No    Hobby Hazards No    Sleep Concern No    Stress Concern No    Weight Concern Yes     Comment: weight gain     Special Diet No    Back Care No    Exercise Yes     Comment: walks up stairs     Bike Helmet No    Seat Belt Yes    Self-Exams Yes   Social History Narrative    Not on file     Social Determinants of Health     Financial Resource Strain: Not on file   Food Insecurity: Not on file   Transportation Needs: Not on file   Physical Activity: Not on file   Stress: Not on file   Social Connections: Not on file   Intimate Partner Violence: Not on file   Housing Stability: Not on file       Review of Systems    No flowsheet data found. Vitals: There were no vitals taken for this visit. There is no height or weight on file to calculate BMI. An electronic signature was used to authenticate this note.   -- Ethel Velazquez MD

## 2022-08-02 ENCOUNTER — OFFICE VISIT (OUTPATIENT)
Dept: ORTHOPEDIC SURGERY | Age: 77
End: 2022-08-02
Payer: MEDICARE

## 2022-08-02 VITALS — BODY MASS INDEX: 31.71 KG/M2 | HEIGHT: 63 IN | WEIGHT: 179 LBS

## 2022-08-02 DIAGNOSIS — M19.012 OSTEOARTHRITIS OF LEFT SHOULDER, UNSPECIFIED OSTEOARTHRITIS TYPE: ICD-10-CM

## 2022-08-02 DIAGNOSIS — M25.519 SHOULDER PAIN, UNSPECIFIED CHRONICITY, UNSPECIFIED LATERALITY: Primary | ICD-10-CM

## 2022-08-02 PROCEDURE — G8427 DOCREV CUR MEDS BY ELIG CLIN: HCPCS | Performed by: ORTHOPAEDIC SURGERY

## 2022-08-02 PROCEDURE — G8536 NO DOC ELDER MAL SCRN: HCPCS | Performed by: ORTHOPAEDIC SURGERY

## 2022-08-02 PROCEDURE — G8432 DEP SCR NOT DOC, RNG: HCPCS | Performed by: ORTHOPAEDIC SURGERY

## 2022-08-02 PROCEDURE — 1090F PRES/ABSN URINE INCON ASSESS: CPT | Performed by: ORTHOPAEDIC SURGERY

## 2022-08-02 PROCEDURE — 3017F COLORECTAL CA SCREEN DOC REV: CPT | Performed by: ORTHOPAEDIC SURGERY

## 2022-08-02 PROCEDURE — 20610 DRAIN/INJ JOINT/BURSA W/O US: CPT | Performed by: ORTHOPAEDIC SURGERY

## 2022-08-02 PROCEDURE — G8756 NO BP MEASURE DOC: HCPCS | Performed by: ORTHOPAEDIC SURGERY

## 2022-08-02 PROCEDURE — G8417 CALC BMI ABV UP PARAM F/U: HCPCS | Performed by: ORTHOPAEDIC SURGERY

## 2022-08-02 PROCEDURE — 1101F PT FALLS ASSESS-DOCD LE1/YR: CPT | Performed by: ORTHOPAEDIC SURGERY

## 2022-08-02 PROCEDURE — G9899 SCRN MAM PERF RSLTS DOC: HCPCS | Performed by: ORTHOPAEDIC SURGERY

## 2022-08-02 PROCEDURE — 99214 OFFICE O/P EST MOD 30 MIN: CPT | Performed by: ORTHOPAEDIC SURGERY

## 2022-08-02 PROCEDURE — 1123F ACP DISCUSS/DSCN MKR DOCD: CPT | Performed by: ORTHOPAEDIC SURGERY

## 2022-08-02 RX ORDER — BUPIVACAINE HYDROCHLORIDE 5 MG/ML
2 INJECTION, SOLUTION EPIDURAL; INTRACAUDAL ONCE
Status: COMPLETED | OUTPATIENT
Start: 2022-08-02 | End: 2022-08-02

## 2022-08-02 RX ORDER — BUDESONIDE 3 MG/1
CAPSULE, COATED PELLETS ORAL
COMMUNITY
Start: 2022-07-22

## 2022-08-02 RX ORDER — TRIAMCINOLONE ACETONIDE 40 MG/ML
40 INJECTION, SUSPENSION INTRA-ARTICULAR; INTRAMUSCULAR ONCE
Status: COMPLETED | OUTPATIENT
Start: 2022-08-02 | End: 2022-08-02

## 2022-08-02 RX ADMIN — BUPIVACAINE HYDROCHLORIDE 10 MG: 5 INJECTION, SOLUTION EPIDURAL; INTRACAUDAL at 08:50

## 2022-08-02 RX ADMIN — TRIAMCINOLONE ACETONIDE 40 MG: 40 INJECTION, SUSPENSION INTRA-ARTICULAR; INTRAMUSCULAR at 08:50

## 2022-12-01 RX ORDER — OLMESARTAN MEDOXOMIL 40 MG/1
40 TABLET ORAL DAILY
Qty: 90 TABLET | Refills: 1 | Status: SHIPPED | OUTPATIENT
Start: 2022-12-01

## 2022-12-01 NOTE — TELEPHONE ENCOUNTER
Last Visit: 6/6/22 MD Brandi Ramirez  Next Appointment: None  Previous Refill Encounter(s): 5/13/22 90 + 1    Requested Prescriptions     Pending Prescriptions Disp Refills    olmesartan (BENICAR) 40 mg tablet 90 Tablet 1     Sig: Take 1 Tablet by mouth daily. For [facility] in place:   Recommendation Provided To:    Intervention Detail: New Rx: 1, reason: Patient Preference  Gap Closed?:   Intervention Accepted By:   Time Spent (min): 5

## 2023-01-23 ENCOUNTER — TELEPHONE (OUTPATIENT)
Dept: FAMILY MEDICINE CLINIC | Age: 78
End: 2023-01-23

## 2023-01-23 DIAGNOSIS — N63.10 MASS OF RIGHT BREAST, UNSPECIFIED QUADRANT: Primary | ICD-10-CM

## 2023-01-23 NOTE — TELEPHONE ENCOUNTER
Luz, from [facility] Radiology Scheduling called & stated \" Bilateral Diagnostic Mammogram, due to a Lump in Right Breast. She is going to also need an Ultrasound. \"  PSR kept advising Luz that a message would need to be sent to Dr. Fernie Ibarra Nurse to verify the orders & for new orders to be put in. Luz stated \"You're not getting it. I want to speak to someone else. Do you use Epic? \" (Leopold Jack kept repeating herself multiple times, including mentioning Epic. PSR kept advising her that a message would be sent to Dr. Fernie Ibarra nurse   PSR spoke to [facility] & advised of the request, & per Karol Pisano was advised to tell Nellie Figueroa is no one else available to take her call. A message would be sent to Dr. Fernie Ibarra Nurse. \" Leopold Jack then stated \"I want to speak to the , what is the 's name?' PSR advised that the Manager's name is Jackelyn. Luz then stated \"I'm telling my supervisor and she's going to be calling the office. I'll even wait on hold. Is there no one else in the office? \" PSR advised that Jackelyn was not available at that time, and she would be returning her call.      Callback Phone Number: [phone]     Fax Number: [phone]   Attention: Luz ARAGON PSR PAF

## 2023-01-24 NOTE — TELEPHONE ENCOUNTER
Orders are in; please send to [facility] and verify if there are the right orders. If they need something to change, they need to specify the exact order.

## 2023-03-16 NOTE — PROGRESS NOTES
ASSESSMENT/PLAN:  Below is the assessment and plan developed based on review of pertinent history, physical exam, labs, studies, and medications. 1. Osteoarthritis of right shoulder, unspecified osteoarthritis type  -     XR SHOULDER RT AP/LAT MIN 2 V; Future  -     [place] ARTHROCENTESIS ASPIR&/INJ MAJOR JT/BURSA W/O US    Return in about 3 months (around 6/17/2023). In discussion with the patient, we considered the numerus possible diagnoses that could be contributing to their present symptoms. We also deliberated on the extensive management options that must be considered to treat their current condition. We reviewed their accessible prior medical records, diagnostic tests, and current health and employment information. We considered how these symptoms were affecting the patient´s activities of daily living as well as employment and fitness activities. The patient had various questions regarding the possible risks, benefits, complications, morbidity and mortality regarding their diagnosis and treatment options. The patients´ comorbidities were considered, and I advocated that they consider maximizing lifestyle modification through nutrition and exercise to aid in addressing their symptoms. Shared decision making yielded an understanding to move forward with conservation treatment preferences. The patient expressed understanding that if conservative management fails to alleviate the present symptoms they will return to office for re-evaluation and consideration of additional diagnostic tests and potential surgical options. In the interim, we have recommended ice, elevation, and take prescription anti-inflammatory medications along with a physician directed home exercise program. We discussed the risks and common side effects of anti-inflammatory medications and instructed the patient to discontinue the medication and contact us if they experienced any side effects.  The patient was encouraged to discuss the possible side effects with their family physician or pharmacist prior to initiating any new medications. We discussed the fact that many of the recommended treatment options presented are significantly limited by the patient´s social determinants of health. We also reviewed the circumstances surrounding the environment that they live and work which affect a wide range of health risk. We considered the limited access to appropriate educational resources regarding proper nutrition and exercise as well as the economic and social support necessary to maintain health and wellbeing. We discussed the possibility of an injection of a steroid mixed with a local anesthetic to relieve pain and decrease inflammation in the right shoulder. The risks of a steroid injection which include but are not limited to cartilage damage, death of nearby bone, joint infection, nerve damage, temporary facial flushing, temporary steroid flare of pain and inflammation in the joint, temporary increase in blood sugar, tendon weakening or rupture, thinning of nearby bone (osteoporosis), thinning of skin and soft tissue around the injection site, and whitening or lightening of the skin around the injection site were reviewed at length. We specifically advised that patients with diabetes talk to their primary care to ensure they are safe for a steroid injection due to the transient increase in blood sugar associated with the injection. We discussed the chance of increased bleeding and bruising if the patient is on blood thinners or certain dietary supplements that have a blood-thinning effect. We advised patients that have an active infection, history of allergic reactions to steroids or take medications that may prohibit them from receiving a steroid injection to talk to their primary care physician before receiving and injection.  We also talked about limiting the number of injections because these potential side effects increase with larger doses and repeated use. After explaining the risks and benefits of the procedure and obtaining verbal informed consent from the patient, the proposed area for injection was confirmed with the patient. After all questions and concerns were addressed, the skin was prepped with alcohol to reduce the chances of infection. The skin was anesthetized with topical ethylene chloride spray and a mixture of one milliliter of Depo-Medrol (80mg/ml), and one milliliter of Marcaine was injected slowly into the right shoulder in a sterile fashion without difficulty. The needle was removed and disposed of in a sterile container. The patient tolerated the injection well and a band-aid was placed on the skin. The patient was counseled on protecting the injection area, avoiding water submersion for 2 days, icing for pain relief and looking for signs and symptoms of infection. We requested that the patient contact us if any symptoms persist greater than 48 hours after the injection. In addition to discussing the patient's orthopaedic condition, we provided the pre-service work, the injection and the post-service work of the procedure (). We talked about the fact that she had severe arthritis in the right shoulder. We talked about operative and nonoperative treatment. We will see how she responds to the injection. SUBJECTIVE/OBJECTIVE:  Armin Baugh (: 1945) is a 78 y.o. female, patient,here for evaluation of the Shoulder Pain (right)  . Patient returns today for follow-up of right shoulder. She reports the pains become worse. She reports she is starting to have difficulty sleeping. She reports she is taking CBD Gummies. She denies any numbness or tingling or neck pain. She reports she is feels stiff. PHYSICAL EXAM:    Upon physical examination, the patient is well developed, well nourished, alert and oriented times three, with normal mood and affect and walks with a normal gait.     Upon examination of the right shoulder, the patient sits with the scapula protracted and depressed. They are tender to palpation over the trapezius and rhomboids as well as the anterior aspect of the supraspinatus and biceps. They are non-tender to palpation over the neck, clavicle, scapula, and elbow. There is no soft tissue swelling and eccymosis. The patient has limited range of motion of the shoulder in all planes secondary to discomfort. The patient is unable to tolerate stability testing. They have 5/5 strength at their side, and are neurovascularly intact distally. There is no erythema, warmth or skin lesions present. On examination of the contralateral extremity, the patient is nontender to palpation and has excellent range of motion, stability and strength. IMAGING:    I have independently reviewed and interpreted the following images:     3 views of the right shoulder show severe glenohumeral arthritis. Allergies   Allergen Reactions    Pcn [Penicillins] Itching     Lips itching    Femara [Letrozole] Other (comments)     htn       Current Outpatient Medications   Medication Sig    atorvastatin (LIPITOR) 40 mg tablet TAKE 1 TABLET BY MOUTH  DAILY    budesonide (ENTOCORT EC) 3 mg capsule     OTHER CBD oil    metFORMIN (GLUCOPHAGE) 500 mg tablet TAKE 1 TABLET BY MOUTH  TWICE DAILY WITH MEALS    albuterol (PROVENTIL HFA, VENTOLIN HFA, PROAIR HFA) 90 mcg/actuation inhaler Take 2 Puffs by inhalation every six (6) hours as needed for Wheezing. calcium-cholecalciferol, D3, (CALTRATE 600+D) tablet Take 1 Tab by mouth daily.      Current Facility-Administered Medications   Medication    BUPivacaine (PF) (MARCAINE) 0.75 % (7.5 mg/mL) injection 15 mg    methylPREDNISolone acetate (DEPO-MEDROL) 80 mg/mL injection 80 mg       Past Medical History:   Diagnosis Date    Breast cancer ([facility] [address] 2003    lumpectomy + radiation right breast    Diabetes mellitus, controlled ([facility] [address] 9/3/2015    Encounter for screening mammogram for breast cancer 11/20/2018    Dr. Benton Nurse - normal - repeat in 1 year     Genital herpes 3/10/2010    GERD (gastroesophageal reflux disease)     HTN, goal below 140/90 3/8/2010    Hyperlipemia 11/26/2014    Osteopenia 03/10/2010    Reclast    Screening mammogram, encounter for 09/17/2018    Dr. Benton Nurse - possible mass on right breast     Screening mammogram, encounter for 12/24/2019    normal - repeat in 1 year       Past Surgical History:   Procedure Laterality Date    ENDOSCOPY, COLON, DIAGNOSTIC  2000    HX APPENDECTOMY      HX BREAST LUMPECTOMY      R breast - lumpectomy + radiation - on arimidex x 5 years - Dr. Florin Sinclair      HX LITHOTRIPSY  2006    kidney stone    HX ORTHOPAEDIC  [id number]    bilat hips replaced - needs keflex prior to dental procedure    HX ORTHOPAEDIC      bilateral foot fusion of great hallux. hammer toe    HX TONSILLECTOMY      AR UNLISTED PROCEDURE FOOT/TOES      AR UNLISTED PROCEDURE SPINE         Family History   Problem Relation Age of Onset    OSTEOARTHRITIS Mother     Stroke Father     OSTEOARTHRITIS Maternal Grandmother        Social History     Socioeconomic History    Marital status:      Spouse name: Not on file    Number of children: Not on file    Years of education: Not on file    Highest education level: Not on file   Occupational History    Not on file   Tobacco Use    Smoking status: Never    Smokeless tobacco: Never   Vaping Use    Vaping Use: Never used   Substance and Sexual Activity    Alcohol use: Yes     Comment: red wine 7/wk    Drug use: Yes     Types: Marijuana    Sexual activity: Not Currently   Other Topics Concern     Service No    Blood Transfusions No    Caffeine Concern No    Occupational Exposure No    Hobby Hazards No    Sleep Concern No    Stress Concern No    Weight Concern Yes     Comment: weight gain     Special Diet No    Back Care No    Exercise Yes     Comment: walks up stairs     Bike Helmet No    Seat Belt Yes    Self-Exams Yes Social History Narrative    Not on file     Social Determinants of Health     Financial Resource Strain: Not on file   Food Insecurity: Not on file   Transportation Needs: Not on file   Physical Activity: Not on file   Stress: Not on file   Social Connections: Not on file   Intimate Partner Violence: Not on file   Housing Stability: Not on file       Review of Systems    No flowsheet data found. Vitals:  Ht 5' 4\" (1.626 m)   Wt 157 lb (71.2 kg)   BMI 26.95 kg/m²    Body mass index is 26.95 kg/m². An electronic signature was used to authenticate this note.   -- Carlo Osler, MD

## 2023-03-17 ENCOUNTER — OFFICE VISIT (OUTPATIENT)
Dept: ORTHOPEDIC SURGERY | Age: 78
End: 2023-03-17

## 2023-03-17 VITALS — HEIGHT: 64 IN | WEIGHT: 157 LBS | BODY MASS INDEX: 26.8 KG/M2

## 2023-03-17 DIAGNOSIS — M19.011 OSTEOARTHRITIS OF RIGHT SHOULDER, UNSPECIFIED OSTEOARTHRITIS TYPE: Primary | ICD-10-CM

## 2023-03-17 RX ORDER — METHYLPREDNISOLONE ACETATE 80 MG/ML
80 INJECTION, SUSPENSION INTRA-ARTICULAR; INTRALESIONAL; INTRAMUSCULAR; SOFT TISSUE ONCE
Status: COMPLETED | OUTPATIENT
Start: 2023-03-17 | End: 2023-03-17

## 2023-03-17 RX ORDER — BUPIVACAINE HYDROCHLORIDE 7.5 MG/ML
2 INJECTION, SOLUTION EPIDURAL; RETROBULBAR ONCE
Status: COMPLETED | OUTPATIENT
Start: 2023-03-17 | End: 2023-03-17

## 2023-03-17 RX ADMIN — BUPIVACAINE HYDROCHLORIDE 15 MG: 7.5 INJECTION, SOLUTION EPIDURAL; RETROBULBAR at 08:23

## 2023-03-17 RX ADMIN — METHYLPREDNISOLONE ACETATE 80 MG: 80 INJECTION, SUSPENSION INTRA-ARTICULAR; INTRALESIONAL; INTRAMUSCULAR; SOFT TISSUE at 08:23

## 2023-03-21 ENCOUNTER — OFFICE VISIT (OUTPATIENT)
Dept: ORTHOPEDIC SURGERY | Age: 78
End: 2023-03-21
Payer: MEDICARE

## 2023-03-21 VITALS — HEIGHT: 64 IN | BODY MASS INDEX: 26.8 KG/M2 | WEIGHT: 157 LBS

## 2023-03-21 DIAGNOSIS — M19.012 OSTEOARTHRITIS OF LEFT SHOULDER, UNSPECIFIED OSTEOARTHRITIS TYPE: Primary | ICD-10-CM

## 2023-03-21 PROCEDURE — 99214 OFFICE O/P EST MOD 30 MIN: CPT | Performed by: ORTHOPAEDIC SURGERY

## 2023-03-21 PROCEDURE — G8536 NO DOC ELDER MAL SCRN: HCPCS | Performed by: ORTHOPAEDIC SURGERY

## 2023-03-21 PROCEDURE — G8432 DEP SCR NOT DOC, RNG: HCPCS | Performed by: ORTHOPAEDIC SURGERY

## 2023-03-21 PROCEDURE — G8428 CUR MEDS NOT DOCUMENT: HCPCS | Performed by: ORTHOPAEDIC SURGERY

## 2023-03-21 PROCEDURE — 1123F ACP DISCUSS/DSCN MKR DOCD: CPT | Performed by: ORTHOPAEDIC SURGERY

## 2023-03-21 PROCEDURE — 20610 DRAIN/INJ JOINT/BURSA W/O US: CPT | Performed by: ORTHOPAEDIC SURGERY

## 2023-03-21 PROCEDURE — 1101F PT FALLS ASSESS-DOCD LE1/YR: CPT | Performed by: ORTHOPAEDIC SURGERY

## 2023-03-21 PROCEDURE — 3017F COLORECTAL CA SCREEN DOC REV: CPT | Performed by: ORTHOPAEDIC SURGERY

## 2023-03-21 PROCEDURE — 1090F PRES/ABSN URINE INCON ASSESS: CPT | Performed by: ORTHOPAEDIC SURGERY

## 2023-03-21 PROCEDURE — G8417 CALC BMI ABV UP PARAM F/U: HCPCS | Performed by: ORTHOPAEDIC SURGERY

## 2023-03-21 RX ORDER — BUPIVACAINE HYDROCHLORIDE 7.5 MG/ML
2 INJECTION, SOLUTION EPIDURAL; RETROBULBAR ONCE
Status: COMPLETED | OUTPATIENT
Start: 2023-03-21 | End: 2023-03-21

## 2023-03-21 RX ORDER — METHYLPREDNISOLONE ACETATE 80 MG/ML
80 INJECTION, SUSPENSION INTRA-ARTICULAR; INTRALESIONAL; INTRAMUSCULAR; SOFT TISSUE ONCE
Status: COMPLETED | OUTPATIENT
Start: 2023-03-21 | End: 2023-03-21

## 2023-03-21 RX ADMIN — METHYLPREDNISOLONE ACETATE 80 MG: 80 INJECTION, SUSPENSION INTRA-ARTICULAR; INTRALESIONAL; INTRAMUSCULAR; SOFT TISSUE at 10:57

## 2023-03-21 RX ADMIN — BUPIVACAINE HYDROCHLORIDE 15 MG: 7.5 INJECTION, SOLUTION EPIDURAL; RETROBULBAR at 10:57

## 2023-03-21 NOTE — PROGRESS NOTES
ASSESSMENT/PLAN:  Below is the assessment and plan developed based on review of pertinent history, physical exam, labs, studies, and medications. 1. Osteoarthritis of left shoulder, unspecified osteoarthritis type  -     [place] ARTHROCENTESIS ASPIR&/INJ MAJOR JT/BURSA W/O US    Return in about 3 months (around 6/21/2023). In discussion with the patient, we considered the numerus possible diagnoses that could be contributing to their present symptoms. We also deliberated on the extensive management options that must be considered to treat their current condition. We reviewed their accessible prior medical records, diagnostic tests, and current health and employment information. We considered how these symptoms were affecting the patient´s activities of daily living as well as employment and fitness activities. The patient had various questions regarding the possible risks, benefits, complications, morbidity and mortality regarding their diagnosis and treatment options. The patients´ comorbidities were considered, and I advocated that they consider maximizing lifestyle modification through nutrition and exercise to aid in addressing their symptoms. Shared decision making yielded an understanding to move forward with conservation treatment preferences. The patient expressed understanding that if conservative management fails to alleviate the present symptoms they will return to office for re-evaluation and consideration of additional diagnostic tests and potential surgical options. In the interim, we have recommended ice, elevation, and take prescription anti-inflammatory medications along with a physician directed home exercise program. We discussed the risks and common side effects of anti-inflammatory medications and instructed the patient to discontinue the medication and contact us if they experienced any side effects.  The patient was encouraged to discuss the possible side effects with their family physician or pharmacist prior to initiating any new medications. We discussed the fact that many of the recommended treatment options presented are significantly limited by the patient´s social determinants of health. We also reviewed the circumstances surrounding the environment that they live and work which affect a wide range of health risk. We considered the limited access to appropriate educational resources regarding proper nutrition and exercise as well as the economic and social support necessary to maintain health and wellbeing. We discussed the possibility of an injection of a steroid mixed with a local anesthetic to relieve pain and decrease inflammation in the left shoulder. The risks of a steroid injection which include but are not limited to cartilage damage, death of nearby bone, joint infection, nerve damage, temporary facial flushing, temporary steroid flare of pain and inflammation in the joint, temporary increase in blood sugar, tendon weakening or rupture, thinning of nearby bone (osteoporosis), thinning of skin and soft tissue around the injection site, and whitening or lightening of the skin around the injection site were reviewed at length. We specifically advised that patients with diabetes talk to their primary care to ensure they are safe for a steroid injection due to the transient increase in blood sugar associated with the injection. We discussed the chance of increased bleeding and bruising if the patient is on blood thinners or certain dietary supplements that have a blood-thinning effect. We advised patients that have an active infection, history of allergic reactions to steroids or take medications that may prohibit them from receiving a steroid injection to talk to their primary care physician before receiving and injection. We also talked about limiting the number of injections because these potential side effects increase with larger doses and repeated use.     After explaining the risks and benefits of the procedure and obtaining verbal informed consent from the patient, the proposed area for injection was confirmed with the patient. After all questions and concerns were addressed, the skin was prepped with alcohol to reduce the chances of infection. The skin was anesthetized with topical ethylene chloride spray and a mixture of one milliliter of Depo-Medrol (80mg/ml), and one milliliter of Marcaine was injected slowly into the left shoulder in a sterile fashion without difficulty. The needle was removed and disposed of in a sterile container. The patient tolerated the injection well and a band-aid was placed on the skin. The patient was counseled on protecting the injection area, avoiding water submersion for 2 days, icing for pain relief and looking for signs and symptoms of infection. We requested that the patient contact us if any symptoms persist greater than 48 hours after the injection. In addition to discussing the patient's orthopaedic condition, we provided the pre-service work, the injection and the post-service work of the procedure (). We talked about the fact that she had severe arthritis in the right shoulder. We talked about operative and nonoperative treatment. We will see how she responds to the injection. SUBJECTIVE/OBJECTIVE:  Sepideh Gonzalez (: 1945) is a 78 y.o. female, patient,here for evaluation of the Shoulder Pain (left)  . Patient follows up for the left shoulder. She reports the pains become worse. She reports she has increased discomfort particularly at night. She reports she is losing range of motion. She reports rest does make it better but activity makes it worse. She reports she has been taking some CBD Gummies at night to help her sleep. She denies any neck pain or numbness or tingling. She reports her shoulder feels stiff.     PHYSICAL EXAM:    Upon physical examination, the patient is well developed, well nourished, alert and oriented times three, with normal mood and affect and walks with a normal gait. Upon examination of the left shoulder, the patient sits with the scapula protracted and depressed. They are tender to palpation over the trapezius and rhomboids as well as the anterior aspect of the supraspinatus and biceps. They are non-tender to palpation over the neck, clavicle, scapula, and elbow. There is no soft tissue swelling and eccymosis. The patient has limited range of motion of the shoulder in all planes secondary to discomfort. The patient is unable to tolerate stability testing. They have 5/5 strength at their side, and are neurovascularly intact distally. There is no erythema, warmth or skin lesions present. On examination of the contralateral extremity, the patient is nontender to palpation and has excellent range of motion, stability and strength. IMAGING:    I have independently reviewed and interpreted the following images:     3 views of the left shoulder demonstrate glenohumeral arthritis. Allergies   Allergen Reactions    Pcn [Penicillins] Itching     Lips itching    Femara [Letrozole] Other (comments)     htn       Current Outpatient Medications   Medication Sig    atorvastatin (LIPITOR) 40 mg tablet TAKE 1 TABLET BY MOUTH  DAILY    budesonide (ENTOCORT EC) 3 mg capsule     OTHER CBD oil    metFORMIN (GLUCOPHAGE) 500 mg tablet TAKE 1 TABLET BY MOUTH  TWICE DAILY WITH MEALS    albuterol (PROVENTIL HFA, VENTOLIN HFA, PROAIR HFA) 90 mcg/actuation inhaler Take 2 Puffs by inhalation every six (6) hours as needed for Wheezing. calcium-cholecalciferol, D3, (CALTRATE 600+D) tablet Take 1 Tab by mouth daily.      Current Facility-Administered Medications   Medication    BUPivacaine (PF) (MARCAINE) 0.75 % (7.5 mg/mL) injection 15 mg    methylPREDNISolone acetate (DEPO-MEDROL) 80 mg/mL injection 80 mg       Past Medical History:   Diagnosis Date    Breast cancer ([facility] [address] 2003    lumpectomy + radiation right breast    Diabetes mellitus, controlled ([facility] [address] 9/3/2015    Encounter for screening mammogram for breast cancer 11/20/2018    Dr. Triston Arceo - normal - repeat in 1 year     Genital herpes 3/10/2010    GERD (gastroesophageal reflux disease)     HTN, goal below 140/90 3/8/2010    Hyperlipemia 11/26/2014    Osteopenia 03/10/2010    Reclast    Screening mammogram, encounter for 09/17/2018    Dr. Triston Arceo - possible mass on right breast     Screening mammogram, encounter for 12/24/2019    normal - repeat in 1 year       Past Surgical History:   Procedure Laterality Date    ENDOSCOPY, COLON, DIAGNOSTIC  2000    HX APPENDECTOMY      HX BREAST LUMPECTOMY      R breast - lumpectomy + radiation - on arimidex x 5 years - Dr. Torrey Cardenas      HX LITHOTRIPSY  2006    kidney stone    HX ORTHOPAEDIC  [id number]    bilat hips replaced - needs keflex prior to dental procedure    HX ORTHOPAEDIC      bilateral foot fusion of great hallux. hammer toe    HX TONSILLECTOMY      [place] UNLISTED PROCEDURE FOOT/TOES      [place] UNLISTED PROCEDURE SPINE         Family History   Problem Relation Age of Onset    OSTEOARTHRITIS Mother     Stroke Father     OSTEOARTHRITIS Maternal Grandmother        Social History     Socioeconomic History    Marital status:      Spouse name: Not on file    Number of children: Not on file    Years of education: Not on file    Highest education level: Not on file   Occupational History    Not on file   Tobacco Use    Smoking status: Never    Smokeless tobacco: Never   Vaping Use    Vaping Use: Never used   Substance and Sexual Activity    Alcohol use: Yes     Comment: red wine 7/wk    Drug use: Yes     Types: Marijuana    Sexual activity: Not Currently   Other Topics Concern     Service No    Blood Transfusions No    Caffeine Concern No    Occupational Exposure No    Hobby Hazards No    Sleep Concern No    Stress Concern No    Weight Concern Yes     Comment: weight gain     Special Diet No Back Care No    Exercise Yes     Comment: walks up stairs     Bike Helmet No    Seat Belt Yes    Self-Exams Yes   Social History Narrative    Not on file     Social Determinants of Health     Financial Resource Strain: Not on file   Food Insecurity: Not on file   Transportation Needs: Not on file   Physical Activity: Not on file   Stress: Not on file   Social Connections: Not on file   Intimate Partner Violence: Not on file   Housing Stability: Not on file       Review of Systems    No flowsheet data found. Vitals:  Ht 5' 4\" (1.626 m)   Wt 157 lb (71.2 kg)   BMI 26.95 kg/m²    Body mass index is 26.95 kg/m². An electronic signature was used to authenticate this note.   -- Samaria Larsen MD

## 2023-05-18 RX ORDER — OLMESARTAN MEDOXOMIL 40 MG/1
40 TABLET ORAL DAILY
Qty: 90 TABLET | Refills: 1 | Status: SHIPPED | OUTPATIENT
Start: 2023-05-18

## 2023-07-19 DIAGNOSIS — E55.9 VITAMIN D DEFICIENCY: ICD-10-CM

## 2023-07-19 DIAGNOSIS — E11.9 TYPE 2 DIABETES MELLITUS WITHOUT COMPLICATION, WITHOUT LONG-TERM CURRENT USE OF INSULIN (HCC): Primary | ICD-10-CM

## 2023-07-19 DIAGNOSIS — E78.5 HYPERLIPIDEMIA, UNSPECIFIED HYPERLIPIDEMIA TYPE: ICD-10-CM

## 2023-07-19 NOTE — TELEPHONE ENCOUNTER
PCP: Sydnee Leonardo MD    Last appt: [unfilled]  Future Appointments   Date Time Provider [address]   7/25/2023  2:15 PM Lisa Leach MD [facility] AMB       Requested Prescriptions     Pending Prescriptions Disp Refills    atorvastatin (LIPITOR) 40 MG tablet [Pharmacy Med Name: Atorvastatin Calcium 40 MG Oral Tablet] 90 tablet 3     Sig: TAKE 1 TABLET BY MOUTH DAILY

## 2023-07-19 NOTE — TELEPHONE ENCOUNTER
----- Message from Karlene Morris MA sent at 7/19/2023  8:21 AM EDT -----  Subject: Message to Provider    QUESTIONS  Information for Provider? Has appt 7/25/23. Requesting bloodwork orders   prior to visit. Please call when ready. Would like tomorrow am.   ---------------------------------------------------------------------------  --------------  Judith Winchester [facility]  [phone]; OK to leave message on voicemail  ---------------------------------------------------------------------------  --------------  SCRIPT ANSWERS  Relationship to Patient?  Self

## 2023-07-20 RX ORDER — ATORVASTATIN CALCIUM 40 MG/1
TABLET, FILM COATED ORAL DAILY
Qty: 90 TABLET | Refills: 0 | Status: SHIPPED | OUTPATIENT
Start: 2023-07-20

## 2023-07-21 ENCOUNTER — NURSE ONLY (OUTPATIENT)
Age: 78
End: 2023-07-21

## 2023-07-21 DIAGNOSIS — E55.9 VITAMIN D DEFICIENCY: ICD-10-CM

## 2023-07-21 DIAGNOSIS — E78.5 HYPERLIPIDEMIA, UNSPECIFIED HYPERLIPIDEMIA TYPE: ICD-10-CM

## 2023-07-21 DIAGNOSIS — E11.9 TYPE 2 DIABETES MELLITUS WITHOUT COMPLICATION, WITHOUT LONG-TERM CURRENT USE OF INSULIN (HCC): ICD-10-CM

## 2023-07-21 SDOH — HEALTH STABILITY: PHYSICAL HEALTH: ON AVERAGE, HOW MANY DAYS PER WEEK DO YOU ENGAGE IN MODERATE TO STRENUOUS EXERCISE (LIKE A BRISK WALK)?: 0 DAYS

## 2023-07-21 SDOH — HEALTH STABILITY: PHYSICAL HEALTH: ON AVERAGE, HOW MANY MINUTES DO YOU ENGAGE IN EXERCISE AT THIS LEVEL?: 10 MIN

## 2023-07-21 ASSESSMENT — LIFESTYLE VARIABLES
HOW OFTEN DURING THE LAST YEAR HAVE YOU BEEN UNABLE TO REMEMBER WHAT HAPPENED THE NIGHT BEFORE BECAUSE YOU HAD BEEN DRINKING: 0
HOW OFTEN DURING THE LAST YEAR HAVE YOU BEEN UNABLE TO REMEMBER WHAT HAPPENED THE NIGHT BEFORE BECAUSE YOU HAD BEEN DRINKING: NEVER
HOW OFTEN DURING THE LAST YEAR HAVE YOU FOUND THAT YOU WERE NOT ABLE TO STOP DRINKING ONCE YOU HAD STARTED: NEVER
HOW OFTEN DO YOU HAVE SIX OR MORE DRINKS ON ONE OCCASION: 1
HOW OFTEN DURING THE LAST YEAR HAVE YOU FOUND THAT YOU WERE NOT ABLE TO STOP DRINKING ONCE YOU HAD STARTED: 0
HAS A RELATIVE, FRIEND, DOCTOR, OR ANOTHER HEALTH PROFESSIONAL EXPRESSED CONCERN ABOUT YOUR DRINKING OR SUGGESTED YOU CUT DOWN: 0
HAS A RELATIVE, FRIEND, DOCTOR, OR ANOTHER HEALTH PROFESSIONAL EXPRESSED CONCERN ABOUT YOUR DRINKING OR SUGGESTED YOU CUT DOWN: NO
HAVE YOU OR SOMEONE ELSE BEEN INJURED AS A RESULT OF YOUR DRINKING: NO
HOW OFTEN DURING THE LAST YEAR HAVE YOU NEEDED AN ALCOHOLIC DRINK FIRST THING IN THE MORNING TO GET YOURSELF GOING AFTER A NIGHT OF HEAVY DRINKING: NEVER
HOW MANY STANDARD DRINKS CONTAINING ALCOHOL DO YOU HAVE ON A TYPICAL DAY: 1 OR 2
HOW OFTEN DURING THE LAST YEAR HAVE YOU FAILED TO DO WHAT WAS NORMALLY EXPECTED FROM YOU BECAUSE OF DRINKING: 0
HOW MANY STANDARD DRINKS CONTAINING ALCOHOL DO YOU HAVE ON A TYPICAL DAY: 1
HOW OFTEN DURING THE LAST YEAR HAVE YOU HAD A FEELING OF GUILT OR REMORSE AFTER DRINKING: 0
HOW OFTEN DO YOU HAVE A DRINK CONTAINING ALCOHOL: 5
HOW OFTEN DURING THE LAST YEAR HAVE YOU FAILED TO DO WHAT WAS NORMALLY EXPECTED FROM YOU BECAUSE OF DRINKING: NEVER
HOW OFTEN DO YOU HAVE A DRINK CONTAINING ALCOHOL: 4 OR MORE TIMES A WEEK
HOW OFTEN DURING THE LAST YEAR HAVE YOU HAD A FEELING OF GUILT OR REMORSE AFTER DRINKING: NEVER
HOW OFTEN DURING THE LAST YEAR HAVE YOU NEEDED AN ALCOHOLIC DRINK FIRST THING IN THE MORNING TO GET YOURSELF GOING AFTER A NIGHT OF HEAVY DRINKING: 0
HAVE YOU OR SOMEONE ELSE BEEN INJURED AS A RESULT OF YOUR DRINKING: 0

## 2023-07-21 ASSESSMENT — PATIENT HEALTH QUESTIONNAIRE - PHQ9
SUM OF ALL RESPONSES TO PHQ QUESTIONS 1-9: 0
SUM OF ALL RESPONSES TO PHQ QUESTIONS 1-9: 0
2. FEELING DOWN, DEPRESSED OR HOPELESS: 0
1. LITTLE INTEREST OR PLEASURE IN DOING THINGS: 0
SUM OF ALL RESPONSES TO PHQ QUESTIONS 1-9: 0
SUM OF ALL RESPONSES TO PHQ QUESTIONS 1-9: 0
SUM OF ALL RESPONSES TO PHQ9 QUESTIONS 1 & 2: 0

## 2023-07-22 LAB
25(OH)D3 SERPL-MCNC: 49.6 NG/ML (ref 30–100)
ALBUMIN SERPL-MCNC: 3.8 G/DL (ref 3.5–5)
ALBUMIN/GLOB SERPL: 1.3 (ref 1.1–2.2)
ALP SERPL-CCNC: 86 U/L (ref 45–117)
ALT SERPL-CCNC: 11 U/L (ref 12–78)
ANION GAP SERPL CALC-SCNC: 3 MMOL/L (ref 5–15)
AST SERPL-CCNC: 15 U/L (ref 15–37)
BILIRUB SERPL-MCNC: 0.5 MG/DL (ref 0.2–1)
BUN SERPL-MCNC: 14 MG/DL (ref 6–20)
BUN/CREAT SERPL: 21 (ref 12–20)
CALCIUM SERPL-MCNC: 9.1 MG/DL (ref 8.5–10.1)
CHLORIDE SERPL-SCNC: 103 MMOL/L (ref 97–108)
CHOLEST SERPL-MCNC: 149 MG/DL
CO2 SERPL-SCNC: 27 MMOL/L (ref 21–32)
CREAT SERPL-MCNC: 0.66 MG/DL (ref 0.55–1.02)
CREAT UR-MCNC: 91.2 MG/DL
ERYTHROCYTE [DISTWIDTH] IN BLOOD BY AUTOMATED COUNT: 12.8 % (ref 11.5–14.5)
EST. AVERAGE GLUCOSE BLD GHB EST-MCNC: 114 MG/DL
GLOBULIN SER CALC-MCNC: 2.9 G/DL (ref 2–4)
GLUCOSE SERPL-MCNC: 106 MG/DL (ref 65–100)
HBA1C MFR BLD: 5.6 % (ref 4–5.6)
HCT VFR BLD AUTO: 42.9 % (ref 35–47)
HDLC SERPL-MCNC: 69 MG/DL
HDLC SERPL: 2.2 (ref 0–5)
HGB BLD-MCNC: 13.8 G/DL (ref 11.5–16)
LDLC SERPL CALC-MCNC: 61.6 MG/DL (ref 0–100)
MCH RBC QN AUTO: 30.9 PG (ref 26–34)
MCHC RBC AUTO-ENTMCNC: 32.2 G/DL (ref 30–36.5)
MCV RBC AUTO: 96 FL (ref 80–99)
MICROALBUMIN UR-MCNC: 0.94 MG/DL
MICROALBUMIN/CREAT UR-RTO: 10 MG/G (ref 0–30)
NRBC # BLD: 0 K/UL (ref 0–0.01)
NRBC BLD-RTO: 0 PER 100 WBC
PLATELET # BLD AUTO: 257 K/UL (ref 150–400)
PMV BLD AUTO: 11.1 FL (ref 8.9–12.9)
POTASSIUM SERPL-SCNC: 4.6 MMOL/L (ref 3.5–5.1)
PROT SERPL-MCNC: 6.7 G/DL (ref 6.4–8.2)
RBC # BLD AUTO: 4.47 M/UL (ref 3.8–5.2)
SODIUM SERPL-SCNC: 133 MMOL/L (ref 136–145)
TRIGL SERPL-MCNC: 92 MG/DL
VLDLC SERPL CALC-MCNC: 18.4 MG/DL
WBC # BLD AUTO: 6.6 K/UL (ref 3.6–11)

## 2023-07-22 SDOH — ECONOMIC STABILITY: FOOD INSECURITY: WITHIN THE PAST 12 MONTHS, YOU WORRIED THAT YOUR FOOD WOULD RUN OUT BEFORE YOU GOT MONEY TO BUY MORE.: NEVER TRUE

## 2023-07-22 SDOH — ECONOMIC STABILITY: TRANSPORTATION INSECURITY
IN THE PAST 12 MONTHS, HAS LACK OF TRANSPORTATION KEPT YOU FROM MEETINGS, WORK, OR FROM GETTING THINGS NEEDED FOR DAILY LIVING?: NO

## 2023-07-22 SDOH — ECONOMIC STABILITY: HOUSING INSECURITY
IN THE LAST 12 MONTHS, WAS THERE A TIME WHEN YOU DID NOT HAVE A STEADY PLACE TO SLEEP OR SLEPT IN A SHELTER (INCLUDING NOW)?: NO

## 2023-07-22 SDOH — ECONOMIC STABILITY: FOOD INSECURITY: WITHIN THE PAST 12 MONTHS, THE FOOD YOU BOUGHT JUST DIDN'T LAST AND YOU DIDN'T HAVE MONEY TO GET MORE.: NEVER TRUE

## 2023-07-22 SDOH — ECONOMIC STABILITY: INCOME INSECURITY: HOW HARD IS IT FOR YOU TO PAY FOR THE VERY BASICS LIKE FOOD, HOUSING, MEDICAL CARE, AND HEATING?: NOT HARD AT ALL

## 2023-07-25 ENCOUNTER — OFFICE VISIT (OUTPATIENT)
Age: 78
End: 2023-07-25
Payer: MEDICARE

## 2023-07-25 VITALS
OXYGEN SATURATION: 98 % | TEMPERATURE: 96.9 F | DIASTOLIC BLOOD PRESSURE: 82 MMHG | HEIGHT: 64 IN | RESPIRATION RATE: 14 BRPM | WEIGHT: 152.4 LBS | HEART RATE: 83 BPM | SYSTOLIC BLOOD PRESSURE: 130 MMHG | BODY MASS INDEX: 26.02 KG/M2

## 2023-07-25 DIAGNOSIS — E11.9 CONTROLLED TYPE 2 DIABETES MELLITUS WITHOUT COMPLICATION, WITHOUT LONG-TERM CURRENT USE OF INSULIN (HCC): ICD-10-CM

## 2023-07-25 DIAGNOSIS — Z00.00 ENCOUNTER FOR MEDICARE ANNUAL WELLNESS EXAM: Primary | ICD-10-CM

## 2023-07-25 DIAGNOSIS — C50.411 MALIGNANT NEOPLASM OF UPPER-OUTER QUADRANT OF RIGHT FEMALE BREAST, UNSPECIFIED ESTROGEN RECEPTOR STATUS (HCC): ICD-10-CM

## 2023-07-25 DIAGNOSIS — E78.5 HYPERLIPIDEMIA, UNSPECIFIED HYPERLIPIDEMIA TYPE: ICD-10-CM

## 2023-07-25 DIAGNOSIS — Z78.0 POSTMENOPAUSAL: ICD-10-CM

## 2023-07-25 DIAGNOSIS — Z13.820 SCREENING FOR OSTEOPOROSIS: ICD-10-CM

## 2023-07-25 PROCEDURE — G8427 DOCREV CUR MEDS BY ELIG CLIN: HCPCS | Performed by: FAMILY MEDICINE

## 2023-07-25 PROCEDURE — 1123F ACP DISCUSS/DSCN MKR DOCD: CPT | Performed by: FAMILY MEDICINE

## 2023-07-25 PROCEDURE — G8419 CALC BMI OUT NRM PARAM NOF/U: HCPCS | Performed by: FAMILY MEDICINE

## 2023-07-25 PROCEDURE — 2022F DILAT RTA XM EVC RTNOPTHY: CPT | Performed by: FAMILY MEDICINE

## 2023-07-25 PROCEDURE — 99213 OFFICE O/P EST LOW 20 MIN: CPT | Performed by: FAMILY MEDICINE

## 2023-07-25 PROCEDURE — 1090F PRES/ABSN URINE INCON ASSESS: CPT | Performed by: FAMILY MEDICINE

## 2023-07-25 PROCEDURE — 3075F SYST BP GE 130 - 139MM HG: CPT | Performed by: FAMILY MEDICINE

## 2023-07-25 PROCEDURE — G0439 PPPS, SUBSEQ VISIT: HCPCS | Performed by: FAMILY MEDICINE

## 2023-07-25 PROCEDURE — 3079F DIAST BP 80-89 MM HG: CPT | Performed by: FAMILY MEDICINE

## 2023-07-25 PROCEDURE — 4004F PT TOBACCO SCREEN RCVD TLK: CPT | Performed by: FAMILY MEDICINE

## 2023-07-25 PROCEDURE — G8399 PT W/DXA RESULTS DOCUMENT: HCPCS | Performed by: FAMILY MEDICINE

## 2023-07-25 PROCEDURE — 3017F COLORECTAL CA SCREEN DOC REV: CPT | Performed by: FAMILY MEDICINE

## 2023-07-25 PROCEDURE — 3044F HG A1C LEVEL LT 7.0%: CPT | Performed by: FAMILY MEDICINE

## 2023-07-25 RX ORDER — MESALAMINE 0.38 G/1
CAPSULE, EXTENDED RELEASE ORAL
COMMUNITY
Start: 2023-07-24

## 2023-07-25 ASSESSMENT — ENCOUNTER SYMPTOMS
DIARRHEA: 0
VOMITING: 0
COUGH: 0
SHORTNESS OF BREATH: 0
CHEST TIGHTNESS: 0
NAUSEA: 0
WHEEZING: 0
CONSTIPATION: 0
ABDOMINAL PAIN: 0

## 2023-07-25 NOTE — PROGRESS NOTES
Chief Complaint   Patient presents with    Medicare AWV         1. \"Have you been to the ER, urgent care clinic since your last visit? Hospitalized since your last visit? \" no    2. \"Have you seen or consulted any other health care providers outside of the [address] [place] [address] since your last visit? \" Yes. Dr. Zachery Salcedo -Gastroenterologist.     3. For patients aged 43-73: Has the patient had a colonoscopy / FIT/ Cologuard?  No      PHQ-9  7/21/2023   Little interest or pleasure in doing things 0   Little interest or pleasure in doing things -   Feeling down, depressed, or hopeless 0   PHQ-2 Score 0   Total Score PHQ 2 -   PHQ-9 Total Score 0           Financial Resource Strain: Low Risk     Difficulty of Paying Living Expenses: Not hard at all      Food Insecurity: No Food Insecurity    Worried About Running Out of Food in the Last Year: Never true    Ran Out of Food in the Last Year: Never true          Health Maintenance Due   Topic Date Due    Pneumococcal 65+ years Vaccine (2 - PCV) 09/18/2015    COVID-19 Vaccine (3 - Booster for Ian Henry series) 04/30/2021    Colorectal Cancer Screen  06/05/2022    Diabetic retinal exam  08/13/2022    Diabetic foot exam  12/13/2022    Annual Wellness Visit (AWV)  06/07/2023
Medicare Annual Wellness Visit    Halie Villarreal is here for Medicare AWV    Assessment & Plan   Encounter for Medicare annual wellness exam  Controlled type 2 diabetes mellitus without complication, without long-term current use of insulin ([address])  -     metFORMIN (GLUCOPHAGE) 500 MG tablet; Take 1 tablet by mouth Daily with supper, Disp-90 tablet, R-0NO PRINT  Malignant neoplasm of upper-outer quadrant of right female breast, unspecified estrogen receptor status ([address])  Screening for osteoporosis  -     DEXA BONE DENSITY AXIAL SKELETON; Future  Postmenopausal  -     DEXA BONE DENSITY AXIAL SKELETON; Future  Hyperlipidemia, unspecified hyperlipidemia type     Recommendations for Preventive Services Due: see orders and patient instructions/AVS.  Recommended screening schedule for the next 5-10 years is provided to the patient in written form: see Patient Instructions/AVS.     Return in about 3 months (around 10/25/2023) for DM. Subjective     Patient's complete Health Risk Assessment and screening values have been reviewed and are found in Flowsheets. The following problems were reviewed today and where indicated follow up appointments were made and/or referrals ordered.     Positive Risk Factor Screenings with Interventions:    Fall Risk:  Do you feel unsteady or are you worried about falling? : (!) yes  2 or more falls in past year?: (!) yes  Fall with injury in past year?: no   Interventions:    Patient declines any further evaluation or treatment       Drug Use:    DAST-10 Score: 1     Interpretation:  1-2: Low level - Monitor, re-assess at a later date  3-5: Moderate level - Further Investigation  6-8: Substantial level - Intensive Assessment  9-10: Severe level - Intensive Assessment  Interventions:  Patient declined any further intervention or treatment          Weight and Activity:  Physical Activity: Inactive    Days of Exercise per Week: 0 days    Minutes of Exercise per Session: 10 min     On average, how
Please disregard any errors that have escaped final proofreading. Yonathan Francois M.D.

## 2023-07-27 ENCOUNTER — HOSPITAL ENCOUNTER (OUTPATIENT)
Age: 78
Discharge: HOME | End: 2023-07-27
Payer: MEDICARE

## 2023-07-27 DIAGNOSIS — Z13.820 SCREENING FOR OSTEOPOROSIS: ICD-10-CM

## 2023-07-27 DIAGNOSIS — Z78.0 POSTMENOPAUSAL: ICD-10-CM

## 2023-07-27 PROCEDURE — 77081 DXA BONE DENSITY APPENDICULR: CPT

## 2023-07-31 ENCOUNTER — PATIENT MESSAGE (OUTPATIENT)
Age: 78
End: 2023-07-31

## 2023-07-31 DIAGNOSIS — M81.0 OSTEOPOROSIS, UNSPECIFIED OSTEOPOROSIS TYPE, UNSPECIFIED PATHOLOGICAL FRACTURE PRESENCE: Primary | ICD-10-CM

## 2023-07-31 RX ORDER — FAMOTIDINE 10 MG/ML
20 INJECTION, SOLUTION INTRAVENOUS
OUTPATIENT
Start: 2023-07-31

## 2023-07-31 RX ORDER — ONDANSETRON 2 MG/ML
8 INJECTION INTRAMUSCULAR; INTRAVENOUS
OUTPATIENT
Start: 2023-07-31

## 2023-07-31 RX ORDER — ALBUTEROL SULFATE 90 UG/1
4 AEROSOL, METERED RESPIRATORY (INHALATION) PRN
OUTPATIENT
Start: 2023-07-31

## 2023-07-31 RX ORDER — DIPHENHYDRAMINE HYDROCHLORIDE 50 MG/ML
50 INJECTION INTRAMUSCULAR; INTRAVENOUS
OUTPATIENT
Start: 2023-07-31

## 2023-07-31 RX ORDER — SODIUM CHLORIDE 9 MG/ML
INJECTION, SOLUTION INTRAVENOUS CONTINUOUS
OUTPATIENT
Start: 2023-07-31

## 2023-07-31 RX ORDER — EPINEPHRINE 1 MG/ML
0.3 INJECTION, SOLUTION, CONCENTRATE INTRAVENOUS PRN
OUTPATIENT
Start: 2023-07-31

## 2023-07-31 RX ORDER — ACETAMINOPHEN 325 MG/1
650 TABLET ORAL
OUTPATIENT
Start: 2023-07-31

## 2023-07-31 NOTE — TELEPHONE ENCOUNTER
Jamal ToriSquires, [place] 7/31/2023 1:33 PM EDT      ----- Message -----  From: Evelia Kahn  Sent: 7/31/2023 12:45 PM EDT  To: Jose Perez Clinical Staff  Subject: Prolia     Thanks for your communication about the test results. Considering the backlog at almost every specialty that could be involved, I would like to get the Prolia treatment at [facility] whenever it can be scheduled.  Thanks so much ,  [employer]

## 2023-08-14 ENCOUNTER — HOSPITAL ENCOUNTER (OUTPATIENT)
Facility: HOSPITAL | Age: 78
Setting detail: INFUSION SERIES
End: 2023-08-14
Payer: MEDICARE

## 2023-08-14 VITALS
RESPIRATION RATE: 18 BRPM | HEART RATE: 95 BPM | DIASTOLIC BLOOD PRESSURE: 79 MMHG | TEMPERATURE: 97.6 F | SYSTOLIC BLOOD PRESSURE: 151 MMHG

## 2023-08-14 DIAGNOSIS — M81.0 OSTEOPOROSIS, UNSPECIFIED OSTEOPOROSIS TYPE, UNSPECIFIED PATHOLOGICAL FRACTURE PRESENCE: Primary | ICD-10-CM

## 2023-08-14 LAB — PHOSPHATE SERPL-MCNC: 4.8 MG/DL (ref 2.6–4.7)

## 2023-08-14 PROCEDURE — 6360000002 HC RX W HCPCS: Performed by: FAMILY MEDICINE

## 2023-08-14 PROCEDURE — 84100 ASSAY OF PHOSPHORUS: CPT

## 2023-08-14 PROCEDURE — 36415 COLL VENOUS BLD VENIPUNCTURE: CPT

## 2023-08-14 PROCEDURE — 96372 THER/PROPH/DIAG INJ SC/IM: CPT

## 2023-08-14 RX ORDER — ACETAMINOPHEN 325 MG/1
650 TABLET ORAL
OUTPATIENT
Start: 2024-02-11

## 2023-08-14 RX ORDER — DIPHENHYDRAMINE HYDROCHLORIDE 50 MG/ML
50 INJECTION INTRAMUSCULAR; INTRAVENOUS
OUTPATIENT
Start: 2024-02-11

## 2023-08-14 RX ORDER — EPINEPHRINE 1 MG/ML
0.3 INJECTION, SOLUTION, CONCENTRATE INTRAVENOUS PRN
OUTPATIENT
Start: 2024-02-11

## 2023-08-14 RX ORDER — ALBUTEROL SULFATE 90 UG/1
4 AEROSOL, METERED RESPIRATORY (INHALATION) PRN
OUTPATIENT
Start: 2024-02-11

## 2023-08-14 RX ORDER — SODIUM CHLORIDE 9 MG/ML
INJECTION, SOLUTION INTRAVENOUS CONTINUOUS
OUTPATIENT
Start: 2024-02-11

## 2023-08-14 RX ORDER — ONDANSETRON 2 MG/ML
8 INJECTION INTRAMUSCULAR; INTRAVENOUS
OUTPATIENT
Start: 2024-02-11

## 2023-08-14 RX ADMIN — DENOSUMAB 60 MG: 60 INJECTION SUBCUTANEOUS at 13:14

## 2023-10-11 DIAGNOSIS — E78.5 HYPERLIPIDEMIA, UNSPECIFIED HYPERLIPIDEMIA TYPE: ICD-10-CM

## 2023-10-11 DIAGNOSIS — E11.9 CONTROLLED TYPE 2 DIABETES MELLITUS WITHOUT COMPLICATION, WITHOUT LONG-TERM CURRENT USE OF INSULIN (HCC): ICD-10-CM

## 2023-10-12 RX ORDER — ATORVASTATIN CALCIUM 40 MG/1
TABLET, FILM COATED ORAL DAILY
Qty: 90 TABLET | Refills: 3 | Status: SHIPPED | OUTPATIENT
Start: 2023-10-12

## 2023-10-12 NOTE — TELEPHONE ENCOUNTER
PCP: Jody Orozco MD    Last appt: 7/25/2023       Future Appointments   Date Time Provider [address]   10/30/2023 10:15 AM Jody Orozco MD [facility] AMB   2/14/2024  1:00 PM G2 KELSEY CONTRERAS [facility] [address]       Requested Prescriptions     Pending Prescriptions Disp Refills    metFORMIN (GLUCOPHAGE) 500 MG tablet [Pharmacy Med Name: metFORMIN HCl 500 MG Oral Tablet] 180 tablet 3     Sig: TAKE 1 TABLET BY MOUTH TWICE  DAILY WITH A MEAL    atorvastatin (LIPITOR) 40 MG tablet [Pharmacy Med Name: Atorvastatin Calcium 40 MG Oral Tablet] 90 tablet 3     Sig: TAKE 1 TABLET BY MOUTH DAILY       Prior labs and Blood pressures:  BP Readings from Last 3 Encounters:   08/14/23 (!) 151/79   07/25/23 130/82   06/06/22 138/72     Lab Results   Component Value Date/Time     07/21/2023 08:45 AM    K 4.6 07/21/2023 08:45 AM     07/21/2023 08:45 AM    CO2 27 07/21/2023 08:45 AM    BUN 14 07/21/2023 08:45 AM    GFRAA >60 06/06/2022 09:11 AM     No results found for: \"HBA1C\", \"[id number]\"  Lab Results   Component Value Date/Time    CHOL 149 07/21/2023 08:45 AM    HDL 69 07/21/2023 08:45 AM    VLDL 12 11/17/2020 09:45 AM     No results found for: \"VITD3\", \"VD3RIA\"    Lab Results   Component Value Date/Time    TSH 1.24 06/06/2022 09:11 AM

## 2023-10-30 ENCOUNTER — OFFICE VISIT (OUTPATIENT)
Age: 78
End: 2023-10-30
Payer: MEDICARE

## 2023-10-30 VITALS
TEMPERATURE: 96.9 F | WEIGHT: 152 LBS | DIASTOLIC BLOOD PRESSURE: 78 MMHG | OXYGEN SATURATION: 95 % | HEART RATE: 89 BPM | BODY MASS INDEX: 25.95 KG/M2 | SYSTOLIC BLOOD PRESSURE: 124 MMHG | HEIGHT: 64 IN | RESPIRATION RATE: 14 BRPM

## 2023-10-30 DIAGNOSIS — E11.9 CONTROLLED TYPE 2 DIABETES MELLITUS WITHOUT COMPLICATION, WITHOUT LONG-TERM CURRENT USE OF INSULIN (HCC): Primary | ICD-10-CM

## 2023-10-30 DIAGNOSIS — M81.0 OSTEOPOROSIS, UNSPECIFIED OSTEOPOROSIS TYPE, UNSPECIFIED PATHOLOGICAL FRACTURE PRESENCE: ICD-10-CM

## 2023-10-30 LAB — HBA1C MFR BLD: 5.9 %

## 2023-10-30 PROCEDURE — 1123F ACP DISCUSS/DSCN MKR DOCD: CPT | Performed by: FAMILY MEDICINE

## 2023-10-30 PROCEDURE — G8427 DOCREV CUR MEDS BY ELIG CLIN: HCPCS | Performed by: FAMILY MEDICINE

## 2023-10-30 PROCEDURE — G8419 CALC BMI OUT NRM PARAM NOF/U: HCPCS | Performed by: FAMILY MEDICINE

## 2023-10-30 PROCEDURE — 1036F TOBACCO NON-USER: CPT | Performed by: FAMILY MEDICINE

## 2023-10-30 PROCEDURE — 2022F DILAT RTA XM EVC RTNOPTHY: CPT | Performed by: FAMILY MEDICINE

## 2023-10-30 PROCEDURE — G8484 FLU IMMUNIZE NO ADMIN: HCPCS | Performed by: FAMILY MEDICINE

## 2023-10-30 PROCEDURE — 1090F PRES/ABSN URINE INCON ASSESS: CPT | Performed by: FAMILY MEDICINE

## 2023-10-30 PROCEDURE — 3074F SYST BP LT 130 MM HG: CPT | Performed by: FAMILY MEDICINE

## 2023-10-30 PROCEDURE — PBSHW AMB POC HEMOGLOBIN A1C: Performed by: FAMILY MEDICINE

## 2023-10-30 PROCEDURE — 83036 HEMOGLOBIN GLYCOSYLATED A1C: CPT | Performed by: FAMILY MEDICINE

## 2023-10-30 PROCEDURE — 3017F COLORECTAL CA SCREEN DOC REV: CPT | Performed by: FAMILY MEDICINE

## 2023-10-30 PROCEDURE — 3078F DIAST BP <80 MM HG: CPT | Performed by: FAMILY MEDICINE

## 2023-10-30 PROCEDURE — 99213 OFFICE O/P EST LOW 20 MIN: CPT | Performed by: FAMILY MEDICINE

## 2023-10-30 PROCEDURE — 3044F HG A1C LEVEL LT 7.0%: CPT | Performed by: FAMILY MEDICINE

## 2023-10-30 PROCEDURE — G8399 PT W/DXA RESULTS DOCUMENT: HCPCS | Performed by: FAMILY MEDICINE

## 2023-10-30 ASSESSMENT — ENCOUNTER SYMPTOMS
COUGH: 0
CHEST TIGHTNESS: 0
SHORTNESS OF BREATH: 0
ABDOMINAL PAIN: 0
CONSTIPATION: 0
DIARRHEA: 0
WHEEZING: 0
VOMITING: 0
NAUSEA: 0

## 2023-10-30 NOTE — PROGRESS NOTES
Patient Name: Nirali Rocha   MRN: [id number]    Isiah Schroeder is a 78 y.o. female who presents with the following:     Since last visit, we reduced metformin from 500 mg twice daily to 500 mg daily. A1c today is 5.9. She also has started Prolia for osteoporosis of which she has tolerated well. Hemoglobin A1C   Date Value Ref Range Status   07/21/2023 5.6 4.0 - 5.6 % Final     Comment:     NEW METHOD  PLEASE NOTE NEW REFERENCE RANGE  (NOTE)  HbA1C Interpretive Ranges  <5.7              Normal  5.7 - 6.4         Consider Prediabetes  >6.5              Consider Diabetes       BP Readings from Last 3 Encounters:   10/30/23 124/78   08/14/23 (!) 151/79   07/25/23 130/82     Wt Readings from Last 3 Encounters:   10/30/23 68.9 kg (152 lb)   08/11/23 68 kg (150 lb)   08/08/23 68 kg (150 lb)     Review of Systems   Constitutional:  Negative for activity change, appetite change, fatigue, fever and unexpected weight change. Respiratory:  Negative for cough, chest tightness, shortness of breath and wheezing. Cardiovascular:  Negative for chest pain, palpitations and leg swelling. Gastrointestinal:  Negative for abdominal pain, constipation, diarrhea, nausea and vomiting. Genitourinary:  Negative for dysuria, frequency and urgency. Skin:  Negative for rash. Neurological:  Negative for dizziness, weakness and headaches. Psychiatric/Behavioral:  Negative for dysphoric mood and suicidal ideas. The patient is not nervous/anxious. All other systems reviewed and are negative. The patient's medications, allergies, past medical history, surgical history, family history and social history were reviewed and updated where appropriate.     Current Outpatient Medications on File Prior to Visit   Medication Sig Dispense Refill    metFORMIN (GLUCOPHAGE) 500 MG tablet TAKE 1 TABLET BY MOUTH TWICE  DAILY WITH A MEAL (Patient taking differently: Take 1 tablet by mouth daily (with breakfast)) 180 tablet 3

## 2023-11-13 RX ORDER — OLMESARTAN MEDOXOMIL 40 MG/1
40 TABLET ORAL DAILY
Qty: 90 TABLET | Refills: 1 | Status: SHIPPED | OUTPATIENT
Start: 2023-11-13

## 2023-11-13 NOTE — TELEPHONE ENCOUNTER
Last appointment: 10/30/23 Man Jones  Next appointment: 1/30/24 Man Jones  Previous refill encounter(s): 5/18/23 90 + 1    Requested Prescriptions     Pending Prescriptions Disp Refills    olmesartan (BENICAR) 40 MG tablet 90 tablet 1     Sig: Take 1 tablet by mouth daily     For Pharmacy Admin Tracking Only    Program: Medication Refill  CPA in place:    Recommendation Provided To:    Intervention Detail: New Rx: 1, reason: Patient Preference  Intervention Accepted By:   Leonid Gao Closed?:    Time Spent (min): 5

## 2023-12-15 ENCOUNTER — TELEPHONE (OUTPATIENT)
Age: 78
End: 2023-12-15

## 2023-12-15 NOTE — TELEPHONE ENCOUNTER
Called Patient. Name and  confirmed. Informed her that she needs to schedule vv visit or urgent care. She stated she will go urgent care. Verbalized understanding.

## 2023-12-15 NOTE — TELEPHONE ENCOUNTER
Pt called said 12/15 she tested positive for covid she is experiencing runny nose and a cough. Offered vv pt refused. Pt would like paxlovid to be called in.      BCB# [phone]

## 2024-01-14 NOTE — PATIENT INSTRUCTIONS
Bronchitis: Care Instructions  Your Care Instructions    Bronchitis is inflammation of the bronchial tubes, which carry air to the lungs. The tubes swell and produce mucus, or phlegm. The mucus and inflamed bronchial tubes make you cough. You may have trouble breathing. Most cases of bronchitis are caused by viruses like those that cause colds. Antibiotics usually do not help and they may be harmful. Bronchitis usually develops rapidly and lasts about 2 to 3 weeks in otherwise healthy people. Follow-up care is a key part of your treatment and safety. Be sure to make and go to all appointments, and call your doctor if you are having problems. It's also a good idea to know your test results and keep a list of the medicines you take. How can you care for yourself at home? · Take all medicines exactly as prescribed. Call your doctor if you think you are having a problem with your medicine. · Get some extra rest.  · Take an over-the-counter pain medicine, such as acetaminophen (Tylenol), ibuprofen (Advil, Motrin), or naproxen (Aleve) to reduce fever and relieve body aches. Read and follow all instructions on the label. · Do not take two or more pain medicines at the same time unless the doctor told you to. Many pain medicines have acetaminophen, which is Tylenol. Too much acetaminophen (Tylenol) can be harmful. · Take an over-the-counter cough medicine that contains dextromethorphan to help quiet a dry, hacking cough so that you can sleep. Avoid cough medicines that have more than one active ingredient. Read and follow all instructions on the label. · Breathe moist air from a humidifier, hot shower, or sink filled with hot water. The heat and moisture will thin mucus so you can cough it out. · Do not smoke. Smoking can make bronchitis worse. If you need help quitting, talk to your doctor about stop-smoking programs and medicines. These can increase your chances of quitting for good.   When should you call for help? Call 911 anytime you think you may need emergency care. For example, call if:  ? · You have severe trouble breathing. ?Call your doctor now or seek immediate medical care if:  ? · You have new or worse trouble breathing. ? · You cough up dark brown or bloody mucus (sputum). ? · You have a new or higher fever. ? · You have a new rash. ? Watch closely for changes in your health, and be sure to contact your doctor if:  ? · You cough more deeply or more often, especially if you notice more mucus or a change in the color of your mucus. ? · You are not getting better as expected. Where can you learn more? Go to http://ivy-yoandy.info/. Enter H333 in the search box to learn more about \"Bronchitis: Care Instructions. \"  Current as of: May 12, 2017  Content Version: 11.4  © 7180-9123 [employer]. Care instructions adapted under license by [employer] (which disclaims liability or warranty for this information). If you have questions about a medical condition or this instruction, always ask your healthcare professional. [address] any warranty or liability for your use of this information. No

## 2024-01-30 ENCOUNTER — OFFICE VISIT (OUTPATIENT)
Age: 79
End: 2024-01-30
Payer: MEDICARE

## 2024-01-30 VITALS
BODY MASS INDEX: 25.95 KG/M2 | TEMPERATURE: 97.1 F | DIASTOLIC BLOOD PRESSURE: 72 MMHG | SYSTOLIC BLOOD PRESSURE: 130 MMHG | OXYGEN SATURATION: 97 % | RESPIRATION RATE: 14 BRPM | WEIGHT: 152 LBS | HEIGHT: 64 IN | HEART RATE: 84 BPM

## 2024-01-30 DIAGNOSIS — E11.9 CONTROLLED TYPE 2 DIABETES MELLITUS WITHOUT COMPLICATION, WITHOUT LONG-TERM CURRENT USE OF INSULIN (HCC): Primary | ICD-10-CM

## 2024-01-30 DIAGNOSIS — M81.0 OSTEOPOROSIS, UNSPECIFIED OSTEOPOROSIS TYPE, UNSPECIFIED PATHOLOGICAL FRACTURE PRESENCE: ICD-10-CM

## 2024-01-30 PROBLEM — Z85.3 HISTORY OF BREAST CANCER: Status: ACTIVE | Noted: 2017-06-16

## 2024-01-30 LAB
ANION GAP SERPL CALC-SCNC: 5 MMOL/L (ref 5–15)
BUN SERPL-MCNC: 19 MG/DL (ref 6–20)
BUN/CREAT SERPL: 25 (ref 12–20)
CALCIUM SERPL-MCNC: 9.3 MG/DL (ref 8.5–10.1)
CHLORIDE SERPL-SCNC: 101 MMOL/L (ref 97–108)
CO2 SERPL-SCNC: 29 MMOL/L (ref 21–32)
CREAT SERPL-MCNC: 0.75 MG/DL (ref 0.55–1.02)
GLUCOSE SERPL-MCNC: 103 MG/DL (ref 65–100)
HBA1C MFR BLD: 5.9 %
MAGNESIUM SERPL-MCNC: 2 MG/DL (ref 1.6–2.4)
PHOSPHATE SERPL-MCNC: 4.3 MG/DL (ref 2.6–4.7)
POTASSIUM SERPL-SCNC: 4.9 MMOL/L (ref 3.5–5.1)
SODIUM SERPL-SCNC: 135 MMOL/L (ref 136–145)

## 2024-01-30 PROCEDURE — 99214 OFFICE O/P EST MOD 30 MIN: CPT | Performed by: FAMILY MEDICINE

## 2024-01-30 PROCEDURE — G8484 FLU IMMUNIZE NO ADMIN: HCPCS | Performed by: FAMILY MEDICINE

## 2024-01-30 PROCEDURE — 3075F SYST BP GE 130 - 139MM HG: CPT | Performed by: FAMILY MEDICINE

## 2024-01-30 PROCEDURE — PBSHW AMB POC HEMOGLOBIN A1C: Performed by: FAMILY MEDICINE

## 2024-01-30 PROCEDURE — 83036 HEMOGLOBIN GLYCOSYLATED A1C: CPT | Performed by: FAMILY MEDICINE

## 2024-01-30 PROCEDURE — 3078F DIAST BP <80 MM HG: CPT | Performed by: FAMILY MEDICINE

## 2024-01-30 PROCEDURE — 1090F PRES/ABSN URINE INCON ASSESS: CPT | Performed by: FAMILY MEDICINE

## 2024-01-30 PROCEDURE — G8419 CALC BMI OUT NRM PARAM NOF/U: HCPCS | Performed by: FAMILY MEDICINE

## 2024-01-30 PROCEDURE — 1123F ACP DISCUSS/DSCN MKR DOCD: CPT | Performed by: FAMILY MEDICINE

## 2024-01-30 PROCEDURE — G8399 PT W/DXA RESULTS DOCUMENT: HCPCS | Performed by: FAMILY MEDICINE

## 2024-01-30 PROCEDURE — 1036F TOBACCO NON-USER: CPT | Performed by: FAMILY MEDICINE

## 2024-01-30 PROCEDURE — G8427 DOCREV CUR MEDS BY ELIG CLIN: HCPCS | Performed by: FAMILY MEDICINE

## 2024-01-30 ASSESSMENT — ENCOUNTER SYMPTOMS
WHEEZING: 0
DIARRHEA: 0
VOMITING: 0
NAUSEA: 0
ABDOMINAL PAIN: 0
CONSTIPATION: 0
SHORTNESS OF BREATH: 0
CHEST TIGHTNESS: 0
COUGH: 0

## 2024-01-30 ASSESSMENT — PATIENT HEALTH QUESTIONNAIRE - PHQ9
SUM OF ALL RESPONSES TO PHQ9 QUESTIONS 1 & 2: 0
SUM OF ALL RESPONSES TO PHQ QUESTIONS 1-9: 0
2. FEELING DOWN, DEPRESSED OR HOPELESS: 0
1. LITTLE INTEREST OR PLEASURE IN DOING THINGS: 0
SUM OF ALL RESPONSES TO PHQ QUESTIONS 1-9: 0

## 2024-01-30 NOTE — PROGRESS NOTES
Patient Name: Natalie Vick   MRN: [id number]    SUBJECTIVE  Natalie Vick is a 78 y.o. female who presents with the following:     Since last visit, we stopped her metformin due to well-controlled A1c.  Still stable at 5.9 today.  She will be getting her Prolia injection soon so needs labs done.  Recovering from a recent COVID infection.  Has occasional vertigo when she turns to her left side.  Overall not bothersome to her.    BP Readings from Last 3 Encounters:   01/30/24 130/72   10/30/23 124/78   08/14/23 (!) 151/79     Wt Readings from Last 3 Encounters:   01/30/24 68.9 kg (152 lb)   11/13/23 68.9 kg (152 lb)   10/30/23 68.9 kg (152 lb)       Review of Systems   Constitutional:  Negative for activity change, appetite change, fatigue, fever and unexpected weight change.   Respiratory:  Negative for cough, chest tightness, shortness of breath and wheezing.    Cardiovascular:  Negative for chest pain, palpitations and leg swelling.   Gastrointestinal:  Negative for abdominal pain, constipation, diarrhea, nausea and vomiting.   Genitourinary:  Negative for dysuria, frequency and urgency.   Skin:  Negative for rash.   Neurological:  Negative for dizziness, weakness and headaches.   Psychiatric/Behavioral:  Negative for dysphoric mood and suicidal ideas. The patient is not nervous/anxious.    All other systems reviewed and are negative.        The patient's medications, allergies, past medical history, surgical history, family history and social history were reviewed and updated where appropriate.    Current Outpatient Medications on File Prior to Visit   Medication Sig Dispense Refill    olmesartan (BENICAR) 40 MG tablet Take 1 tablet by mouth daily 90 tablet 1    atorvastatin (LIPITOR) 40 MG tablet TAKE 1 TABLET BY MOUTH DAILY 90 tablet 3    mesalamine (APRISO) 0.375 g extended release capsule       albuterol sulfate HFA (PROVENTIL;VENTOLIN;PROAIR) 108 (90 Base) MCG/ACT inhaler Inhale 2 puffs into the lungs every 6

## 2024-01-30 NOTE — PROGRESS NOTES
Chief Complaint   Patient presents with    Diabetes     \"Have you been to the ER, urgent care clinic since your last visit?  Hospitalized since your last visit?\"    NO    “Have you seen or consulted any other health care providers outside of [facility] since your last visit?”    NO           Financial Resource Strain: Low Risk  (7/22/2023)    Overall Financial Resource Strain ([facility])     Difficulty of Paying Living Expenses: Not hard at all      Food Insecurity: Not on file (7/22/2023)            1/30/2024     9:27 AM   PHQ-9    Little interest or pleasure in doing things 0   Feeling down, depressed, or hopeless 0   PHQ-2 Score 0   PHQ-9 Total Score 0       Health Maintenance Due   Topic Date Due    Pneumococcal 65+ years Vaccine (2 - PCV) 09/18/2015    COVID-19 Vaccine (3 - 2023-24 season) 09/01/2023

## 2024-02-12 PROBLEM — M19.011 PRIMARY OSTEOARTHRITIS, RIGHT SHOULDER: Status: ACTIVE | Noted: 2024-02-12

## 2024-02-12 PROBLEM — I10 PRIMARY HYPERTENSION: Status: ACTIVE | Noted: 2024-02-12

## 2024-02-29 ENCOUNTER — HOSPITAL ENCOUNTER (OUTPATIENT)
Facility: HOSPITAL | Age: 79
Setting detail: INFUSION SERIES
Discharge: HOME | End: 2024-02-29
Payer: MEDICARE

## 2024-02-29 VITALS
HEART RATE: 98 BPM | BODY MASS INDEX: 26.63 KG/M2 | OXYGEN SATURATION: 97 % | DIASTOLIC BLOOD PRESSURE: 74 MMHG | WEIGHT: 156 LBS | HEIGHT: 64 IN | TEMPERATURE: 97.7 F | RESPIRATION RATE: 16 BRPM | SYSTOLIC BLOOD PRESSURE: 130 MMHG

## 2024-02-29 DIAGNOSIS — M81.0 OSTEOPOROSIS, UNSPECIFIED OSTEOPOROSIS TYPE, UNSPECIFIED PATHOLOGICAL FRACTURE PRESENCE: Primary | ICD-10-CM

## 2024-02-29 PROCEDURE — 96372 THER/PROPH/DIAG INJ SC/IM: CPT

## 2024-02-29 PROCEDURE — 6360000002 HC RX W HCPCS: Performed by: FAMILY MEDICINE

## 2024-02-29 RX ORDER — ACETAMINOPHEN 325 MG/1
650 TABLET ORAL
OUTPATIENT
Start: 2024-08-11

## 2024-02-29 RX ORDER — EPINEPHRINE 1 MG/ML
0.3 INJECTION, SOLUTION INTRAMUSCULAR; SUBCUTANEOUS PRN
OUTPATIENT
Start: 2024-08-11

## 2024-02-29 RX ORDER — SODIUM CHLORIDE 9 MG/ML
INJECTION, SOLUTION INTRAVENOUS CONTINUOUS
OUTPATIENT
Start: 2024-08-11

## 2024-02-29 RX ORDER — DIPHENHYDRAMINE HYDROCHLORIDE 50 MG/ML
50 INJECTION INTRAMUSCULAR; INTRAVENOUS
OUTPATIENT
Start: 2024-08-11

## 2024-02-29 RX ORDER — ONDANSETRON 2 MG/ML
8 INJECTION INTRAMUSCULAR; INTRAVENOUS
OUTPATIENT
Start: 2024-08-11

## 2024-02-29 RX ORDER — ALBUTEROL SULFATE 90 UG/1
4 AEROSOL, METERED RESPIRATORY (INHALATION) PRN
OUTPATIENT
Start: 2024-08-11

## 2024-02-29 RX ADMIN — DENOSUMAB 60 MG: 60 INJECTION SUBCUTANEOUS at 13:45

## 2024-02-29 NOTE — PLAN OF CARE
[facility] Visit Notes                 Date: 2024  Name: Natalie Vick  MRN: [id number]       : 1945    Pt arrived ambulatory and in no acute distress to [facility] for Prolia  Denies fever, cough, and N/V- denies covid-like symptoms.   Labs ordered/ completed 24 and within treatment parameters for Prolia injection  Received injection in Left Arm    Tolerated procedure well without issue. Bandaid and Gauze Applied to site.   Patient aware of upcoming appointment. Patient declined post- monitoring time. Education provided.     Ms. Vick's vitals were reviewed prior to treatment.   Patient Vitals for the past 12 hrs:   Temp Pulse Resp BP SpO2   24 1335 97.7 °F (36.5 °C) 98 16 130/74 97 %     Medications Administered         denosumab (PROLIA) SC injection 60 mg Admin Date  2024 Action  Given Dose  60 mg Route  SubCUTAneous Administered By  Deborah Capone RN          Future Appointments   Date Time Provider Department Center   2024 10:00 AM Rolando Hein III, MD [facility] BS AMB   2024  8:30 AM Yonathan Adam MD [facility] BS AMB     Deborah Capone RN  2024      Problem: Chronic Conditions and Co-morbidities  Goal: Patient's chronic conditions and co-morbidity symptoms are monitored and maintained or improved  Outcome: Progressing

## 2024-05-16 RX ORDER — OLMESARTAN MEDOXOMIL 40 MG/1
40 TABLET ORAL DAILY
Qty: 90 TABLET | Refills: 2 | Status: SHIPPED | OUTPATIENT
Start: 2024-05-16

## 2024-05-16 NOTE — TELEPHONE ENCOUNTER
Last appointment: 1/30/24 Kia  Next appointment: 7/30/24 Kia  Previous refill encounter(s): 11/13/23 90 + 1    Requested Prescriptions     Pending Prescriptions Disp Refills    olmesartan (BENICAR) 40 MG tablet 90 tablet 1     Sig: Take 1 tablet by mouth daily     For Pharmacy Admin Tracking Only    Program: Medication Refill  CPA in place:    Recommendation Provided To:   Intervention Detail: New Rx: 1, reason: Patient Preference  Intervention Accepted By:   Gap Closed?:    Time Spent (min): 5

## 2024-07-23 ENCOUNTER — TELEPHONE (OUTPATIENT)
Age: 79
End: 2024-07-23

## 2024-07-23 DIAGNOSIS — E11.9 CONTROLLED TYPE 2 DIABETES MELLITUS WITHOUT COMPLICATION, WITHOUT LONG-TERM CURRENT USE OF INSULIN (HCC): Primary | ICD-10-CM

## 2024-07-23 DIAGNOSIS — E78.5 HYPERLIPIDEMIA, UNSPECIFIED HYPERLIPIDEMIA TYPE: ICD-10-CM

## 2024-07-23 DIAGNOSIS — M81.0 OSTEOPOROSIS, UNSPECIFIED OSTEOPOROSIS TYPE, UNSPECIFIED PATHOLOGICAL FRACTURE PRESENCE: ICD-10-CM

## 2024-07-26 ENCOUNTER — LAB (OUTPATIENT)
Age: 79
End: 2024-07-26

## 2024-07-26 DIAGNOSIS — E78.5 HYPERLIPIDEMIA, UNSPECIFIED HYPERLIPIDEMIA TYPE: ICD-10-CM

## 2024-07-26 DIAGNOSIS — M81.0 OSTEOPOROSIS, UNSPECIFIED OSTEOPOROSIS TYPE, UNSPECIFIED PATHOLOGICAL FRACTURE PRESENCE: ICD-10-CM

## 2024-07-26 DIAGNOSIS — E11.9 CONTROLLED TYPE 2 DIABETES MELLITUS WITHOUT COMPLICATION, WITHOUT LONG-TERM CURRENT USE OF INSULIN (HCC): ICD-10-CM

## 2024-07-26 LAB
25(OH)D3 SERPL-MCNC: 38 NG/ML (ref 30–100)
ALBUMIN SERPL-MCNC: 3.8 G/DL (ref 3.5–5)
ALBUMIN/GLOB SERPL: 1.4 (ref 1.1–2.2)
ALP SERPL-CCNC: 68 U/L (ref 45–117)
ALT SERPL-CCNC: 11 U/L (ref 12–78)
ANION GAP SERPL CALC-SCNC: 8 MMOL/L (ref 5–15)
AST SERPL-CCNC: 16 U/L (ref 15–37)
BILIRUB SERPL-MCNC: 0.7 MG/DL (ref 0.2–1)
BUN SERPL-MCNC: 14 MG/DL (ref 6–20)
BUN/CREAT SERPL: 24 (ref 12–20)
CALCIUM SERPL-MCNC: 9.4 MG/DL (ref 8.5–10.1)
CHLORIDE SERPL-SCNC: 99 MMOL/L (ref 97–108)
CHOLEST SERPL-MCNC: 177 MG/DL
CO2 SERPL-SCNC: 26 MMOL/L (ref 21–32)
CREAT SERPL-MCNC: 0.58 MG/DL (ref 0.55–1.02)
CREAT UR-MCNC: 54.9 MG/DL
ERYTHROCYTE [DISTWIDTH] IN BLOOD BY AUTOMATED COUNT: 12.7 % (ref 11.5–14.5)
EST. AVERAGE GLUCOSE BLD GHB EST-MCNC: 123 MG/DL
GLOBULIN SER CALC-MCNC: 2.8 G/DL (ref 2–4)
GLUCOSE SERPL-MCNC: 120 MG/DL (ref 65–100)
HBA1C MFR BLD: 5.9 % (ref 4–5.6)
HCT VFR BLD AUTO: 40.1 % (ref 35–47)
HDLC SERPL-MCNC: 77 MG/DL
HDLC SERPL: 2.3 (ref 0–5)
HGB BLD-MCNC: 13.6 G/DL (ref 11.5–16)
LDLC SERPL CALC-MCNC: 85.8 MG/DL (ref 0–100)
MCH RBC QN AUTO: 31.6 PG (ref 26–34)
MCHC RBC AUTO-ENTMCNC: 33.9 G/DL (ref 30–36.5)
MCV RBC AUTO: 93 FL (ref 80–99)
MICROALBUMIN UR-MCNC: 0.59 MG/DL
MICROALBUMIN/CREAT UR-RTO: 11 MG/G (ref 0–30)
NRBC # BLD: 0 K/UL (ref 0–0.01)
NRBC BLD-RTO: 0 PER 100 WBC
PLATELET # BLD AUTO: 248 K/UL (ref 150–400)
PMV BLD AUTO: 10.5 FL (ref 8.9–12.9)
POTASSIUM SERPL-SCNC: 4.5 MMOL/L (ref 3.5–5.1)
PROT SERPL-MCNC: 6.6 G/DL (ref 6.4–8.2)
RBC # BLD AUTO: 4.31 M/UL (ref 3.8–5.2)
SODIUM SERPL-SCNC: 133 MMOL/L (ref 136–145)
TRIGL SERPL-MCNC: 71 MG/DL
VLDLC SERPL CALC-MCNC: 14.2 MG/DL
WBC # BLD AUTO: 6.4 K/UL (ref 3.6–11)

## 2024-07-27 SDOH — ECONOMIC STABILITY: FOOD INSECURITY: WITHIN THE PAST 12 MONTHS, YOU WORRIED THAT YOUR FOOD WOULD RUN OUT BEFORE YOU GOT MONEY TO BUY MORE.: NEVER TRUE

## 2024-07-27 SDOH — HEALTH STABILITY: PHYSICAL HEALTH: ON AVERAGE, HOW MANY MINUTES DO YOU ENGAGE IN EXERCISE AT THIS LEVEL?: 0 MIN

## 2024-07-27 SDOH — HEALTH STABILITY: PHYSICAL HEALTH: ON AVERAGE, HOW MANY DAYS PER WEEK DO YOU ENGAGE IN MODERATE TO STRENUOUS EXERCISE (LIKE A BRISK WALK)?: 0 DAYS

## 2024-07-27 SDOH — ECONOMIC STABILITY: FOOD INSECURITY: WITHIN THE PAST 12 MONTHS, THE FOOD YOU BOUGHT JUST DIDN'T LAST AND YOU DIDN'T HAVE MONEY TO GET MORE.: NEVER TRUE

## 2024-07-27 SDOH — ECONOMIC STABILITY: INCOME INSECURITY: HOW HARD IS IT FOR YOU TO PAY FOR THE VERY BASICS LIKE FOOD, HOUSING, MEDICAL CARE, AND HEATING?: NOT HARD AT ALL

## 2024-07-27 ASSESSMENT — LIFESTYLE VARIABLES
HAVE YOU OR SOMEONE ELSE BEEN INJURED AS A RESULT OF YOUR DRINKING: NO
HOW OFTEN DO YOU HAVE A DRINK CONTAINING ALCOHOL: 4 OR MORE TIMES A WEEK
HOW OFTEN DURING THE LAST YEAR HAVE YOU FOUND THAT YOU WERE NOT ABLE TO STOP DRINKING ONCE YOU HAD STARTED: NEVER
HOW OFTEN DURING THE LAST YEAR HAVE YOU FOUND THAT YOU WERE NOT ABLE TO STOP DRINKING ONCE YOU HAD STARTED: NEVER
HAS A RELATIVE, FRIEND, DOCTOR, OR ANOTHER HEALTH PROFESSIONAL EXPRESSED CONCERN ABOUT YOUR DRINKING OR SUGGESTED YOU CUT DOWN: NO
HOW OFTEN DURING THE LAST YEAR HAVE YOU HAD A FEELING OF GUILT OR REMORSE AFTER DRINKING: NEVER
HOW OFTEN DURING THE LAST YEAR HAVE YOU NEEDED AN ALCOHOLIC DRINK FIRST THING IN THE MORNING TO GET YOURSELF GOING AFTER A NIGHT OF HEAVY DRINKING: NEVER
HAS A RELATIVE, FRIEND, DOCTOR, OR ANOTHER HEALTH PROFESSIONAL EXPRESSED CONCERN ABOUT YOUR DRINKING OR SUGGESTED YOU CUT DOWN: NO
HOW OFTEN DURING THE LAST YEAR HAVE YOU HAD A FEELING OF GUILT OR REMORSE AFTER DRINKING: NEVER
HOW OFTEN DO YOU HAVE A DRINK CONTAINING ALCOHOL: 5
HOW OFTEN DURING THE LAST YEAR HAVE YOU BEEN UNABLE TO REMEMBER WHAT HAPPENED THE NIGHT BEFORE BECAUSE YOU HAD BEEN DRINKING: NEVER
HOW MANY STANDARD DRINKS CONTAINING ALCOHOL DO YOU HAVE ON A TYPICAL DAY: 1 OR 2
HAVE YOU OR SOMEONE ELSE BEEN INJURED AS A RESULT OF YOUR DRINKING: NO
HOW OFTEN DURING THE LAST YEAR HAVE YOU NEEDED AN ALCOHOLIC DRINK FIRST THING IN THE MORNING TO GET YOURSELF GOING AFTER A NIGHT OF HEAVY DRINKING: NEVER
HOW OFTEN DURING THE LAST YEAR HAVE YOU FAILED TO DO WHAT WAS NORMALLY EXPECTED FROM YOU BECAUSE OF DRINKING: NEVER
HOW OFTEN DURING THE LAST YEAR HAVE YOU FAILED TO DO WHAT WAS NORMALLY EXPECTED FROM YOU BECAUSE OF DRINKING: NEVER
HOW OFTEN DURING THE LAST YEAR HAVE YOU BEEN UNABLE TO REMEMBER WHAT HAPPENED THE NIGHT BEFORE BECAUSE YOU HAD BEEN DRINKING: NEVER
HOW OFTEN DO YOU HAVE SIX OR MORE DRINKS ON ONE OCCASION: 1
HOW MANY STANDARD DRINKS CONTAINING ALCOHOL DO YOU HAVE ON A TYPICAL DAY: 1

## 2024-07-27 ASSESSMENT — PATIENT HEALTH QUESTIONNAIRE - PHQ9
2. FEELING DOWN, DEPRESSED OR HOPELESS: NOT AT ALL
SUM OF ALL RESPONSES TO PHQ QUESTIONS 1-9: 0
SUM OF ALL RESPONSES TO PHQ9 QUESTIONS 1 & 2: 0
1. LITTLE INTEREST OR PLEASURE IN DOING THINGS: NOT AT ALL

## 2024-07-30 ENCOUNTER — OFFICE VISIT (OUTPATIENT)
Age: 79
End: 2024-07-30
Payer: MEDICARE

## 2024-07-30 VITALS
HEART RATE: 80 BPM | TEMPERATURE: 97.8 F | OXYGEN SATURATION: 97 % | SYSTOLIC BLOOD PRESSURE: 122 MMHG | WEIGHT: 152.6 LBS | HEIGHT: 64 IN | DIASTOLIC BLOOD PRESSURE: 74 MMHG | RESPIRATION RATE: 16 BRPM | BODY MASS INDEX: 26.05 KG/M2

## 2024-07-30 DIAGNOSIS — E11.9 CONTROLLED TYPE 2 DIABETES MELLITUS WITHOUT COMPLICATION, WITHOUT LONG-TERM CURRENT USE OF INSULIN (HCC): ICD-10-CM

## 2024-07-30 DIAGNOSIS — Z00.00 ENCOUNTER FOR MEDICARE ANNUAL WELLNESS EXAM: Primary | ICD-10-CM

## 2024-07-30 DIAGNOSIS — E78.5 HYPERLIPIDEMIA, UNSPECIFIED HYPERLIPIDEMIA TYPE: ICD-10-CM

## 2024-07-30 PROCEDURE — 99213 OFFICE O/P EST LOW 20 MIN: CPT | Performed by: FAMILY MEDICINE

## 2024-07-30 PROCEDURE — G8419 CALC BMI OUT NRM PARAM NOF/U: HCPCS | Performed by: FAMILY MEDICINE

## 2024-07-30 PROCEDURE — 3074F SYST BP LT 130 MM HG: CPT | Performed by: FAMILY MEDICINE

## 2024-07-30 PROCEDURE — G8399 PT W/DXA RESULTS DOCUMENT: HCPCS | Performed by: FAMILY MEDICINE

## 2024-07-30 PROCEDURE — G0439 PPPS, SUBSEQ VISIT: HCPCS | Performed by: FAMILY MEDICINE

## 2024-07-30 PROCEDURE — G8427 DOCREV CUR MEDS BY ELIG CLIN: HCPCS | Performed by: FAMILY MEDICINE

## 2024-07-30 PROCEDURE — 3078F DIAST BP <80 MM HG: CPT | Performed by: FAMILY MEDICINE

## 2024-07-30 PROCEDURE — 1123F ACP DISCUSS/DSCN MKR DOCD: CPT | Performed by: FAMILY MEDICINE

## 2024-07-30 PROCEDURE — 1036F TOBACCO NON-USER: CPT | Performed by: FAMILY MEDICINE

## 2024-07-30 PROCEDURE — 3044F HG A1C LEVEL LT 7.0%: CPT | Performed by: FAMILY MEDICINE

## 2024-07-30 PROCEDURE — 1090F PRES/ABSN URINE INCON ASSESS: CPT | Performed by: FAMILY MEDICINE

## 2024-07-30 ASSESSMENT — ENCOUNTER SYMPTOMS
WHEEZING: 0
NAUSEA: 0
CHEST TIGHTNESS: 0
COUGH: 0
DIARRHEA: 0
VOMITING: 0
SHORTNESS OF BREATH: 0
CONSTIPATION: 0
ABDOMINAL PAIN: 0

## 2024-07-30 NOTE — PROGRESS NOTES
Chief Complaint   Patient presents with    Medicare AWV    Shoulder Pain     Right shoulder- 7/10 and left shoulder 3/10.      \"Have you been to the ER, urgent care clinic since your last visit?  Hospitalized since your last visit?\"    NO    “Have you seen or consulted any other health care providers outside of [facility] since your last visit?”    NO                Financial Resource Strain: Low Risk  (7/27/2024)    Overall Financial Resource Strain (CARDIA)     Difficulty of Paying Living Expenses: Not hard at all      Food Insecurity: No Food Insecurity (7/27/2024)    Hunger Vital Sign     Worried About Running Out of Food in the Last Year: Never true     Ran Out of Food in the Last Year: Never true            7/27/2024     7:39 AM   PHQ-9    Little interest or pleasure in doing things 0   Feeling down, depressed, or hopeless 0   PHQ-2 Score 0   PHQ-9 Total Score 0       Health Maintenance Due   Topic Date Due    Pneumococcal 65+ years Vaccine (2 of 2 - PCV) 09/18/2015    COVID-19 Vaccine (3 - 2023-24 season) 09/01/2023    DTaP/Tdap/Td vaccine (2 - Td or Tdap) 04/01/2024    Annual Wellness Visit (Medicare)  07/25/2024             
Medicare Annual Wellness Visit    Natalie Vick is here for Medicare AWV and Shoulder Pain (Right shoulder- 7/10 and left shoulder 3/10. )    Assessment & Plan   Encounter for Medicare annual wellness exam  Controlled type 2 diabetes mellitus without complication, without long-term current use of insulin (HCC)  Hyperlipidemia, unspecified hyperlipidemia type     Recommendations for Preventive Services Due: see orders and patient instructions/AVS.  Recommended screening schedule for the next 5-10 years is provided to the patient in written form: see Patient Instructions/AVS.     Return in about 6 months (around 1/30/2025) for HTN, DM.     Subjective     Patient's complete Health Risk Assessment and screening values have been reviewed and are found in Flowsheets. The following problems were reviewed today and where indicated follow up appointments were made and/or referrals ordered.    Positive Risk Factor Screenings with Interventions:    Fall Risk:  Do you feel unsteady or are you worried about falling? : (!) yes  2 or more falls in past year?: (!) yes  Fall with injury in past year?: no     Interventions:    Reviewed medications, home hazards, visual acuity, and co-morbidities that can increase risk for falls  Patient declines any further evaluation or treatment    Cognitive:      Words recalled: 2 Words Recalled     Total Score Interpretation: Abnormal Mini-Cog  Interventions:  Patient declines any further evaluation or treatment      Drug Use:   Substance and Sexual Activity   Drug Use Yes    Types: Marijuana (Weed)    Comment: Hemp derived THC in gummies for sleep     Interventions:  Patient declined any further intervention or treatment         Inactivity:  On average, how many days per week do you engage in moderate to strenuous exercise (like a brisk walk)?: 0 days (!) Abnormal  On average, how many minutes do you engage in exercise at this level?: 0 min  Interventions:  Patient declined any further 
 Extraocular Movements: Extraocular movements intact.      Conjunctiva/sclera: Conjunctivae normal.      Pupils: Pupils are equal, round, and reactive to light.   Cardiovascular:      Rate and Rhythm: Normal rate and regular rhythm.      Pulses: Normal pulses.      Heart sounds: Normal heart sounds. No murmur heard.     No friction rub. No gallop.   Pulmonary:      Effort: Pulmonary effort is normal. No respiratory distress.      Breath sounds: Normal breath sounds. No stridor. No wheezing, rhonchi or rales.   Musculoskeletal:      Cervical back: Normal range of motion.   Skin:     General: Skin is warm.      Findings: No rash.   Neurological:      General: No focal deficit present.      Mental Status: She is alert and oriented to person, place, and time. Mental status is at baseline.         Treatment risks/benefits/costs/interactions/alternatives discussed with patient.  Advised patient to call back or return to office if symptoms worsen/change/persist. If patient cannot reach us or should anything more severe/urgent arise he/she should proceed directly to the nearest emergency department.  Discussed expected course/resolution/complications of diagnosis in detail with patient.  Patient expressed understanding with the diagnosis and plan.     This dictation may have been completed with Dragon, the [employer] voice recognition software.  Unanticipated grammatical, syntax, homophones, and other interpretive errors are sometimes inadvertently transcribed by the computer software.  Please disregard any errors that have escaped final proofreading.      Yonathan Adam M.D.

## 2024-08-05 DIAGNOSIS — E78.5 HYPERLIPIDEMIA, UNSPECIFIED HYPERLIPIDEMIA TYPE: ICD-10-CM

## 2024-08-06 RX ORDER — ATORVASTATIN CALCIUM 40 MG/1
TABLET, FILM COATED ORAL DAILY
Qty: 90 TABLET | Refills: 3 | Status: SHIPPED | OUTPATIENT
Start: 2024-08-06

## 2024-08-06 NOTE — TELEPHONE ENCOUNTER
PCP: Yonathan Adam MD    Last appt: 7/30/2024       Future Appointments   Date Time Provider Department Center   8/13/2024  8:00 AM Rolando Hein III, MD [facility] BS AMB       Requested Prescriptions     Pending Prescriptions Disp Refills    atorvastatin (LIPITOR) 40 MG tablet [Pharmacy Med Name: Atorvastatin Calcium 40 MG Oral Tablet] 90 tablet 3     Sig: TAKE 1 TABLET BY MOUTH DAILY       Prior labs and Blood pressures:  BP Readings from Last 3 Encounters:   07/30/24 122/74   02/29/24 130/74   01/30/24 130/72     Lab Results   Component Value Date/Time     07/26/2024 08:48 AM    K 4.5 07/26/2024 08:48 AM    CL 99 07/26/2024 08:48 AM    CO2 26 07/26/2024 08:48 AM    BUN 14 07/26/2024 08:48 AM    GFRAA >60 06/06/2022 09:11 AM     Lab Results   Component Value Date/Time    HLL6SLSO 5.9 01/30/2024 09:55 AM     Lab Results   Component Value Date/Time    CHOL 177 07/26/2024 08:48 AM    HDL 77 07/26/2024 08:48 AM    LDL 85.8 07/26/2024 08:48 AM    LDL 61.6 07/21/2023 08:45 AM    VLDL 14.2 07/26/2024 08:48 AM    VLDL 12 11/17/2020 09:45 AM     No results found for: \"VITD3\"    Lab Results   Component Value Date/Time    TSH 1.24 06/06/2022 09:11 AM

## 2024-11-18 ENCOUNTER — HOSPITAL ENCOUNTER (OUTPATIENT)
Facility: HOSPITAL | Age: 79
Discharge: HOME | End: 2024-11-21
Attending: ORTHOPAEDIC SURGERY
Payer: MEDICARE

## 2024-11-18 DIAGNOSIS — M19.011 PRIMARY OSTEOARTHRITIS, RIGHT SHOULDER: ICD-10-CM

## 2024-11-18 PROCEDURE — 73200 CT UPPER EXTREMITY W/O DYE: CPT

## 2024-12-03 ENCOUNTER — OFFICE VISIT (OUTPATIENT)
Age: 79
End: 2024-12-03
Payer: MEDICARE

## 2024-12-03 ENCOUNTER — TELEPHONE (OUTPATIENT)
Age: 79
End: 2024-12-03

## 2024-12-03 VITALS
BODY MASS INDEX: 25.37 KG/M2 | WEIGHT: 148.6 LBS | TEMPERATURE: 97.5 F | HEIGHT: 64 IN | SYSTOLIC BLOOD PRESSURE: 132 MMHG | HEART RATE: 92 BPM | OXYGEN SATURATION: 98 % | DIASTOLIC BLOOD PRESSURE: 78 MMHG | RESPIRATION RATE: 18 BRPM

## 2024-12-03 DIAGNOSIS — I10 PRIMARY HYPERTENSION: ICD-10-CM

## 2024-12-03 DIAGNOSIS — Z01.818 PRE-OP EVALUATION: Primary | ICD-10-CM

## 2024-12-03 PROCEDURE — 3075F SYST BP GE 130 - 139MM HG: CPT | Performed by: FAMILY MEDICINE

## 2024-12-03 PROCEDURE — 1125F AMNT PAIN NOTED PAIN PRSNT: CPT | Performed by: FAMILY MEDICINE

## 2024-12-03 PROCEDURE — 1159F MED LIST DOCD IN RCRD: CPT | Performed by: FAMILY MEDICINE

## 2024-12-03 PROCEDURE — 99213 OFFICE O/P EST LOW 20 MIN: CPT | Performed by: FAMILY MEDICINE

## 2024-12-03 PROCEDURE — 1036F TOBACCO NON-USER: CPT | Performed by: FAMILY MEDICINE

## 2024-12-03 PROCEDURE — G8427 DOCREV CUR MEDS BY ELIG CLIN: HCPCS | Performed by: FAMILY MEDICINE

## 2024-12-03 PROCEDURE — 93005 ELECTROCARDIOGRAM TRACING: CPT | Performed by: FAMILY MEDICINE

## 2024-12-03 PROCEDURE — 93010 ELECTROCARDIOGRAM REPORT: CPT | Performed by: FAMILY MEDICINE

## 2024-12-03 PROCEDURE — G8399 PT W/DXA RESULTS DOCUMENT: HCPCS | Performed by: FAMILY MEDICINE

## 2024-12-03 PROCEDURE — 1090F PRES/ABSN URINE INCON ASSESS: CPT | Performed by: FAMILY MEDICINE

## 2024-12-03 PROCEDURE — G8484 FLU IMMUNIZE NO ADMIN: HCPCS | Performed by: FAMILY MEDICINE

## 2024-12-03 PROCEDURE — 3078F DIAST BP <80 MM HG: CPT | Performed by: FAMILY MEDICINE

## 2024-12-03 PROCEDURE — 1123F ACP DISCUSS/DSCN MKR DOCD: CPT | Performed by: FAMILY MEDICINE

## 2024-12-03 PROCEDURE — G8419 CALC BMI OUT NRM PARAM NOF/U: HCPCS | Performed by: FAMILY MEDICINE

## 2024-12-03 ASSESSMENT — ENCOUNTER SYMPTOMS
COUGH: 0
CONSTIPATION: 0
WHEEZING: 0
SHORTNESS OF BREATH: 0
ABDOMINAL PAIN: 0
DIARRHEA: 0
VOMITING: 0
CHEST TIGHTNESS: 0
NAUSEA: 0

## 2024-12-03 ASSESSMENT — PATIENT HEALTH QUESTIONNAIRE - PHQ9
SUM OF ALL RESPONSES TO PHQ QUESTIONS 1-9: 0
SUM OF ALL RESPONSES TO PHQ QUESTIONS 1-9: 0
2. FEELING DOWN, DEPRESSED OR HOPELESS: NOT AT ALL
SUM OF ALL RESPONSES TO PHQ QUESTIONS 1-9: 0
SUM OF ALL RESPONSES TO PHQ QUESTIONS 1-9: 0
1. LITTLE INTEREST OR PLEASURE IN DOING THINGS: NOT AT ALL
SUM OF ALL RESPONSES TO PHQ9 QUESTIONS 1 & 2: 0

## 2024-12-03 NOTE — PROGRESS NOTES
Chief Complaint   Patient presents with    Pre-op Exam     Right shoulder replacement      \"Have you been to the ER, urgent care clinic since your last visit?  Hospitalized since your last visit?\"    NO    “Have you seen or consulted any other health care providers outside of [facility] since your last visit?”    NO       Financial Resource Strain: Low Risk  (7/27/2024)    Overall Financial Resource Strain (CARDIA)     Difficulty of Paying Living Expenses: Not hard at all      Food Insecurity: No Food Insecurity (7/27/2024)    Hunger Vital Sign     Worried About Running Out of Food in the Last Year: Never true     Ran Out of Food in the Last Year: Never true            12/3/2024     1:16 PM   PHQ-9    Little interest or pleasure in doing things 0   Feeling down, depressed, or hopeless 0   PHQ-2 Score 0   PHQ-9 Total Score 0       Health Maintenance Due   Topic Date Due    Pneumococcal 65+ years Vaccine (2 of 2 - PCV) 09/18/2015    DTaP/Tdap/Td vaccine (2 - Td or Tdap) 04/01/2024    Flu vaccine (1) 08/01/2024    COVID-19 Vaccine (3 - 2023-24 season) 09/01/2024             
Status: She is alert and oriented to person, place, and time. Mental status is at baseline.         Treatment risks/benefits/costs/interactions/alternatives discussed with patient.  Advised patient to call back or return to office if symptoms worsen/change/persist. If patient cannot reach us or should anything more severe/urgent arise he/she should proceed directly to the nearest emergency department.  Discussed expected course/resolution/complications of diagnosis in detail with patient.  Patient expressed understanding with the diagnosis and plan.     This dictation may have been completed with Dragon, the computer voice recognition software.  Unanticipated grammatical, syntax, homophones, and other interpretive errors are sometimes inadvertently transcribed by the computer software.  Please disregard any errors that have escaped final proofreading.      Yonathan Adam M.D.

## 2024-12-03 NOTE — TELEPHONE ENCOUNTER
Pt called states the she need to get a EKG today or tomorrows pt states she is having surgery on Friday.    [facility] [phone]

## 2024-12-03 NOTE — TELEPHONE ENCOUNTER
Called Patient. Name and  confirmed.  Scheduled appt on 12/3/24 at 1:15 PM. Verbalized understanding.

## 2025-02-05 SDOH — ECONOMIC STABILITY: INCOME INSECURITY: IN THE LAST 12 MONTHS, WAS THERE A TIME WHEN YOU WERE NOT ABLE TO PAY THE MORTGAGE OR RENT ON TIME?: NO

## 2025-02-05 SDOH — ECONOMIC STABILITY: FOOD INSECURITY: WITHIN THE PAST 12 MONTHS, YOU WORRIED THAT YOUR FOOD WOULD RUN OUT BEFORE YOU GOT MONEY TO BUY MORE.: NEVER TRUE

## 2025-02-05 SDOH — ECONOMIC STABILITY: FOOD INSECURITY: WITHIN THE PAST 12 MONTHS, THE FOOD YOU BOUGHT JUST DIDN'T LAST AND YOU DIDN'T HAVE MONEY TO GET MORE.: NEVER TRUE

## 2025-02-05 SDOH — ECONOMIC STABILITY: TRANSPORTATION INSECURITY
IN THE PAST 12 MONTHS, HAS THE LACK OF TRANSPORTATION KEPT YOU FROM MEDICAL APPOINTMENTS OR FROM GETTING MEDICATIONS?: NO

## 2025-02-06 ENCOUNTER — OFFICE VISIT (OUTPATIENT)
Age: 80
End: 2025-02-06
Payer: MEDICARE

## 2025-02-06 VITALS
HEIGHT: 64 IN | SYSTOLIC BLOOD PRESSURE: 132 MMHG | BODY MASS INDEX: 24.96 KG/M2 | OXYGEN SATURATION: 97 % | HEART RATE: 72 BPM | DIASTOLIC BLOOD PRESSURE: 78 MMHG | WEIGHT: 146.2 LBS | RESPIRATION RATE: 18 BRPM | TEMPERATURE: 97.3 F

## 2025-02-06 DIAGNOSIS — E11.9 DIET-CONTROLLED DIABETES MELLITUS (HCC): Primary | ICD-10-CM

## 2025-02-06 DIAGNOSIS — I10 PRIMARY HYPERTENSION: ICD-10-CM

## 2025-02-06 DIAGNOSIS — Z23 ENCOUNTER FOR IMMUNIZATION: ICD-10-CM

## 2025-02-06 LAB — HBA1C MFR BLD: 5.8 %

## 2025-02-06 PROCEDURE — 1036F TOBACCO NON-USER: CPT | Performed by: FAMILY MEDICINE

## 2025-02-06 PROCEDURE — G8419 CALC BMI OUT NRM PARAM NOF/U: HCPCS | Performed by: FAMILY MEDICINE

## 2025-02-06 PROCEDURE — 1123F ACP DISCUSS/DSCN MKR DOCD: CPT | Performed by: FAMILY MEDICINE

## 2025-02-06 PROCEDURE — PBSHW AMB POC HEMOGLOBIN A1C: Performed by: FAMILY MEDICINE

## 2025-02-06 PROCEDURE — G8427 DOCREV CUR MEDS BY ELIG CLIN: HCPCS | Performed by: FAMILY MEDICINE

## 2025-02-06 PROCEDURE — PBSHW PNEUMOCOCCAL, PCV20, PREVNAR 20, (AGE 6W+), IM, PF: Performed by: FAMILY MEDICINE

## 2025-02-06 PROCEDURE — 90677 PCV20 VACCINE IM: CPT | Performed by: FAMILY MEDICINE

## 2025-02-06 PROCEDURE — 99213 OFFICE O/P EST LOW 20 MIN: CPT | Performed by: FAMILY MEDICINE

## 2025-02-06 PROCEDURE — 1126F AMNT PAIN NOTED NONE PRSNT: CPT | Performed by: FAMILY MEDICINE

## 2025-02-06 PROCEDURE — 1159F MED LIST DOCD IN RCRD: CPT | Performed by: FAMILY MEDICINE

## 2025-02-06 PROCEDURE — 3078F DIAST BP <80 MM HG: CPT | Performed by: FAMILY MEDICINE

## 2025-02-06 PROCEDURE — G8399 PT W/DXA RESULTS DOCUMENT: HCPCS | Performed by: FAMILY MEDICINE

## 2025-02-06 PROCEDURE — 83036 HEMOGLOBIN GLYCOSYLATED A1C: CPT | Performed by: FAMILY MEDICINE

## 2025-02-06 PROCEDURE — PBSHW PBB SHADOW CHARGE: Performed by: FAMILY MEDICINE

## 2025-02-06 PROCEDURE — 3075F SYST BP GE 130 - 139MM HG: CPT | Performed by: FAMILY MEDICINE

## 2025-02-06 PROCEDURE — G2211 COMPLEX E/M VISIT ADD ON: HCPCS | Performed by: FAMILY MEDICINE

## 2025-02-06 PROCEDURE — 1090F PRES/ABSN URINE INCON ASSESS: CPT | Performed by: FAMILY MEDICINE

## 2025-02-06 ASSESSMENT — PATIENT HEALTH QUESTIONNAIRE - PHQ9
2. FEELING DOWN, DEPRESSED OR HOPELESS: NOT AT ALL
SUM OF ALL RESPONSES TO PHQ9 QUESTIONS 1 & 2: 0
1. LITTLE INTEREST OR PLEASURE IN DOING THINGS: NOT AT ALL
SUM OF ALL RESPONSES TO PHQ QUESTIONS 1-9: 0

## 2025-02-06 NOTE — PROGRESS NOTES
Patient Name: Natalie Vick   MRN: [id number]    ASSESSMENT AND PLAN  Natalie Vick is a 79 y.o. female who presents today for:    1. Diet-controlled diabetes mellitus (HCC)  Stable, continue current treatment.  - AMB POC HEMOGLOBIN A1C    2. Primary hypertension  Stable, continue current treatment.    3. Encounter for immunization  - Pneumococcal, PCV20, PREVNAR 20, (age 6w+), IM, PF    No orders of the defined types were placed in this encounter.       There are no discontinued medications.    Return in about 6 months (around 8/6/2025) for DM, AWV.      SUBJECTIVE  Natalie Vick is a 79 y.o. female who presents with the following:     Doing well since her shoulder replacement surgery.    BP Readings from Last 3 Encounters:   02/06/25 132/78   12/03/24 132/78   07/30/24 122/74     Wt Readings from Last 3 Encounters:   02/06/25 66.3 kg (146 lb 3.2 oz)   01/13/25 67.1 kg (148 lb)   12/16/24 67.1 kg (148 lb)     Hemoglobin A1C   Date Value Ref Range Status   07/26/2024 5.9 (H) 4.0 - 5.6 % Final     Comment:     (NOTE)  HbA1C Interpretive Ranges  <5.7              Normal  5.7 - 6.4         Consider Prediabetes  >6.5              Consider Diabetes           All other ROS were reviewed and are negative except as discussed in HPI.  The patient's medications, allergies, past medical history, surgical history, family history and social history were reviewed and updated where appropriate.    Current Outpatient Medications   Medication Sig Dispense Refill    atorvastatin (LIPITOR) 40 MG tablet TAKE 1 TABLET BY MOUTH DAILY 90 tablet 3    olmesartan (BENICAR) 40 MG tablet Take 1 tablet by mouth daily 90 tablet 2     No current facility-administered medications for this visit.       Allergies   Allergen Reactions    Penicillins Itching     Lips itching    Letrozole Other (See Comments)     htn       OBJECTIVE    Visit Vitals  /78 (Site: Left Upper Arm, Position: Sitting, Cuff Size: Medium Adult)   Pulse 72   Temp 97.3 °F (36.3

## 2025-02-06 NOTE — PROGRESS NOTES
Chief Complaint   Patient presents with    Follow-up     \"Have you been to the ER, urgent care clinic since your last visit?  Hospitalized since your last visit?\"    NO    “Have you seen or consulted any other health care providers outside of [facility] since your last visit?”    NO       Financial Resource Strain: Low Risk  (7/27/2024)    Overall Financial Resource Strain (CARDIA)     Difficulty of Paying Living Expenses: Not hard at all      Food Insecurity: No Food Insecurity (2/5/2025)    Hunger Vital Sign     Worried About Running Out of Food in the Last Year: Never true     Ran Out of Food in the Last Year: Never true            2/6/2025     9:11 AM   PHQ-9    Little interest or pleasure in doing things 0   Feeling down, depressed, or hopeless 0   PHQ-2 Score 0   PHQ-9 Total Score 0       Health Maintenance Due   Topic Date Due    Pneumococcal 50+ years Vaccine (2 of 2 - PCV) 09/18/2015    DTaP/Tdap/Td vaccine (2 - Td or Tdap) 04/01/2024    COVID-19 Vaccine (3 - 2024-25 season) 09/01/2024

## 2025-02-28 RX ORDER — OLMESARTAN MEDOXOMIL 40 MG/1
40 TABLET ORAL DAILY
Qty: 90 TABLET | Refills: 2 | Status: SHIPPED | OUTPATIENT
Start: 2025-02-28

## 2025-02-28 NOTE — TELEPHONE ENCOUNTER
Last appointment: 02/06/2025 MD Adam   Next appointment: Nothing scheduled   Previous refill encounter(s):   05/16/2024 Benicar #90 with 2 refills.     For Pharmacy Admin Tracking Only    Program: Medication Refill  Intervention Detail: New Rx: 1, reason: Patient Preference  Time Spent (min): 5    Requested Prescriptions     Pending Prescriptions Disp Refills    olmesartan (BENICAR) 40 MG tablet 90 tablet 0     Sig: Take 1 tablet by mouth daily

## 2025-06-18 ENCOUNTER — OFFICE VISIT (OUTPATIENT)
Age: 80
End: 2025-06-18
Payer: MEDICARE

## 2025-06-18 VITALS
SYSTOLIC BLOOD PRESSURE: 144 MMHG | RESPIRATION RATE: 16 BRPM | WEIGHT: 152.5 LBS | BODY MASS INDEX: 26.03 KG/M2 | HEART RATE: 87 BPM | DIASTOLIC BLOOD PRESSURE: 88 MMHG | HEIGHT: 64 IN | OXYGEN SATURATION: 98 % | TEMPERATURE: 97.1 F

## 2025-06-18 DIAGNOSIS — R10.10 UPPER ABDOMINAL PAIN: Primary | ICD-10-CM

## 2025-06-18 PROCEDURE — 1126F AMNT PAIN NOTED NONE PRSNT: CPT | Performed by: NURSE PRACTITIONER

## 2025-06-18 PROCEDURE — 1123F ACP DISCUSS/DSCN MKR DOCD: CPT | Performed by: NURSE PRACTITIONER

## 2025-06-18 PROCEDURE — G8427 DOCREV CUR MEDS BY ELIG CLIN: HCPCS | Performed by: NURSE PRACTITIONER

## 2025-06-18 PROCEDURE — G8399 PT W/DXA RESULTS DOCUMENT: HCPCS | Performed by: NURSE PRACTITIONER

## 2025-06-18 PROCEDURE — 99213 OFFICE O/P EST LOW 20 MIN: CPT | Performed by: NURSE PRACTITIONER

## 2025-06-18 PROCEDURE — 93010 ELECTROCARDIOGRAM REPORT: CPT | Performed by: NURSE PRACTITIONER

## 2025-06-18 PROCEDURE — 3077F SYST BP >= 140 MM HG: CPT | Performed by: NURSE PRACTITIONER

## 2025-06-18 PROCEDURE — 1159F MED LIST DOCD IN RCRD: CPT | Performed by: NURSE PRACTITIONER

## 2025-06-18 PROCEDURE — G8419 CALC BMI OUT NRM PARAM NOF/U: HCPCS | Performed by: NURSE PRACTITIONER

## 2025-06-18 PROCEDURE — 3079F DIAST BP 80-89 MM HG: CPT | Performed by: NURSE PRACTITIONER

## 2025-06-18 PROCEDURE — 1090F PRES/ABSN URINE INCON ASSESS: CPT | Performed by: NURSE PRACTITIONER

## 2025-06-18 PROCEDURE — 1036F TOBACCO NON-USER: CPT | Performed by: NURSE PRACTITIONER

## 2025-06-18 PROCEDURE — 93005 ELECTROCARDIOGRAM TRACING: CPT | Performed by: NURSE PRACTITIONER

## 2025-06-18 ASSESSMENT — ENCOUNTER SYMPTOMS
DIARRHEA: 0
VOMITING: 0
COUGH: 0
SHORTNESS OF BREATH: 0
CONSTIPATION: 0
ABDOMINAL PAIN: 1
NAUSEA: 0

## 2025-06-18 NOTE — PROGRESS NOTES
Chief Complaint   Patient presents with    Heartburn     Episode of heart burn - Monday morning - woke up from sleep - printed EKG - has monitored from watch      \"Have you been to the ER, urgent care clinic since your last visit?  Hospitalized since your last visit?\"    NO    “Have you seen or consulted any other health care providers outside of [facility] since your last visit?”    NO                   2/6/2025     9:11 AM   PHQ-9    Little interest or pleasure in doing things 0   Feeling down, depressed, or hopeless 0   PHQ-2 Score 0   PHQ-9 Total Score 0           Financial Resource Strain: Low Risk  (7/27/2024)    Overall Financial Resource Strain (CARDIA)     Difficulty of Paying Living Expenses: Not hard at all      Food Insecurity: No Food Insecurity (2/5/2025)    Hunger Vital Sign     Worried About Running Out of Food in the Last Year: Never true     Ran Out of Food in the Last Year: Never true          Health Maintenance Due   Topic Date Due    DTaP/Tdap/Td vaccine (2 - Td or Tdap) 04/01/2024    COVID-19 Vaccine (3 - 2024-25 season) 09/01/2024

## 2025-06-19 NOTE — PROGRESS NOTES
History of Present Illness  Natalie Vick is a 79 y.o. female who presents for evaluation of upper abdominal pain.    Upper Abdominal Pain  - Experienced a burning upper abdominal pain that woke her up on Monday at 5:14 AM  - Pain lasted 15 minutes and resolved by the time she prepared his morning coffee  - No recurrences since then  - No associated symptoms such as jaw pain, pressure, shortness of breath, dizziness, sweating, palpitations, or gastrointestinal issues  - No consumption of spicy or greasy foods prior to onset  - Did awaken her from sleep.    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 Noted to have a wandering isoelectric line.    Supplemental information: PAST SURGICAL HISTORY:  - L5 nerve damage repair surgery  - Shoulder replacement surgery    FAMILY HISTORY  Grandmother had lifelong atrial fibrillation.      Review of Systems   Constitutional:  Negative for activity change, appetite change, diaphoresis and fatigue.   Respiratory:  Negative for cough and shortness of breath.    Cardiovascular:  Positive for palpitations. Negative for chest pain.   Gastrointestinal:  Positive for abdominal pain. Negative for constipation, diarrhea, nausea and vomiting.   Neurological:  Negative for dizziness and light-headedness.   All other systems reviewed and are negative.          Past Medical History:   Diagnosis Date    Cancer (HCC)     Diabetes mellitus, controlled (HCC) 9/3/2015    Diet-controlled diabetes mellitus (HCC) 09/03/2015    Genital herpes 3/10/2010    GERD (gastroesophageal reflux disease)     History of breast cancer 06/16/2017    HTN, goal below 140/90 3/8/2010    Hyperlipemia 11/26/2014

## 2025-06-30 DIAGNOSIS — E78.5 HYPERLIPIDEMIA, UNSPECIFIED HYPERLIPIDEMIA TYPE: ICD-10-CM

## 2025-07-01 RX ORDER — ATORVASTATIN CALCIUM 40 MG/1
TABLET, FILM COATED ORAL DAILY
Qty: 90 TABLET | Refills: 3 | Status: SHIPPED | OUTPATIENT
Start: 2025-07-01

## 2025-07-01 NOTE — TELEPHONE ENCOUNTER
PCP: Yonathan Adam MD    Last appt: 6/18/2025       Future Appointments   Date Time Provider Department Center   7/1/2025  8:50 AM Rolando Hein III, MD [facility]   12/9/2025  9:00 AM Elaina Arauz MD [facility]       Requested Prescriptions     Pending Prescriptions Disp Refills    atorvastatin (LIPITOR) 40 MG tablet [Pharmacy Med Name: Atorvastatin Calcium 40 MG Oral Tablet] 90 tablet 3     Sig: TAKE 1 TABLET BY MOUTH DAILY       Prior labs and Blood pressures:  BP Readings from Last 3 Encounters:   06/18/25 (!) 144/88   02/06/25 132/78   12/03/24 132/78     Lab Results   Component Value Date/Time     07/26/2024 08:48 AM    K 4.5 07/26/2024 08:48 AM    CL 99 07/26/2024 08:48 AM    CO2 26 07/26/2024 08:48 AM    BUN 14 07/26/2024 08:48 AM    GFRAA >60 06/06/2022 09:11 AM     Lab Results   Component Value Date/Time    WOR7BDTI 5.8 02/06/2025 09:33 AM     Lab Results   Component Value Date/Time    CHOL 177 07/26/2024 08:48 AM    HDL 77 07/26/2024 08:48 AM    LDL 85.8 07/26/2024 08:48 AM    LDL 61.6 07/21/2023 08:45 AM    VLDL 14.2 07/26/2024 08:48 AM    VLDL 12 11/17/2020 09:45 AM     No results found for: \"VITD3\"    Lab Results   Component Value Date/Time    TSH 1.24 06/06/2022 09:11 AM